# Patient Record
Sex: FEMALE | Race: WHITE | Employment: OTHER | URBAN - METROPOLITAN AREA
[De-identification: names, ages, dates, MRNs, and addresses within clinical notes are randomized per-mention and may not be internally consistent; named-entity substitution may affect disease eponyms.]

---

## 2019-01-09 ENCOUNTER — TELEPHONE (OUTPATIENT)
Dept: FAMILY MEDICINE CLINIC | Facility: CLINIC | Age: 79
End: 2019-01-09

## 2019-01-09 ENCOUNTER — OFFICE VISIT (OUTPATIENT)
Dept: FAMILY MEDICINE CLINIC | Facility: CLINIC | Age: 79
End: 2019-01-09
Payer: COMMERCIAL

## 2019-01-09 VITALS
DIASTOLIC BLOOD PRESSURE: 70 MMHG | SYSTOLIC BLOOD PRESSURE: 130 MMHG | HEIGHT: 60 IN | WEIGHT: 106 LBS | BODY MASS INDEX: 20.81 KG/M2

## 2019-01-09 DIAGNOSIS — E78.00 HYPERCHOLESTEREMIA: ICD-10-CM

## 2019-01-09 DIAGNOSIS — D22.9 CHANGING NEVUS: ICD-10-CM

## 2019-01-09 DIAGNOSIS — Z85.3 PERSONAL HISTORY OF BREAST CANCER: ICD-10-CM

## 2019-01-09 DIAGNOSIS — Z00.00 ENCOUNTER FOR MEDICARE ANNUAL WELLNESS EXAM: Primary | ICD-10-CM

## 2019-01-09 DIAGNOSIS — E11.9 CONTROLLED TYPE 2 DIABETES MELLITUS WITHOUT COMPLICATION, WITHOUT LONG-TERM CURRENT USE OF INSULIN (HCC): ICD-10-CM

## 2019-01-09 DIAGNOSIS — Z13.31 DEPRESSION SCREENING: ICD-10-CM

## 2019-01-09 DIAGNOSIS — Z90.11 STATUS POST RIGHT MASTECTOMY: ICD-10-CM

## 2019-01-09 DIAGNOSIS — I10 ESSENTIAL HYPERTENSION: ICD-10-CM

## 2019-01-09 PROCEDURE — 96160 PT-FOCUSED HLTH RISK ASSMT: CPT

## 2019-01-09 PROCEDURE — 99397 PER PM REEVAL EST PAT 65+ YR: CPT

## 2019-01-09 PROCEDURE — G0439 PPPS, SUBSEQ VISIT: HCPCS

## 2019-01-09 RX ORDER — ATORVASTATIN CALCIUM 10 MG/1
10 TABLET, FILM COATED ORAL NIGHTLY
COMMUNITY
End: 2019-01-11

## 2019-01-09 RX ORDER — ENALAPRIL MALEATE 5 MG/1
5 TABLET ORAL DAILY
COMMUNITY
End: 2019-01-09

## 2019-01-09 RX ORDER — ENALAPRIL MALEATE 5 MG/1
5 TABLET ORAL DAILY
Qty: 90 TABLET | Refills: 0 | Status: SHIPPED | OUTPATIENT
Start: 2019-01-09 | End: 2019-03-01

## 2019-01-10 ENCOUNTER — NURSE ONLY (OUTPATIENT)
Dept: FAMILY MEDICINE CLINIC | Facility: CLINIC | Age: 79
End: 2019-01-10
Payer: COMMERCIAL

## 2019-01-10 PROCEDURE — 36415 COLL VENOUS BLD VENIPUNCTURE: CPT

## 2019-01-10 NOTE — PATIENT INSTRUCTIONS
Ric Ghotra's SCREENING SCHEDULE   Tests on this list are recommended by your physician but may not be covered, or covered at this frequency, by your insurer. Please check with your insurance carrier before scheduling to verify coverage.    Tami Sahu Screen  Covered every 5 years No results found for this or any previous visit. No flowsheet data found. Fecal Occult Blood   Covered Annually No results found for: FOB, OCCULTSTOOL No flowsheet data found.      Barium Enema-   uncomfortable but covered birthday    Hepatitis B for Moderate/High Risk       No orders found for this or any previous visit.  Medium/high risk factors:   End-stage renal disease   Hemophiliacs who received Factor VIII or IX concentrates   Clients of institutions for the mentally r

## 2019-01-10 NOTE — PROGRESS NOTES
HPI:   Saqib Sanchez is a 67year old female who presents for a MA (Medicare Advantage) [facility] (Once per calendar year). Pt c/o raised dark colored mole on her R lower leg that has been growing in size horizontally over the past year.  She denies had a score of 0 so is at low risk.     Patient Care Team: Patient Care Team:  Alvarez No MD as PCP - General (Family Medicine)    Patient Active Problem List:     Encounter for Medicare annual wellness exam     Essential hypertension     Hypercholest dysuria, vaginal discharge or itching, no complaint of urinary incontinence   MUSCULOSKELETAL: denies back pain  NEURO: denies headaches  PSYCHE: denies depression or anxiety  HEMATOLOGIC: denies hx of anemia  ENDOCRINE: denies thyroid history  ALL/ASTHMA: turgor normal, no rashes, large raised round dark colored lesion on mid lateral aspect of R LE   Lymph nodes: Cervical, supraclavicular, and axillary nodes normal   Neurologic: Normal       Vaccination History     Immunization History   Administered Date(s good     PLAN:  The patient indicates understanding of these issues and agrees to the plan. Reinforced healthy diet, lifestyle, and exercise.     Return in about 3 months (around 4/9/2019) for f/u and further treatment of HTN and DM and in 1 year for Medic 3 yrs age 21-68 or Pap+HPV every 5 yrs age 33-67, age 72 and older at high risk There are no preventive care reminders to display for this patient.  Update Health Maintenance if applicable    Chlamydia  Annually if high risk No results found for: CHLAMYDIA found.       Diabetes      HgbA1C  Annually No results found for: A1C    No flowsheet data found. Creat/alb ratio  Annually No results found for: MICROALBCREA, MALBCRECALC     LDL  Annually No results found for: LDL, LDLC No flowsheet data found.      Brinda Early

## 2019-01-11 DIAGNOSIS — E78.00 HYPERCHOLESTEREMIA: ICD-10-CM

## 2019-01-11 LAB
ALBUMIN/GLOBULIN RATIO: 1.7 (CALC) (ref 1–2.5)
ALBUMIN: 4.5 G/DL (ref 3.6–5.1)
ALKALINE PHOSPHATASE: 89 U/L (ref 33–130)
ALT: 17 U/L (ref 6–29)
APPEARANCE: CLEAR
AST: 22 U/L (ref 10–35)
BILIRUBIN, TOTAL: 0.6 MG/DL (ref 0.2–1.2)
BILIRUBIN: NEGATIVE
BUN/CREATININE RATIO: 17 (CALC) (ref 6–22)
BUN: 10 MG/DL (ref 7–25)
CALCIUM: 10 MG/DL (ref 8.6–10.4)
CARBON DIOXIDE: 26 MMOL/L (ref 20–32)
CHLORIDE: 101 MMOL/L (ref 98–110)
CHOL/HDLC RATIO: 2.9 (CALC)
CHOLESTEROL, TOTAL: 141 MG/DL
COLOR: YELLOW
CREATININE, RANDOM URINE: 29 MG/DL (ref 20–275)
CREATININE: 0.58 MG/DL (ref 0.6–0.93)
EGFR IF AFRICN AM: 102 ML/MIN/1.73M2
EGFR IF NONAFRICN AM: 88 ML/MIN/1.73M2
GLOBULIN: 2.6 G/DL (CALC) (ref 1.9–3.7)
GLUCOSE: 126 MG/DL (ref 65–99)
GLUCOSE: NEGATIVE
HDL CHOLESTEROL: 49 MG/DL
HEMATOCRIT: 42.5 % (ref 35–45)
HEMOGLOBIN A1C: 6.6 % OF TOTAL HGB
HEMOGLOBIN: 14.1 G/DL (ref 11.7–15.5)
KETONES: NEGATIVE
LDL-CHOLESTEROL: 75 MG/DL (CALC)
MCH: 29.3 PG (ref 27–33)
MCHC: 33.2 G/DL (ref 32–36)
MCV: 88.4 FL (ref 80–100)
MICROALBUMIN/CREATININE RATIO, RANDOM URINE: 7 MCG/MG CREAT
MICROALBUMIN: 0.2 MG/DL
MPV: 13.2 FL (ref 7.5–12.5)
NITRITE: NEGATIVE
NON-HDL CHOLESTEROL: 92 MG/DL (CALC)
OCCULT BLOOD: NEGATIVE
PH: 7.5 (ref 5–8)
PLATELET COUNT: 207 THOUSAND/UL (ref 140–400)
POTASSIUM: 4.9 MMOL/L (ref 3.5–5.3)
PROTEIN, TOTAL: 7.1 G/DL (ref 6.1–8.1)
PROTEIN: NEGATIVE
RDW: 12.2 % (ref 11–15)
RED BLOOD CELL COUNT: 4.81 MILLION/UL (ref 3.8–5.1)
SODIUM: 136 MMOL/L (ref 135–146)
SPECIFIC GRAVITY: 1.01 (ref 1–1.03)
TRIGLYCERIDES: 87 MG/DL
WHITE BLOOD CELL COUNT: 6.8 THOUSAND/UL (ref 3.8–10.8)

## 2019-01-11 RX ORDER — ATORVASTATIN CALCIUM 10 MG/1
10 TABLET, FILM COATED ORAL NIGHTLY
Qty: 90 TABLET | Refills: 3 | Status: SHIPPED | OUTPATIENT
Start: 2019-01-11 | End: 2019-01-11

## 2019-01-25 PROBLEM — Z00.00 ENCOUNTER FOR MEDICARE ANNUAL WELLNESS EXAM: Status: RESOLVED | Noted: 2019-01-09 | Resolved: 2019-01-25

## 2019-01-25 PROBLEM — H35.372 EPIRETINAL MEMBRANE (ERM) OF LEFT EYE: Status: ACTIVE | Noted: 2019-01-25

## 2019-01-25 PROBLEM — E11.3293 CONTROLLED TYPE 2 DIABETES MELLITUS WITH BOTH EYES AFFECTED BY MILD NONPROLIFERATIVE RETINOPATHY WITHOUT MACULAR EDEMA, WITHOUT LONG-TERM CURRENT USE OF INSULIN (HCC): Status: ACTIVE | Noted: 2019-01-09

## 2019-01-25 PROBLEM — Z96.1 PSEUDOPHAKIA OF BOTH EYES: Status: ACTIVE | Noted: 2019-01-25

## 2019-01-25 PROBLEM — H40.003 GLAUCOMA SUSPECT OF BOTH EYES: Status: ACTIVE | Noted: 2019-01-25

## 2019-01-25 PROBLEM — Z13.31 DEPRESSION SCREENING: Status: RESOLVED | Noted: 2019-01-09 | Resolved: 2019-01-25

## 2019-02-21 ENCOUNTER — TELEPHONE (OUTPATIENT)
Dept: FAMILY MEDICINE CLINIC | Facility: CLINIC | Age: 79
End: 2019-02-21

## 2019-03-01 DIAGNOSIS — I10 ESSENTIAL HYPERTENSION: ICD-10-CM

## 2019-03-01 RX ORDER — ENALAPRIL MALEATE 5 MG/1
TABLET ORAL
Qty: 90 TABLET | Refills: 0 | Status: SHIPPED | OUTPATIENT
Start: 2019-03-01 | End: 2019-03-01

## 2019-05-18 ENCOUNTER — NURSE ONLY (OUTPATIENT)
Dept: FAMILY MEDICINE CLINIC | Facility: CLINIC | Age: 79
End: 2019-05-18
Payer: COMMERCIAL

## 2019-05-18 DIAGNOSIS — E11.9 CONTROLLED TYPE 2 DIABETES MELLITUS WITHOUT COMPLICATION, WITHOUT LONG-TERM CURRENT USE OF INSULIN (HCC): Primary | ICD-10-CM

## 2019-05-18 PROCEDURE — 36415 COLL VENOUS BLD VENIPUNCTURE: CPT

## 2019-05-18 NOTE — PROGRESS NOTES
Patient came in today to get labs done, Left arm was used w/o complications and specimen sent to [facility].

## 2019-05-22 ENCOUNTER — TELEPHONE (OUTPATIENT)
Dept: FAMILY MEDICINE CLINIC | Facility: CLINIC | Age: 79
End: 2019-05-22

## 2019-05-22 ENCOUNTER — OFFICE VISIT (OUTPATIENT)
Dept: FAMILY MEDICINE CLINIC | Facility: CLINIC | Age: 79
End: 2019-05-22
Payer: COMMERCIAL

## 2019-05-22 VITALS
DIASTOLIC BLOOD PRESSURE: 60 MMHG | HEART RATE: 88 BPM | BODY MASS INDEX: 21 KG/M2 | WEIGHT: 108.63 LBS | SYSTOLIC BLOOD PRESSURE: 134 MMHG | OXYGEN SATURATION: 97 %

## 2019-05-22 DIAGNOSIS — Z90.11 STATUS POST RIGHT MASTECTOMY: ICD-10-CM

## 2019-05-22 DIAGNOSIS — I10 ESSENTIAL HYPERTENSION: ICD-10-CM

## 2019-05-22 DIAGNOSIS — E78.00 HYPERCHOLESTEREMIA: ICD-10-CM

## 2019-05-22 DIAGNOSIS — Z85.3 PERSONAL HISTORY OF BREAST CANCER: ICD-10-CM

## 2019-05-22 DIAGNOSIS — E11.3293 CONTROLLED TYPE 2 DIABETES MELLITUS WITH BOTH EYES AFFECTED BY MILD NONPROLIFERATIVE RETINOPATHY WITHOUT MACULAR EDEMA, WITHOUT LONG-TERM CURRENT USE OF INSULIN (HCC): Primary | ICD-10-CM

## 2019-05-22 PROCEDURE — 99213 OFFICE O/P EST LOW 20 MIN: CPT

## 2019-05-22 RX ORDER — ENALAPRIL MALEATE 5 MG/1
2.5 TABLET ORAL DAILY
Qty: 90 TABLET | Refills: 0 | COMMUNITY
Start: 2019-03-01 | End: 2019-07-09

## 2019-05-22 RX ORDER — ATORVASTATIN CALCIUM 10 MG/1
5 TABLET, FILM COATED ORAL NIGHTLY
Qty: 90 TABLET | Refills: 3 | COMMUNITY
Start: 2019-01-11 | End: 2020-02-24

## 2019-05-23 NOTE — PROGRESS NOTES
HPI:    Patient ID: Munir Gentleman is a 67year old female. Diabetes   She presents for her follow-up diabetic visit. She has type 2 diabetes mellitus. Onset time: few years ago. Her disease course has been stable.  There are no hypoglycemic associate Disp: 90 tablet Rfl: 0   atorvastatin 10 MG Oral Tab Take 0.5 tablets (5 mg total) by mouth nightly. Disp: 90 tablet Rfl: 3     Allergies:No Known Allergies   PHYSICAL EXAM:   Physical Exam   Constitutional: She is oriented to person, place, and time.  She

## 2019-05-23 NOTE — PATIENT INSTRUCTIONS
Diabetes (Información general)  La diabetes es un problema de mark a moses plazo que significa que zarate cuerpo no produce la suficiente insulina. O también puede significar que zarate cuerpo no puede utilizar la [employer] produce.  La insulina es césar hormon · Siga la dieta que le recomiende zarate proveedor de atención médica.  Use la insulina o cualquier otro medicamento para la diabetes, exactamente geovany se lo indiquen. · Vigile ashlyn niveles de azúcar en la [employer] indiquen.  Lleve un registro con los res · Siga tomando ashlyn pastillas para la diabetes (medicamentos orales) incluso si ha estado vomitando y se siente muy enfermo.  Llame a zarate proveedor de inmediato porque es posible que necesite insulina para bajar los niveles de azúcar en la sera hasta que se Revise zarate nivel de azúcar en la sera 15 minutos después de tratarse. Si sigue por debajo de 70, tome de 15 a 20 gramos más de azúcar de acción rápida. Vuelva a revisarse en 15 minutos.  Si regresa a lo normal (79 o más), coma un bocadillo o césar comida par

## 2019-05-30 DIAGNOSIS — I10 ESSENTIAL HYPERTENSION: ICD-10-CM

## 2019-05-31 RX ORDER — ENALAPRIL MALEATE 5 MG/1
TABLET ORAL
Qty: 90 TABLET | Refills: 0 | OUTPATIENT
Start: 2019-05-31

## 2019-06-03 ENCOUNTER — TELEPHONE (OUTPATIENT)
Dept: FAMILY MEDICINE CLINIC | Facility: CLINIC | Age: 79
End: 2019-06-03

## 2019-06-10 PROBLEM — K58.8 OTHER IRRITABLE BOWEL SYNDROME: Status: ACTIVE | Noted: 2017-11-28

## 2019-06-10 PROBLEM — Q43.8 TORTUOUS COLON: Status: ACTIVE | Noted: 2017-11-28

## 2019-06-10 PROBLEM — K64.8 INTERNAL HEMORRHOIDS: Status: ACTIVE | Noted: 2017-11-28

## 2019-06-10 PROBLEM — K57.30 DIVERTICULOSIS OF COLON: Status: ACTIVE | Noted: 2017-11-28

## 2019-07-09 DIAGNOSIS — I10 ESSENTIAL HYPERTENSION: ICD-10-CM

## 2019-07-11 RX ORDER — ENALAPRIL MALEATE 5 MG/1
TABLET ORAL
Qty: 90 TABLET | Refills: 0 | Status: SHIPPED | OUTPATIENT
Start: 2019-07-11 | End: 2019-12-06

## 2019-08-16 ENCOUNTER — NURSE ONLY (OUTPATIENT)
Dept: FAMILY MEDICINE CLINIC | Facility: CLINIC | Age: 79
End: 2019-08-16
Payer: COMMERCIAL

## 2019-08-16 DIAGNOSIS — I10 ESSENTIAL HYPERTENSION: ICD-10-CM

## 2019-08-16 DIAGNOSIS — E11.3293 CONTROLLED TYPE 2 DIABETES MELLITUS WITH BOTH EYES AFFECTED BY MILD NONPROLIFERATIVE RETINOPATHY WITHOUT MACULAR EDEMA, WITHOUT LONG-TERM CURRENT USE OF INSULIN (HCC): ICD-10-CM

## 2019-08-16 PROCEDURE — 36415 COLL VENOUS BLD VENIPUNCTURE: CPT

## 2019-08-16 NOTE — PROGRESS NOTES
Patient came in today to get labs done, R arm was used w/o complications and specimen sent to [facility].

## 2019-08-17 LAB
ALBUMIN/GLOBULIN RATIO: 1.6 (CALC) (ref 1–2.5)
ALBUMIN: 4.5 G/DL (ref 3.6–5.1)
ALKALINE PHOSPHATASE: 101 U/L (ref 33–130)
ALT: 15 U/L (ref 6–29)
AST: 21 U/L (ref 10–35)
BILIRUBIN, TOTAL: 0.6 MG/DL (ref 0.2–1.2)
BUN/CREATININE RATIO: 15 (CALC) (ref 6–22)
BUN: 9 MG/DL (ref 7–25)
CALCIUM: 10.1 MG/DL (ref 8.6–10.4)
CARBON DIOXIDE: 24 MMOL/L (ref 20–32)
CHLORIDE: 102 MMOL/L (ref 98–110)
CREATININE: 0.59 MG/DL (ref 0.6–0.93)
EGFR IF AFRICN AM: 102 ML/MIN/1.73M2
EGFR IF NONAFRICN AM: 88 ML/MIN/1.73M2
GLOBULIN: 2.8 G/DL (CALC) (ref 1.9–3.7)
GLUCOSE: 122 MG/DL (ref 65–99)
HEMOGLOBIN A1C: 6.6 % OF TOTAL HGB
POTASSIUM: 5 MMOL/L (ref 3.5–5.3)
PROTEIN, TOTAL: 7.3 G/DL (ref 6.1–8.1)
SODIUM: 136 MMOL/L (ref 135–146)

## 2019-08-21 ENCOUNTER — OFFICE VISIT (OUTPATIENT)
Dept: FAMILY MEDICINE CLINIC | Facility: CLINIC | Age: 79
End: 2019-08-21
Payer: COMMERCIAL

## 2019-08-21 VITALS
OXYGEN SATURATION: 97 % | HEART RATE: 94 BPM | HEIGHT: 57 IN | BODY MASS INDEX: 22.65 KG/M2 | SYSTOLIC BLOOD PRESSURE: 130 MMHG | WEIGHT: 105 LBS | DIASTOLIC BLOOD PRESSURE: 60 MMHG

## 2019-08-21 DIAGNOSIS — I10 ESSENTIAL HYPERTENSION: ICD-10-CM

## 2019-08-21 DIAGNOSIS — Z01.89 ENCOUNTER FOR ROUTINE LABORATORY TESTING: ICD-10-CM

## 2019-08-21 DIAGNOSIS — E11.3293 CONTROLLED TYPE 2 DIABETES MELLITUS WITH BOTH EYES AFFECTED BY MILD NONPROLIFERATIVE RETINOPATHY WITHOUT MACULAR EDEMA, WITHOUT LONG-TERM CURRENT USE OF INSULIN (HCC): Primary | ICD-10-CM

## 2019-08-21 DIAGNOSIS — E78.00 HYPERCHOLESTEREMIA: ICD-10-CM

## 2019-08-21 PROCEDURE — 99214 OFFICE O/P EST MOD 30 MIN: CPT

## 2019-08-21 RX ORDER — ENALAPRIL MALEATE 5 MG/1
5 TABLET ORAL
Qty: 90 TABLET | Refills: 0 | Status: CANCELLED | OUTPATIENT
Start: 2019-08-21

## 2019-08-23 NOTE — PROGRESS NOTES
HPI:    Patient ID: Fani Wadr is a 67year old female. Diabetes   She presents for her follow-up diabetic visit. She has type 2 diabetes mellitus. Onset time: few years ago. Her disease course has been improving.  There are no hypoglycemic associ Negative for photophobia, discharge and itching. Respiratory: Negative for cough and shortness of breath. Cardiovascular: Negative for chest pain, palpitations, orthopnea and PND.    Gastrointestinal: Negative for abdominal pain, constipation, diarrhea advised to decrease amount of salt intake in her diet. BP currently controlled. Continue Enalapril as previously instructed. Will continue BP monitoring on next scheduled appointment. 3. Hypercholesteremia  - COMP METABOLIC PANEL (14);  Future  - LIPI

## 2019-08-29 DIAGNOSIS — I10 ESSENTIAL HYPERTENSION: ICD-10-CM

## 2019-09-04 RX ORDER — ENALAPRIL MALEATE 5 MG/1
TABLET ORAL
Qty: 90 TABLET | Refills: 0 | OUTPATIENT
Start: 2019-09-04

## 2019-11-04 ENCOUNTER — OFFICE VISIT (OUTPATIENT)
Dept: DERMATOLOGY CLINIC | Facility: CLINIC | Age: 79
End: 2019-11-04
Payer: COMMERCIAL

## 2019-11-04 DIAGNOSIS — L81.4 SOLAR LENTIGO: ICD-10-CM

## 2019-11-04 DIAGNOSIS — D48.5 NEOPLASM OF UNCERTAIN BEHAVIOR OF SKIN: Primary | ICD-10-CM

## 2019-11-04 PROCEDURE — 11102 TANGNTL BX SKIN SINGLE LES: CPT | Performed by: DERMATOLOGY

## 2019-11-04 PROCEDURE — 99201 OFFICE/OUTPT VISIT,NEW,LEVL I: CPT | Performed by: DERMATOLOGY

## 2019-11-04 NOTE — PROGRESS NOTES
HPI:     Chief Complaint     Moles        HPI     Moles      Additional comments: New Patient ppresent with large raised mole .  Patient c/o having mole for 4 years and has changed size and color          Last edited by Flower Snow, [address] [place] [address] Carla on 11/4/2019  9: file        Inability: Not on file      Transportation needs:        Medical: Not on file        Non-medical: Not on file    Tobacco Use      Smoking status: Never Smoker      Smokeless tobacco: Never Used    Substance and Sexual Activity      Alcohol use: patches that look a little bit pearly  2.   Some blotchy brown macules suggestive of past sun damage on face      ASSESSMENT/PLAN:   Neoplasm of uncertain behavior of skin  (primary encounter diagnosis)-rule out inflamed seborrheic keratosis versus pigmente

## 2019-11-08 DIAGNOSIS — C44.712 BASAL CELL CARCINOMA (BCC) OF RIGHT LOWER EXTREMITY: Primary | ICD-10-CM

## 2019-11-08 NOTE — PROGRESS NOTES
Logged in path book and added to pmh. Pt informed via language line of results and reccs.  Voiced understanding

## 2019-11-11 ENCOUNTER — TELEPHONE (OUTPATIENT)
Dept: DERMATOLOGY CLINIC | Facility: CLINIC | Age: 79
End: 2019-11-11

## 2019-11-12 NOTE — TELEPHONE ENCOUNTER
Called patient back via language line and they \"already got a call back and everything was taken care of. \" No further questions.  Closed

## 2019-11-21 ENCOUNTER — OFFICE VISIT (OUTPATIENT)
Dept: SURGERY | Facility: CLINIC | Age: 79
End: 2019-11-21
Payer: COMMERCIAL

## 2019-11-21 ENCOUNTER — TELEPHONE (OUTPATIENT)
Dept: SURGERY | Facility: CLINIC | Age: 79
End: 2019-11-21

## 2019-11-21 DIAGNOSIS — C44.712 BASAL CELL CARCINOMA OF SKIN OF RIGHT LOWER LIMB, INCLUDING HIP: Primary | ICD-10-CM

## 2019-11-21 PROCEDURE — 99203 OFFICE O/P NEW LOW 30 MIN: CPT | Performed by: PLASTIC SURGERY

## 2019-11-21 PROCEDURE — G0463 HOSPITAL OUTPT CLINIC VISIT: HCPCS | Performed by: PLASTIC SURGERY

## 2019-11-21 RX ORDER — HYDROCODONE BITARTRATE AND ACETAMINOPHEN 5; 325 MG/1; MG/1
TABLET ORAL
Qty: 10 TABLET | Refills: 0 | Status: SHIPPED | OUTPATIENT
Start: 2019-11-21 | End: 2020-02-24 | Stop reason: ALTCHOICE

## 2019-11-21 NOTE — H&P (VIEW-ONLY)
Wallace Mathur is a 67year old female that presents with Patient presents with:  Lesion: R lateral lower leg,BCC  .     REFERRED BY:  Abigail Hayes    Pacemaker: No  Latex Allergy: no  Coumadin: No  Plavix: No  Other anticoagulants: No  Cardiac stents: breast CA     Social History    Socioeconomic History      Marital status:       Spouse name: Not on file      Number of children: Not on file      Years of education: Not on file      Highest education level: Not on file    Occupational History Known Problems Daughter    • Alcohol and Other Disorders Associated Son        PHYSICAL EXAM:   CONSTITUTIONAL: Overall appearance - Normal  HEENT: Normocephalic  EYES: Conjunctiva - Right: Normal, Left: Normal; EOMI  EARS: Inspection - Right: Normal, Left

## 2019-11-21 NOTE — PROGRESS NOTES
Pt request for surgery signed by pt and witnessed and signed by RN. Prescription for Norco 5 mg po and narcotic prescription electronically sent to pharmacy per Dr. Kong Dear order and pt instructed to  prescription before surgery.    Pre-Surgica

## 2019-11-21 NOTE — H&P
Jorge Luis Banda is a 67year old female that presents with Patient presents with:  Lesion: R lateral lower leg,BCC  .     REFERRED BY:  Jett Jung    Pacemaker: No  Latex Allergy: no  Coumadin: No  Plavix: No  Other anticoagulants: No  Cardiac stents: breast CA     Social History    Socioeconomic History      Marital status:       Spouse name: Not on file      Number of children: Not on file      Years of education: Not on file      Highest education level: Not on file    Occupational History Known Problems Daughter    • Alcohol and Other Disorders Associated Son        PHYSICAL EXAM:   CONSTITUTIONAL: Overall appearance - Normal  HEENT: Normocephalic  EYES: Conjunctiva - Right: Normal, Left: Normal; EOMI  EARS: Inspection - Right: Normal, Left

## 2019-11-21 NOTE — TELEPHONE ENCOUNTER
Surgery is scheduled for 11/22/19 TOMORROW  Insurance is [facility] medicare HMO  Thank you!

## 2019-11-22 ENCOUNTER — HOSPITAL DOCUMENTATION (OUTPATIENT)
Dept: SURGERY | Facility: CLINIC | Age: 79
End: 2019-11-22

## 2019-11-22 ENCOUNTER — HOSPITAL ENCOUNTER (OUTPATIENT)
Facility: HOSPITAL | Age: 79
Setting detail: HOSPITAL OUTPATIENT SURGERY
Discharge: HOME | End: 2019-11-22
Attending: PLASTIC SURGERY | Admitting: PLASTIC SURGERY
Payer: MEDICARE

## 2019-11-22 ENCOUNTER — ANESTHESIA EVENT (OUTPATIENT)
Dept: SURGERY | Facility: HOSPITAL | Age: 79
End: 2019-11-22
Payer: MEDICARE

## 2019-11-22 ENCOUNTER — ANESTHESIA (OUTPATIENT)
Dept: SURGERY | Facility: HOSPITAL | Age: 79
End: 2019-11-22
Payer: MEDICARE

## 2019-11-22 VITALS
RESPIRATION RATE: 16 BRPM | HEART RATE: 59 BPM | SYSTOLIC BLOOD PRESSURE: 133 MMHG | TEMPERATURE: 97 F | HEIGHT: 60 IN | OXYGEN SATURATION: 98 % | BODY MASS INDEX: 19.83 KG/M2 | DIASTOLIC BLOOD PRESSURE: 55 MMHG | WEIGHT: 101 LBS

## 2019-11-22 DIAGNOSIS — C44.712 BASAL CELL CARCINOMA (BCC) OF RIGHT LOWER EXTREMITY: Primary | ICD-10-CM

## 2019-11-22 DIAGNOSIS — C44.712 BASAL CELL CARCINOMA OF SKIN OF RIGHT LOWER LIMB, INCLUDING HIP: Primary | ICD-10-CM

## 2019-11-22 PROCEDURE — 14301 TIS TRNFR ANY 30.1-60 SQ CM: CPT | Performed by: PLASTIC SURGERY

## 2019-11-22 PROCEDURE — 0HBKXZZ EXCISION OF RIGHT LOWER LEG SKIN, EXTERNAL APPROACH: ICD-10-PCS | Performed by: PLASTIC SURGERY

## 2019-11-22 RX ORDER — KETOROLAC TROMETHAMINE 15 MG/ML
INJECTION, SOLUTION INTRAMUSCULAR; INTRAVENOUS AS NEEDED
Status: DISCONTINUED | OUTPATIENT
Start: 2019-11-22 | End: 2019-11-22 | Stop reason: SURG

## 2019-11-22 RX ORDER — HYDROCODONE BITARTRATE AND ACETAMINOPHEN 5; 325 MG/1; MG/1
1 TABLET ORAL EVERY 4 HOURS PRN
Status: DISCONTINUED | OUTPATIENT
Start: 2019-11-22 | End: 2019-11-22

## 2019-11-22 RX ORDER — LIDOCAINE HYDROCHLORIDE 10 MG/ML
INJECTION, SOLUTION EPIDURAL; INFILTRATION; INTRACAUDAL; PERINEURAL AS NEEDED
Status: DISCONTINUED | OUTPATIENT
Start: 2019-11-22 | End: 2019-11-22 | Stop reason: SURG

## 2019-11-22 RX ORDER — HYDROMORPHONE HYDROCHLORIDE 1 MG/ML
0.2 INJECTION, SOLUTION INTRAMUSCULAR; INTRAVENOUS; SUBCUTANEOUS EVERY 5 MIN PRN
Status: DISCONTINUED | OUTPATIENT
Start: 2019-11-22 | End: 2019-11-22

## 2019-11-22 RX ORDER — FAMOTIDINE 20 MG/1
20 TABLET ORAL ONCE
Status: DISCONTINUED | OUTPATIENT
Start: 2019-11-22 | End: 2019-11-22 | Stop reason: HOSPADM

## 2019-11-22 RX ORDER — MIDAZOLAM HYDROCHLORIDE 1 MG/ML
INJECTION INTRAMUSCULAR; INTRAVENOUS AS NEEDED
Status: DISCONTINUED | OUTPATIENT
Start: 2019-11-22 | End: 2019-11-22 | Stop reason: SURG

## 2019-11-22 RX ORDER — SODIUM CHLORIDE, SODIUM LACTATE, POTASSIUM CHLORIDE, CALCIUM CHLORIDE 600; 310; 30; 20 MG/100ML; MG/100ML; MG/100ML; MG/100ML
INJECTION, SOLUTION INTRAVENOUS CONTINUOUS
Status: DISCONTINUED | OUTPATIENT
Start: 2019-11-22 | End: 2019-11-22

## 2019-11-22 RX ORDER — DEXTROSE MONOHYDRATE 25 G/50ML
50 INJECTION, SOLUTION INTRAVENOUS
Status: DISCONTINUED | OUTPATIENT
Start: 2019-11-22 | End: 2019-11-22

## 2019-11-22 RX ORDER — EPHEDRINE SULFATE 50 MG/ML
INJECTION, SOLUTION INTRAVENOUS AS NEEDED
Status: DISCONTINUED | OUTPATIENT
Start: 2019-11-22 | End: 2019-11-22 | Stop reason: SURG

## 2019-11-22 RX ORDER — PROCHLORPERAZINE EDISYLATE 5 MG/ML
5 INJECTION INTRAMUSCULAR; INTRAVENOUS ONCE AS NEEDED
Status: DISCONTINUED | OUTPATIENT
Start: 2019-11-22 | End: 2019-11-22

## 2019-11-22 RX ORDER — LIDOCAINE HYDROCHLORIDE AND EPINEPHRINE 5; 5 MG/ML; UG/ML
INJECTION, SOLUTION INFILTRATION; PERINEURAL AS NEEDED
Status: DISCONTINUED | OUTPATIENT
Start: 2019-11-22 | End: 2019-11-22 | Stop reason: HOSPADM

## 2019-11-22 RX ORDER — HYDROMORPHONE HYDROCHLORIDE 1 MG/ML
0.4 INJECTION, SOLUTION INTRAMUSCULAR; INTRAVENOUS; SUBCUTANEOUS EVERY 5 MIN PRN
Status: DISCONTINUED | OUTPATIENT
Start: 2019-11-22 | End: 2019-11-22

## 2019-11-22 RX ORDER — HYDROCODONE BITARTRATE AND ACETAMINOPHEN 5; 325 MG/1; MG/1
2 TABLET ORAL AS NEEDED
Status: DISCONTINUED | OUTPATIENT
Start: 2019-11-22 | End: 2019-11-22

## 2019-11-22 RX ORDER — ACETAMINOPHEN 500 MG
1000 TABLET ORAL ONCE
Status: DISCONTINUED | OUTPATIENT
Start: 2019-11-22 | End: 2019-11-22 | Stop reason: HOSPADM

## 2019-11-22 RX ORDER — HYDROCODONE BITARTRATE AND ACETAMINOPHEN 5; 325 MG/1; MG/1
1 TABLET ORAL AS NEEDED
Status: DISCONTINUED | OUTPATIENT
Start: 2019-11-22 | End: 2019-11-22

## 2019-11-22 RX ORDER — MORPHINE SULFATE 4 MG/ML
4 INJECTION, SOLUTION INTRAMUSCULAR; INTRAVENOUS EVERY 10 MIN PRN
Status: DISCONTINUED | OUTPATIENT
Start: 2019-11-22 | End: 2019-11-22

## 2019-11-22 RX ORDER — MORPHINE SULFATE 2 MG/ML
2 INJECTION, SOLUTION INTRAMUSCULAR; INTRAVENOUS EVERY 10 MIN PRN
Status: DISCONTINUED | OUTPATIENT
Start: 2019-11-22 | End: 2019-11-22

## 2019-11-22 RX ORDER — HYDROMORPHONE HYDROCHLORIDE 1 MG/ML
0.6 INJECTION, SOLUTION INTRAMUSCULAR; INTRAVENOUS; SUBCUTANEOUS EVERY 5 MIN PRN
Status: DISCONTINUED | OUTPATIENT
Start: 2019-11-22 | End: 2019-11-22

## 2019-11-22 RX ORDER — ACETAMINOPHEN 500 MG
1000 TABLET ORAL ONCE
Status: COMPLETED | OUTPATIENT
Start: 2019-11-22 | End: 2019-11-22

## 2019-11-22 RX ORDER — METOCLOPRAMIDE 10 MG/1
10 TABLET ORAL ONCE
Status: DISCONTINUED | OUTPATIENT
Start: 2019-11-22 | End: 2019-11-22 | Stop reason: HOSPADM

## 2019-11-22 RX ORDER — MORPHINE SULFATE 10 MG/ML
6 INJECTION, SOLUTION INTRAMUSCULAR; INTRAVENOUS EVERY 10 MIN PRN
Status: DISCONTINUED | OUTPATIENT
Start: 2019-11-22 | End: 2019-11-22

## 2019-11-22 RX ORDER — NALOXONE HYDROCHLORIDE 0.4 MG/ML
80 INJECTION, SOLUTION INTRAMUSCULAR; INTRAVENOUS; SUBCUTANEOUS AS NEEDED
Status: DISCONTINUED | OUTPATIENT
Start: 2019-11-22 | End: 2019-11-22

## 2019-11-22 RX ORDER — ONDANSETRON 2 MG/ML
4 INJECTION INTRAMUSCULAR; INTRAVENOUS ONCE AS NEEDED
Status: DISCONTINUED | OUTPATIENT
Start: 2019-11-22 | End: 2019-11-22

## 2019-11-22 RX ORDER — HALOPERIDOL 5 MG/ML
0.25 INJECTION INTRAMUSCULAR ONCE AS NEEDED
Status: DISCONTINUED | OUTPATIENT
Start: 2019-11-22 | End: 2019-11-22

## 2019-11-22 RX ADMIN — LIDOCAINE HYDROCHLORIDE 50 MG: 10 INJECTION, SOLUTION EPIDURAL; INFILTRATION; INTRACAUDAL; PERINEURAL at 12:46:00

## 2019-11-22 RX ADMIN — EPHEDRINE SULFATE 5 MG: 50 INJECTION, SOLUTION INTRAVENOUS at 13:20:00

## 2019-11-22 RX ADMIN — KETOROLAC TROMETHAMINE 15 MG: 15 INJECTION, SOLUTION INTRAMUSCULAR; INTRAVENOUS at 13:54:00

## 2019-11-22 RX ADMIN — EPHEDRINE SULFATE 5 MG: 50 INJECTION, SOLUTION INTRAVENOUS at 13:33:00

## 2019-11-22 RX ADMIN — SODIUM CHLORIDE, SODIUM LACTATE, POTASSIUM CHLORIDE, CALCIUM CHLORIDE: 600; 310; 30; 20 INJECTION, SOLUTION INTRAVENOUS at 14:00:00

## 2019-11-22 RX ADMIN — MIDAZOLAM HYDROCHLORIDE 1 MG: 1 INJECTION INTRAMUSCULAR; INTRAVENOUS at 12:55:00

## 2019-11-22 NOTE — BRIEF OP NOTE
Pre-Operative Diagnosis: basal cell carcinoma     Post-Operative Diagnosis: basal cell carcinoma      Procedure Performed:   Procedure(s):  wide excision lesion(s) right leg, frozen section, flap reconstruction    Surgeon(s) and Role:     * LEIGH Elaine

## 2019-11-22 NOTE — ANESTHESIA PREPROCEDURE EVALUATION
Anesthesia PreOp Note    HPI:     Munir Morrell is a 67year old female who presents for preoperative consultation requested by: Moises Leal MD    Date of Surgery: 11/22/2019    Procedure(s):  EXCISION LESION LOWER EXTREMITY  Indication: bas controlled   • Hearing impairment     hard of hearing   • High blood pressure    • High cholesterol    • Hyperlipidemia    • Visual impairment     glasses       Past Surgical History:   Procedure Laterality Date   • BIOPSY OF SKIN LESION      benign result Sexual activity: Not on file    Lifestyle      Physical activity:        Days per week: Not on file        Minutes per session: Not on file      Stress: Not on file    Relationships      Social connections:        Talks on phone: Not on file        Gets to Classification: I    Neuro/Psych - negative ROS     GI/Hepatic/Renal - negative ROS     Endo/Other - negative ROS   Abdominal                Anesthesia Plan:   ASA:  2  Plan:   MAC  Post-op Pain Management: IV analgesics      I have informed Jone Sams

## 2019-11-22 NOTE — INTERVAL H&P NOTE
Pre-op Diagnosis: basal cell carcinoma    The above referenced H&P was reviewed by Joe Alcazar MD on 11/22/2019, the patient was examined and no significant changes have occurred in the patient's condition since the H&P was performed.   I discuss

## 2019-11-22 NOTE — ANESTHESIA POSTPROCEDURE EVALUATION
Patient: Munir Morrell    Procedure Summary     Date:  11/22/19 Room / Location:  [facility] MAIN OR 01 / [facility] MAIN OR    Anesthesia Start:  0613 Anesthesia Stop:  8938    Procedure:  EXCISION LESION LOWER EXTREMITY (Right ) Diagnosis:  (basal cell carcinoma)

## 2019-11-23 ENCOUNTER — TELEPHONE (OUTPATIENT)
Dept: SURGERY | Facility: CLINIC | Age: 79
End: 2019-11-23

## 2019-11-23 NOTE — TELEPHONE ENCOUNTER
LVM for pt to please call the office with any questions and/or concerns. Reminded next appointment 12/3/19 with RN  Dr Alissa Jones notified.

## 2019-11-25 NOTE — OPERATIVE REPORT
[facility]    PATIENT'S NAME: Bela Smallwood   ATTENDING PHYSICIAN: Ozzy Blood MD   OPERATING PHYSICIAN: Ozzy Blood MD   PATIENT ACCOUNT#:   [id number]    LOCATION:  [place] [address]  MEDICAL RECORD #:   [id number] Stefania Sung MD  d: 11/22/2019 13:56:31  t: 11/23/2019 00:02:10  [facility] [id number]  [facility]/    cc: Binh Mcallister. MD Cj Koch MD   [address] [place] [address]  Cathy Vann MD

## 2019-12-03 ENCOUNTER — NURSE ONLY (OUTPATIENT)
Dept: SURGERY | Facility: CLINIC | Age: 79
End: 2019-12-03
Payer: COMMERCIAL

## 2019-12-03 DIAGNOSIS — Z48.02 VISIT FOR SUTURE REMOVAL: Primary | ICD-10-CM

## 2019-12-03 DIAGNOSIS — C44.712 BASAL CELL CARCINOMA OF SKIN OF RIGHT LOWER LIMB, INCLUDING HIP: ICD-10-CM

## 2019-12-03 NOTE — PROGRESS NOTES
Surgery 1: BCC R leg  - Date: 19  - Days Since: 11  Pt is in the office today for suture removal R leg. Pt identified by name and . Orders reviewed. Denies pain and need for analgesics. Ambulating without assistance without difficulty.   R leg

## 2019-12-06 ENCOUNTER — TELEPHONE (OUTPATIENT)
Dept: FAMILY MEDICINE CLINIC | Facility: CLINIC | Age: 79
End: 2019-12-06

## 2019-12-06 ENCOUNTER — TELEPHONE (OUTPATIENT)
Dept: SURGERY | Facility: CLINIC | Age: 79
End: 2019-12-06

## 2019-12-06 DIAGNOSIS — I10 ESSENTIAL HYPERTENSION: ICD-10-CM

## 2019-12-06 RX ORDER — ENALAPRIL MALEATE 5 MG/1
5 TABLET ORAL
Qty: 90 TABLET | Refills: 0 | Status: SHIPPED | OUTPATIENT
Start: 2019-12-06 | End: 2020-02-24

## 2019-12-06 NOTE — TELEPHONE ENCOUNTER
Spoke to pt. Pt stated if she has to wash her leg, then apply Eucerin lotion to incisional site and if she has to cover her leg in the shower.    Pt instructed to wash her leg initally and apply Eucerin massages TID for 1 minute and she does not need to c

## 2020-01-30 ENCOUNTER — TELEPHONE (OUTPATIENT)
Dept: FAMILY MEDICINE CLINIC | Facility: CLINIC | Age: 80
End: 2020-01-30

## 2020-01-30 DIAGNOSIS — E11.3293 CONTROLLED TYPE 2 DIABETES MELLITUS WITH BOTH EYES AFFECTED BY MILD NONPROLIFERATIVE RETINOPATHY WITHOUT MACULAR EDEMA, WITHOUT LONG-TERM CURRENT USE OF INSULIN (HCC): Primary | ICD-10-CM

## 2020-01-30 NOTE — TELEPHONE ENCOUNTER
Referral was requested for patient to see Dr Maris Peña. Patient has an appointment tomorrow and is aware she may not get her referral approved so soon.

## 2020-02-12 ENCOUNTER — TELEPHONE (OUTPATIENT)
Dept: FAMILY MEDICINE CLINIC | Facility: CLINIC | Age: 80
End: 2020-02-12

## 2020-02-12 DIAGNOSIS — D22.9 CHANGING NEVUS: Primary | ICD-10-CM

## 2020-02-12 NOTE — TELEPHONE ENCOUNTER
Referral entered patient notified, she is aware we have to wait on insurance approval prior to her seeing specialist

## 2020-02-12 NOTE — TELEPHONE ENCOUNTER
Patient is requesting additional visits to follow up with dermatologist Dr. Marianela Galindo. Randy Felty. Please advise.

## 2020-02-20 ENCOUNTER — NURSE ONLY (OUTPATIENT)
Dept: FAMILY MEDICINE CLINIC | Facility: CLINIC | Age: 80
End: 2020-02-20
Payer: COMMERCIAL

## 2020-02-20 PROCEDURE — 36415 COLL VENOUS BLD VENIPUNCTURE: CPT | Performed by: FAMILY MEDICINE

## 2020-02-20 NOTE — PROGRESS NOTES
MICRO,A1C,UA,LIPID,CMP,and CBC labs drawn per Dr Severiano Artist orders, Patient tolerated lab draw well

## 2020-02-21 LAB
ALBUMIN/GLOBULIN RATIO: 1.8 (CALC) (ref 1–2.5)
ALBUMIN: 4.6 G/DL (ref 3.6–5.1)
ALKALINE PHOSPHATASE: 92 U/L (ref 37–153)
ALT: 15 U/L (ref 6–29)
APPEARANCE: CLEAR
AST: 21 U/L (ref 10–35)
BILIRUBIN, TOTAL: 0.7 MG/DL (ref 0.2–1.2)
BILIRUBIN: NEGATIVE
BUN/CREATININE RATIO: 12 (CALC) (ref 6–22)
BUN: 7 MG/DL (ref 7–25)
CALCIUM: 9.8 MG/DL (ref 8.6–10.4)
CARBON DIOXIDE: 24 MMOL/L (ref 20–32)
CHLORIDE: 101 MMOL/L (ref 98–110)
CHOL/HDLC RATIO: 2.8 (CALC)
CHOLESTEROL, TOTAL: 145 MG/DL
COLOR: YELLOW
CREATININE, RANDOM URINE: 33 MG/DL (ref 20–275)
CREATININE: 0.58 MG/DL (ref 0.6–0.93)
EGFR IF AFRICN AM: 102 ML/MIN/1.73M2
EGFR IF NONAFRICN AM: 88 ML/MIN/1.73M2
GLOBULIN: 2.6 G/DL (CALC) (ref 1.9–3.7)
GLUCOSE: 134 MG/DL (ref 65–99)
GLUCOSE: NEGATIVE
HDL CHOLESTEROL: 51 MG/DL
HEMATOCRIT: 43.7 % (ref 35–45)
HEMOGLOBIN A1C: 6.3 % OF TOTAL HGB
HEMOGLOBIN: 14.9 G/DL (ref 11.7–15.5)
KETONES: NEGATIVE
LDL-CHOLESTEROL: 77 MG/DL (CALC)
MCH: 30.6 PG (ref 27–33)
MCHC: 34.1 G/DL (ref 32–36)
MCV: 89.7 FL (ref 80–100)
MICROALBUMIN/CREATININE RATIO, RANDOM URINE: 9 MCG/MG CREAT
MICROALBUMIN: 0.3 MG/DL
MPV: 12.8 FL (ref 7.5–12.5)
NITRITE: NEGATIVE
NON-HDL CHOLESTEROL: 94 MG/DL (CALC)
OCCULT BLOOD: NEGATIVE
PH: 7.5 (ref 5–8)
PLATELET COUNT: 213 THOUSAND/UL (ref 140–400)
POTASSIUM: 4.7 MMOL/L (ref 3.5–5.3)
PROTEIN, TOTAL: 7.2 G/DL (ref 6.1–8.1)
PROTEIN: NEGATIVE
RDW: 13.1 % (ref 11–15)
RED BLOOD CELL COUNT: 4.87 MILLION/UL (ref 3.8–5.1)
SODIUM: 136 MMOL/L (ref 135–146)
SPECIFIC GRAVITY: 1.01 (ref 1–1.03)
TRIGLYCERIDES: 85 MG/DL
WHITE BLOOD CELL COUNT: 6.7 THOUSAND/UL (ref 3.8–10.8)

## 2020-02-24 ENCOUNTER — OFFICE VISIT (OUTPATIENT)
Dept: FAMILY MEDICINE CLINIC | Facility: CLINIC | Age: 80
End: 2020-02-24
Payer: COMMERCIAL

## 2020-02-24 ENCOUNTER — OFFICE VISIT (OUTPATIENT)
Dept: DERMATOLOGY CLINIC | Facility: CLINIC | Age: 80
End: 2020-02-24
Payer: COMMERCIAL

## 2020-02-24 VITALS
SYSTOLIC BLOOD PRESSURE: 108 MMHG | DIASTOLIC BLOOD PRESSURE: 70 MMHG | HEIGHT: 57 IN | BODY MASS INDEX: 22.65 KG/M2 | HEART RATE: 92 BPM | WEIGHT: 105 LBS | OXYGEN SATURATION: 96 %

## 2020-02-24 DIAGNOSIS — E11.3293 CONTROLLED TYPE 2 DIABETES MELLITUS WITH BOTH EYES AFFECTED BY MILD NONPROLIFERATIVE RETINOPATHY WITHOUT MACULAR EDEMA, WITHOUT LONG-TERM CURRENT USE OF INSULIN (HCC): ICD-10-CM

## 2020-02-24 DIAGNOSIS — K58.8 OTHER IRRITABLE BOWEL SYNDROME: ICD-10-CM

## 2020-02-24 DIAGNOSIS — E78.00 HYPERCHOLESTEREMIA: ICD-10-CM

## 2020-02-24 DIAGNOSIS — Z85.3 PERSONAL HISTORY OF BREAST CANCER: ICD-10-CM

## 2020-02-24 DIAGNOSIS — Z13.31 DEPRESSION SCREENING: ICD-10-CM

## 2020-02-24 DIAGNOSIS — C44.712 BASAL CELL CARCINOMA (BCC) OF RIGHT LOWER EXTREMITY: ICD-10-CM

## 2020-02-24 DIAGNOSIS — H40.003 GLAUCOMA SUSPECT OF BOTH EYES: ICD-10-CM

## 2020-02-24 DIAGNOSIS — Z23 NEED FOR VACCINATION: ICD-10-CM

## 2020-02-24 DIAGNOSIS — H35.372 EPIRETINAL MEMBRANE (ERM) OF LEFT EYE: ICD-10-CM

## 2020-02-24 DIAGNOSIS — M85.89 OSTEOPENIA OF MULTIPLE SITES: ICD-10-CM

## 2020-02-24 DIAGNOSIS — L82.1 SEBORRHEIC KERATOSES: ICD-10-CM

## 2020-02-24 DIAGNOSIS — Z96.1 PSEUDOPHAKIA OF BOTH EYES: ICD-10-CM

## 2020-02-24 DIAGNOSIS — I10 ESSENTIAL HYPERTENSION: ICD-10-CM

## 2020-02-24 DIAGNOSIS — K64.8 INTERNAL HEMORRHOIDS: ICD-10-CM

## 2020-02-24 DIAGNOSIS — Z85.828 HISTORY OF BASAL CELL CARCINOMA: Primary | ICD-10-CM

## 2020-02-24 DIAGNOSIS — K57.30 DIVERTICULOSIS OF COLON: ICD-10-CM

## 2020-02-24 DIAGNOSIS — Z90.11 STATUS POST RIGHT MASTECTOMY: ICD-10-CM

## 2020-02-24 DIAGNOSIS — Z00.00 ENCOUNTER FOR ANNUAL HEALTH EXAMINATION: Primary | ICD-10-CM

## 2020-02-24 DIAGNOSIS — M62.838 TRAPEZIUS MUSCLE SPASM: ICD-10-CM

## 2020-02-24 DIAGNOSIS — Q43.8 TORTUOUS COLON: ICD-10-CM

## 2020-02-24 DIAGNOSIS — Z23 FLU VACCINE NEED: ICD-10-CM

## 2020-02-24 DIAGNOSIS — Z85.828 HISTORY OF BASAL CELL CARCINOMA: ICD-10-CM
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
## 2020-02-24 PROCEDURE — G0463 HOSPITAL OUTPT CLINIC VISIT: HCPCS | Performed by: DERMATOLOGY

## 2020-02-24 PROCEDURE — G0439 PPPS, SUBSEQ VISIT: HCPCS | Performed by: FAMILY MEDICINE

## 2020-02-24 PROCEDURE — 99397 PER PM REEVAL EST PAT 65+ YR: CPT | Performed by: FAMILY MEDICINE

## 2020-02-24 PROCEDURE — 99212 OFFICE O/P EST SF 10 MIN: CPT | Performed by: DERMATOLOGY

## 2020-02-24 PROCEDURE — 96160 PT-FOCUSED HLTH RISK ASSMT: CPT | Performed by: FAMILY MEDICINE

## 2020-02-24 RX ORDER — ATORVASTATIN CALCIUM 10 MG/1
5 TABLET, FILM COATED ORAL NIGHTLY
Qty: 90 TABLET | Refills: 3 | Status: SHIPPED | OUTPATIENT
Start: 2020-02-24 | End: 2020-08-07

## 2020-02-24 RX ORDER — CYCLOBENZAPRINE HCL 5 MG
5 TABLET ORAL NIGHTLY PRN
Qty: 14 TABLET | Refills: 0 | Status: SHIPPED | OUTPATIENT
Start: 2020-02-24 | End: 2020-12-01 | Stop reason: ALTCHOICE

## 2020-02-24 RX ORDER — ENALAPRIL MALEATE 2.5 MG/1
2.5 TABLET ORAL
Qty: 90 TABLET | Refills: 1 | Status: SHIPPED | OUTPATIENT
Start: 2020-02-24 | End: 2020-08-07

## 2020-02-24 NOTE — PATIENT INSTRUCTIONS
Carmelo Ghotra's SCREENING SCHEDULE   Tests on this list are recommended by your physician but may not be covered, or covered at this frequency, by your insurer. Please check with your insurance carrier before scheduling to verify coverage.    Geovanna Burris Colonoscopy Screen   Covered every 10 years- more often if abnormal Colonoscopy due on 11/28/2020 Update Health Maintenance if applicable    Flex Sigmoidoscopy Screen  Covered every 5 years No results found for this or any previous visit.  No flowsheet data after 65 Orders placed or performed in visit on 02/24/20   • PNEUMOCOCCAL VACC, 13 ISA IM    Please get once after your 65th birthday    Pneumococcal 23 (Pneumovax)  Covered Once after 65 No orders found for this or any previous visit.  Please get once afte

## 2020-02-24 NOTE — PROGRESS NOTES
HPI:   Vanderbilt Fabry is a 79year old female who presents for a Medicare Subsequent Annual Wellness visit (Pt already had Initial Annual Wellness). Doing well. Stable  Intermittent muscle tension in shoulder/neck area.  No n/t in UE    Annual Physi risk.    Patient Care Team: Patient Care Team:  Nila Cerna MD as PCP - General (Family Medicine)    Patient Active Problem List:     Essential hypertension     Hypercholesteremia     Controlled type 2 diabetes mellitus with both eyes affecte High cholesterol, Hyperlipidemia, and Visual impairment. She  has a past surgical history that includes biopsy of skin lesion; mastectomy right (2001); cataract; and skin tissue rearrangement (Right, 11/22/2019).     Her family history includes Alcohol a membranes are normal.   Eyes: Pupils are equal, round, and reactive to light. Conjunctivae are normal.   Neck: Neck supple. No thyromegaly present. Cardiovascular: Normal rate, regular rhythm and normal heart sounds. No murmur heard.    Edema not prese intake, regular exercise, DASH/heart healthy eating  -monitor home BP daily and maintain log; if elevated reading >160/100 notify Md.       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 Return in about 6 months (around 8/24/2020) for Diabetes follow-up, Blood Pressure follow-up, obtain fasting bloodwork prior to next appointment.      Sam Blackman MD, 2/24/2020     General Health     In the past six 5 yrs age 33-67, age 72 and older at high risk There are no preventive care reminders to display for this patient. Update Health Maintenance if applicable    Chlamydia  Annually if high risk No results found for: CHLAMYDIA No flowsheet data found.     Karissae DIGOXIN, DIG, VALP No flowsheet data found. Diabetes      HgbA1C  Annually HEMOGLOBIN A1c (% of total Hgb)   Date Value   02/20/2020 6.3 (H)       No flowsheet data found.     Creat/alb ratio  Annually No results found for: Irene Benz

## 2020-02-24 NOTE — PROGRESS NOTES
HPI:     Chief Complaint     Basal Cell Carcinoma        HPI     Basal Cell Carcinoma      Additional comments: LOV 11/4/2019.  Pt presenting for f/u with R leg tibia           Last edited by Nupur Santiago LPN on 5/15/1390  5:43 AM. (History)          P 2/2 breast CA, with lymph node removal   • SKIN TISSUE REARRANGEMENT Right 11/22/2019    Wide excision BCC right leg,flap reconstruction     Social History    Socioeconomic History      Marital status:       Spouse name: Not on file      Number o Narrative      Not on file    Family History   Problem Relation Age of Onset   • Heart Attack Father    • Stroke Mother    • No Known Problems Daughter    • Alcohol and Other Disorders Associated Son        PHYSICAL EXAM:   Patient is alert and oriented an

## 2020-08-06 ENCOUNTER — TELEPHONE (OUTPATIENT)
Dept: FAMILY MEDICINE CLINIC | Facility: CLINIC | Age: 80
End: 2020-08-06

## 2020-08-06 DIAGNOSIS — E78.00 HYPERCHOLESTEREMIA: ICD-10-CM

## 2020-08-06 DIAGNOSIS — I10 ESSENTIAL HYPERTENSION: ICD-10-CM

## 2020-08-06 NOTE — TELEPHONE ENCOUNTER
Pt called requesting refills for Enalapril Maleate 2.5 MG Oral Tab  atorvastatin 10 MG Oral Tab  She has an appt on 08/12 and per pt, she understands she needs to be seen, but she would rather not come in because of her age and being more vulnerable on get

## 2020-08-07 RX ORDER — ENALAPRIL MALEATE 2.5 MG/1
2.5 TABLET ORAL
Qty: 90 TABLET | Refills: 0 | Status: SHIPPED | OUTPATIENT
Start: 2020-08-07 | End: 2020-08-26

## 2020-08-07 RX ORDER — ATORVASTATIN CALCIUM 10 MG/1
5 TABLET, FILM COATED ORAL NIGHTLY
Qty: 90 TABLET | Refills: 0 | Status: SHIPPED | OUTPATIENT
Start: 2020-08-07 | End: 2021-05-19

## 2020-08-07 NOTE — TELEPHONE ENCOUNTER
Ok to refill max of 3mo. However make sure pt understand  refills are being provided without any knowledge of current blood pressure/diabetes/cholesterol control as well as kidney/liver function and is aware of the risks.

## 2020-08-07 NOTE — TELEPHONE ENCOUNTER
Spoke to patient. Patient has or needs upcoming appointment for chronic medical conditions. Given the current Coronavirus outbreak, discussed options to patient regarding maintaining and/or rescheduling appointment to allow for self-quarantine at home.

## 2020-08-24 ENCOUNTER — APPOINTMENT (OUTPATIENT)
Dept: LAB | Facility: HOSPITAL | Age: 80
End: 2020-08-24
Attending: INTERNAL MEDICINE
Payer: MEDICARE

## 2020-08-24 ENCOUNTER — OFFICE VISIT (OUTPATIENT)
Dept: FAMILY MEDICINE CLINIC | Facility: CLINIC | Age: 80
End: 2020-08-24
Payer: COMMERCIAL

## 2020-08-24 ENCOUNTER — HOSPITAL ENCOUNTER (OUTPATIENT)
Dept: GENERAL RADIOLOGY | Facility: HOSPITAL | Age: 80
Discharge: HOME | End: 2020-08-24
Attending: INTERNAL MEDICINE
Payer: MEDICARE

## 2020-08-24 VITALS
SYSTOLIC BLOOD PRESSURE: 118 MMHG | HEART RATE: 91 BPM | OXYGEN SATURATION: 97 % | HEIGHT: 57 IN | WEIGHT: 108.81 LBS | DIASTOLIC BLOOD PRESSURE: 60 MMHG | BODY MASS INDEX: 23.48 KG/M2

## 2020-08-24 DIAGNOSIS — M54.50 LUMBAR BACK PAIN: ICD-10-CM

## 2020-08-24 DIAGNOSIS — E11.3293 CONTROLLED TYPE 2 DIABETES MELLITUS WITH BOTH EYES AFFECTED BY MILD NONPROLIFERATIVE RETINOPATHY WITHOUT MACULAR EDEMA, WITHOUT LONG-TERM CURRENT USE OF INSULIN (HCC): ICD-10-CM

## 2020-08-24 DIAGNOSIS — M54.50 LUMBAR BACK PAIN: Primary | ICD-10-CM

## 2020-08-24 DIAGNOSIS — I10 ESSENTIAL HYPERTENSION: ICD-10-CM

## 2020-08-24 PROCEDURE — 3074F SYST BP LT 130 MM HG: CPT | Performed by: INTERNAL MEDICINE

## 2020-08-24 PROCEDURE — 3078F DIAST BP <80 MM HG: CPT | Performed by: INTERNAL MEDICINE

## 2020-08-24 PROCEDURE — 72110 X-RAY EXAM L-2 SPINE 4/>VWS: CPT | Performed by: INTERNAL MEDICINE

## 2020-08-24 PROCEDURE — 3008F BODY MASS INDEX DOCD: CPT | Performed by: INTERNAL MEDICINE

## 2020-08-24 PROCEDURE — 99214 OFFICE O/P EST MOD 30 MIN: CPT | Performed by: INTERNAL MEDICINE

## 2020-08-24 RX ORDER — CYCLOBENZAPRINE HCL 5 MG
5 TABLET ORAL NIGHTLY PRN
Qty: 30 TABLET | Refills: 1 | Status: SHIPPED | OUTPATIENT
Start: 2020-08-24 | End: 2020-09-23

## 2020-08-24 RX ORDER — NAPROXEN 500 MG/1
500 TABLET ORAL 2 TIMES DAILY WITH MEALS
Qty: 30 TABLET | Refills: 0 | Status: SHIPPED | OUTPATIENT
Start: 2020-08-24 | End: 2020-12-01 | Stop reason: ALTCHOICE

## 2020-08-24 NOTE — PROGRESS NOTES
[facility]  Return Patient Progress Note      HPI:   Patient presents with:  Leg Pain: Left leg pain      Sravani Noe is a 79year old female presenting for: left leg pain.       Has a significant  has a past medical history of is received without fixative labeled \"Ghotra, right lower leg lesion\" and consists of an oval portion of tan skin measuring 1.8 x 1.5 x 0.4 cm.  The epidermal surface demonstrates an oval, slightly raised, eccentric crust measuring 0.8 x 0.5 x < 0.1 cm by mouth 2 (two) times daily with meals. 30 tablet 0   • cyclobenzaprine 5 MG Oral Tab Take 1 tablet (5 mg total) by mouth nightly as needed for Muscle spasms. 30 tablet 1   • atorvastatin 10 MG Oral Tab Take 0.5 tablets (5 mg total) by mouth nightly.  90 t unexpected weight change. HENT: Negative for congestion, ear pain, hearing loss, rhinorrhea and sore throat. Eyes: Negative for pain, redness and visual disturbance.    Respiratory: Negative for apnea, cough, chest tightness, shortness of breath and wh of motion. No thyromegaly present. Cardiovascular: Normal rate, regular rhythm, S1 normal, S2 normal, normal heart sounds and intact distal pulses. Exam reveals no gallop and no friction rub. No murmur heard. Edema not present.   Pulmonary/Chest: Ef due on 08/20/2020  Colonoscopy due on 11/28/2020  Influenza Vaccine(1) due on 09/01/2020  Diabetes Care Dilated Eye Exam due on 01/31/2021  Fall Risk Screening due on 02/24/2021  Annual Depression Screen due on 02/24/2021  Annual Physical due on 02/24/2021

## 2020-08-25 LAB
ALBUMIN/GLOBULIN RATIO: 1.9 (CALC) (ref 1–2.5)
ALBUMIN: 4.5 G/DL (ref 3.6–5.1)
ALKALINE PHOSPHATASE: 120 U/L (ref 37–153)
ALT: 12 U/L (ref 6–29)
AST: 18 U/L (ref 10–35)
BILIRUBIN, TOTAL: 0.4 MG/DL (ref 0.2–1.2)
BUN/CREATININE RATIO: 17 (CALC) (ref 6–22)
BUN: 10 MG/DL (ref 7–25)
CALCIUM: 9.7 MG/DL (ref 8.6–10.4)
CARBON DIOXIDE: 24 MMOL/L (ref 20–32)
CHLORIDE: 102 MMOL/L (ref 98–110)
CREATININE: 0.59 MG/DL (ref 0.6–0.93)
EGFR IF AFRICN AM: 101 ML/MIN/1.73M2
EGFR IF NONAFRICN AM: 87 ML/MIN/1.73M2
GLOBULIN: 2.4 G/DL (CALC) (ref 1.9–3.7)
GLUCOSE: 219 MG/DL (ref 65–99)
HEMOGLOBIN A1C: 7.3 % OF TOTAL HGB
POTASSIUM: 4.5 MMOL/L (ref 3.5–5.3)
PROTEIN, TOTAL: 6.9 G/DL (ref 6.1–8.1)
SODIUM: 136 MMOL/L (ref 135–146)

## 2020-08-26 DIAGNOSIS — I10 ESSENTIAL HYPERTENSION: ICD-10-CM

## 2020-08-26 RX ORDER — ENALAPRIL MALEATE 2.5 MG/1
2.5 TABLET ORAL
Qty: 90 TABLET | Refills: 0 | Status: SHIPPED | OUTPATIENT
Start: 2020-08-26 | End: 2020-12-01

## 2020-08-27 ENCOUNTER — TELEPHONE (OUTPATIENT)
Dept: FAMILY MEDICINE CLINIC | Facility: CLINIC | Age: 80
End: 2020-08-27

## 2020-08-27 NOTE — TELEPHONE ENCOUNTER
----- Message from Tessa Gutierrez MD sent at 8/26/2020  7:55 PM CDT -----  Inform her that renal function is normal.  Her hemoglobin A1C increased and needs to set up follow up to discuss treatment and diabetic care.   She should schedule inperson visit

## 2020-08-27 NOTE — TELEPHONE ENCOUNTER
Results were discussed with patient and she verbalized understanding, she is scheduled to be seen next week by Dr Roman Peralta.

## 2020-08-31 ENCOUNTER — OFFICE VISIT (OUTPATIENT)
Dept: FAMILY MEDICINE CLINIC | Facility: CLINIC | Age: 80
End: 2020-08-31
Payer: COMMERCIAL

## 2020-08-31 VITALS
WEIGHT: 109 LBS | OXYGEN SATURATION: 97 % | BODY MASS INDEX: 24 KG/M2 | SYSTOLIC BLOOD PRESSURE: 112 MMHG | DIASTOLIC BLOOD PRESSURE: 62 MMHG | HEART RATE: 82 BPM

## 2020-08-31 DIAGNOSIS — Z12.31 SCREENING MAMMOGRAM, ENCOUNTER FOR: Primary | ICD-10-CM

## 2020-08-31 PROCEDURE — 99214 OFFICE O/P EST MOD 30 MIN: CPT | Performed by: INTERNAL MEDICINE

## 2020-08-31 PROCEDURE — 3074F SYST BP LT 130 MM HG: CPT | Performed by: INTERNAL MEDICINE

## 2020-08-31 PROCEDURE — 3078F DIAST BP <80 MM HG: CPT | Performed by: INTERNAL MEDICINE

## 2020-09-01 NOTE — PROGRESS NOTES
[facility]  Return Patient Progress Note      HPI:   Patient presents with:  Diabetes  Lab Results      Brett Sr is a 79year old female presenting for: left leg pain.       Has a significant  has a past medical history of BC 08/24/2020 12:02 PM    ALB 4.5 08/24/2020 12:02 PM    ALKPHO 120 08/24/2020 12:02 PM    AST 18 08/24/2020 12:02 PM    ALT 12 08/24/2020 12:02 PM    BILT 0.4 08/24/2020 12:02 PM        Hemoglobin A1C, Microalbumin  Lab Results   Component Value Date/Time removal   • SKIN TISSUE REARRANGEMENT Right 11/22/2019    Wide excision BCC right leg,flap reconstruction       Allergies:  No Known Allergies   Social History:  Social History    Tobacco Use      Smoking status: Never Smoker      Smokeless tobacco: Never 82   Wt 109 lb (49.4 kg)   SpO2 97%   BMI 23.59 kg/m²  Estimated body mass index is 23.59 kg/m² as calculated from the following:    Height as of 8/24/20: 57\". Weight as of this encounter: 109 lb (49.4 kg).    Wt Readings from Last 3 Encounters:  08/31/ ASSESSMENT AND PLAN:   Patient is a 79year old female who presents primarily presents for:    1. Lumbar back pain  - Has xray lumbar back comleted last week. Results as above.   She is feeling much better on medications as below.    -Naproxen and Fl questions answered. Notified to call with any questions, complications, allergies, or worsening or changing symptoms as well as any side effects or complications from the treatments . Red flags/ ER precautions discussed.         Yojana Haq MD  Intern

## 2020-11-24 ENCOUNTER — NURSE ONLY (OUTPATIENT)
Dept: FAMILY MEDICINE CLINIC | Facility: CLINIC | Age: 80
End: 2020-11-24
Payer: COMMERCIAL

## 2020-11-24 DIAGNOSIS — E11.3293 CONTROLLED TYPE 2 DIABETES MELLITUS WITH BOTH EYES AFFECTED BY MILD NONPROLIFERATIVE RETINOPATHY WITHOUT MACULAR EDEMA, WITHOUT LONG-TERM CURRENT USE OF INSULIN (HCC): Primary | ICD-10-CM

## 2020-11-24 PROCEDURE — 36415 COLL VENOUS BLD VENIPUNCTURE: CPT | Performed by: INTERNAL MEDICINE

## 2020-12-01 ENCOUNTER — OFFICE VISIT (OUTPATIENT)
Dept: FAMILY MEDICINE CLINIC | Facility: CLINIC | Age: 80
End: 2020-12-01
Payer: COMMERCIAL

## 2020-12-01 ENCOUNTER — HOSPITAL ENCOUNTER (OUTPATIENT)
Dept: GENERAL RADIOLOGY | Facility: HOSPITAL | Age: 80
Discharge: HOME | End: 2020-12-01
Attending: INTERNAL MEDICINE
Payer: MEDICARE

## 2020-12-01 VITALS
OXYGEN SATURATION: 97 % | WEIGHT: 106 LBS | HEART RATE: 96 BPM | SYSTOLIC BLOOD PRESSURE: 132 MMHG | BODY MASS INDEX: 23 KG/M2 | DIASTOLIC BLOOD PRESSURE: 60 MMHG

## 2020-12-01 DIAGNOSIS — Z12.31 SCREENING MAMMOGRAM, ENCOUNTER FOR: ICD-10-CM

## 2020-12-01 DIAGNOSIS — M54.2 NECK PAIN: ICD-10-CM

## 2020-12-01 DIAGNOSIS — G89.29 CHRONIC RIGHT SHOULDER PAIN: ICD-10-CM

## 2020-12-01 DIAGNOSIS — M25.511 CHRONIC RIGHT SHOULDER PAIN: ICD-10-CM

## 2020-12-01 DIAGNOSIS — E78.00 HYPERCHOLESTEREMIA: ICD-10-CM

## 2020-12-01 DIAGNOSIS — Z01.00 DIABETIC EYE EXAM (HCC): Primary | ICD-10-CM

## 2020-12-01 DIAGNOSIS — I10 ESSENTIAL HYPERTENSION: ICD-10-CM

## 2020-12-01 DIAGNOSIS — E11.9 DIABETIC EYE EXAM (HCC): Primary | ICD-10-CM

## 2020-12-01 PROCEDURE — 3078F DIAST BP <80 MM HG: CPT | Performed by: INTERNAL MEDICINE

## 2020-12-01 PROCEDURE — 99214 OFFICE O/P EST MOD 30 MIN: CPT | Performed by: INTERNAL MEDICINE

## 2020-12-01 PROCEDURE — 73030 X-RAY EXAM OF SHOULDER: CPT | Performed by: INTERNAL MEDICINE

## 2020-12-01 PROCEDURE — 72050 X-RAY EXAM NECK SPINE 4/5VWS: CPT | Performed by: INTERNAL MEDICINE

## 2020-12-01 PROCEDURE — 3075F SYST BP GE 130 - 139MM HG: CPT | Performed by: INTERNAL MEDICINE

## 2020-12-01 RX ORDER — ENALAPRIL MALEATE 2.5 MG/1
2.5 TABLET ORAL
Qty: 90 TABLET | Refills: 0 | Status: SHIPPED | OUTPATIENT
Start: 2020-12-01 | End: 2021-03-10

## 2020-12-01 NOTE — PROGRESS NOTES
[facility]  Return Patient Progress Note      HPI:   Patient presents with:  Diabetes: 3 months follow up  Lab Results      Kathya Cordon is a 79year old female presenting for: Lab follow up.        Has a significant  has a past AM    BILT 0.8 11/24/2020 10:18 AM        Hemoglobin A1C, Microalbumin  Lab Results   Component Value Date/Time    A1C 6.8 (H) 11/24/2020 10:18 AM        Lipid panel  Lab Results   Component Value Date/Time    CHOLEST 145 02/20/2020 08:56 AM    HDL 51 02/2 REVIEW OF SYSTEMS:   Review of Systems   Constitutional: Negative for chills, fatigue, fever and unexpected weight change. HENT: Negative for congestion, ear pain, hearing loss, rhinorrhea and sore throat.     Eyes: Negative for pain, redness an Mouth/Throat: Oropharynx is clear and moist.   Eyes: Pupils are equal, round, and reactive to light. Conjunctivae and EOM are normal.   Neck: Normal range of motion. No thyromegaly present.    Cardiovascular: Normal rate, regular rhythm, S1 normal, S2 nor visit.      4. [facility]  - [facility]    5. Screening mammogram, encounter for  - [facility] SCREENING BILAT (CPT=77067); Future    5. Chronic right shoulder pain  - XR SHOULDER, COMPLETE (MIN 2 VIEWS), RIGHT (CPT=73030); Future    6.

## 2021-01-04 ENCOUNTER — TELEPHONE (OUTPATIENT)
Dept: FAMILY MEDICINE CLINIC | Facility: CLINIC | Age: 81
End: 2021-01-04

## 2021-01-04 NOTE — TELEPHONE ENCOUNTER
Mayco Mcintosh MD   12/8/2020 12:10 PM      Please inform her that I reviewed both shoulder xray and cervical neck xray.  Cervical neck with advanced spondylosis.  Shoulder xray with mild OA.  I recommend considering physical therapy as that will be th

## 2021-01-15 ENCOUNTER — TELEPHONE (OUTPATIENT)
Dept: FAMILY MEDICINE CLINIC | Facility: CLINIC | Age: 81
End: 2021-01-15

## 2021-01-15 DIAGNOSIS — E11.3293 CONTROLLED TYPE 2 DIABETES MELLITUS WITH BOTH EYES AFFECTED BY MILD NONPROLIFERATIVE RETINOPATHY WITHOUT MACULAR EDEMA, WITHOUT LONG-TERM CURRENT USE OF INSULIN (HCC): ICD-10-CM

## 2021-01-15 DIAGNOSIS — E11.9 DIABETIC EYE EXAM (HCC): Primary | ICD-10-CM

## 2021-01-15 DIAGNOSIS — H40.003 GLAUCOMA SUSPECT OF BOTH EYES: ICD-10-CM

## 2021-01-15 DIAGNOSIS — Z01.00 DIABETIC EYE EXAM (HCC): Primary | ICD-10-CM

## 2021-01-15 NOTE — TELEPHONE ENCOUNTER
Dr. Florina Rossi office called stating the referral provided on 12/4 is not valid. PSR gave specialist's office authorization number and it is not coming back on their end.     Patient is still active with same insurance.  Has appt 2/12.     Will attempt to

## 2021-01-18 NOTE — TELEPHONE ENCOUNTER
Referral approved. Faxed to Dr. Frederic Landeros office [phone]    Copies of referral and authorization also mailed to patient.

## 2021-02-01 DIAGNOSIS — Z23 NEED FOR VACCINATION: ICD-10-CM

## 2021-02-08 ENCOUNTER — IMMUNIZATION (OUTPATIENT)
Dept: LAB | Facility: HOSPITAL | Age: 81
End: 2021-02-08
Attending: HOSPITALIST
Payer: MEDICARE

## 2021-02-08 DIAGNOSIS — Z23 NEED FOR VACCINATION: Primary | ICD-10-CM

## 2021-02-08 PROCEDURE — 0011A SARSCOV2 VAC 100MCG/0.5ML IM: CPT

## 2021-02-24 ENCOUNTER — TELEPHONE (OUTPATIENT)
Dept: FAMILY MEDICINE CLINIC | Facility: CLINIC | Age: 81
End: 2021-02-24

## 2021-03-08 ENCOUNTER — IMMUNIZATION (OUTPATIENT)
Dept: LAB | Facility: HOSPITAL | Age: 81
End: 2021-03-08
Attending: EMERGENCY MEDICINE
Payer: MEDICARE

## 2021-03-08 DIAGNOSIS — Z23 NEED FOR VACCINATION: Primary | ICD-10-CM

## 2021-03-08 PROCEDURE — 0012A SARSCOV2 VAC 100MCG/0.5ML IM: CPT

## 2021-03-10 ENCOUNTER — TELEPHONE (OUTPATIENT)
Dept: FAMILY MEDICINE CLINIC | Facility: CLINIC | Age: 81
End: 2021-03-10

## 2021-03-10 DIAGNOSIS — I10 ESSENTIAL HYPERTENSION: ICD-10-CM

## 2021-03-10 RX ORDER — ENALAPRIL MALEATE 2.5 MG/1
2.5 TABLET ORAL
Qty: 90 TABLET | Refills: 0 | Status: SHIPPED | OUTPATIENT
Start: 2021-03-10 | End: 2021-05-19

## 2021-03-10 NOTE — TELEPHONE ENCOUNTER
Pharmacy is requesting a refill on her Vasotec 2.5, ok per Dr Tracy Dawson to authorize a 90 day supply.

## 2021-04-10 ENCOUNTER — HOSPITAL ENCOUNTER (OUTPATIENT)
Dept: MAMMOGRAPHY | Facility: HOSPITAL | Age: 81
Discharge: HOME | End: 2021-04-10
Attending: INTERNAL MEDICINE
Payer: MEDICARE

## 2021-04-10 DIAGNOSIS — Z12.31 SCREENING MAMMOGRAM, ENCOUNTER FOR: ICD-10-CM

## 2021-04-10 PROCEDURE — 77063 BREAST TOMOSYNTHESIS BI: CPT | Performed by: INTERNAL MEDICINE

## 2021-04-10 PROCEDURE — 77067 SCR MAMMO BI INCL CAD: CPT | Performed by: INTERNAL MEDICINE

## 2021-04-13 ENCOUNTER — TELEPHONE (OUTPATIENT)
Dept: FAMILY MEDICINE CLINIC | Facility: CLINIC | Age: 81
End: 2021-04-13

## 2021-04-13 NOTE — TELEPHONE ENCOUNTER
lmtcb      ----- Message from Corine Valencia MD sent at 4/12/2021 12:21 PM CDT -----  Please ensure she knows that mammogram results are in, however, they are awaiting to compare it to previous mammogram results.   She has follow up in May with her PCP c

## 2021-04-13 NOTE — TELEPHONE ENCOUNTER
Patient along with her  called back and they both are aware that the hospital is awaiting on outiside records to compare them with her recent mammogram.

## 2021-05-05 ENCOUNTER — HOSPITAL ENCOUNTER (OUTPATIENT)
Dept: MAMMOGRAPHY | Facility: HOSPITAL | Age: 81
Discharge: HOME | End: 2021-05-05
Attending: INTERNAL MEDICINE
Payer: MEDICARE

## 2021-05-05 DIAGNOSIS — R92.8 ABNORMAL MAMMOGRAM: ICD-10-CM

## 2021-05-05 PROCEDURE — 77065 DX MAMMO INCL CAD UNI: CPT | Performed by: INTERNAL MEDICINE

## 2021-05-05 PROCEDURE — 77061 BREAST TOMOSYNTHESIS UNI: CPT | Performed by: INTERNAL MEDICINE

## 2021-05-12 ENCOUNTER — NURSE ONLY (OUTPATIENT)
Dept: FAMILY MEDICINE CLINIC | Facility: CLINIC | Age: 81
End: 2021-05-12
Payer: COMMERCIAL

## 2021-05-12 DIAGNOSIS — E78.00 HYPERCHOLESTEREMIA: ICD-10-CM

## 2021-05-12 DIAGNOSIS — E11.3293 CONTROLLED TYPE 2 DIABETES MELLITUS WITH BOTH EYES AFFECTED BY MILD NONPROLIFERATIVE RETINOPATHY WITHOUT MACULAR EDEMA, WITHOUT LONG-TERM CURRENT USE OF INSULIN (HCC): ICD-10-CM

## 2021-05-12 DIAGNOSIS — I10 ESSENTIAL HYPERTENSION: Primary | ICD-10-CM

## 2021-05-12 PROCEDURE — 36415 COLL VENOUS BLD VENIPUNCTURE: CPT | Performed by: INTERNAL MEDICINE

## 2021-05-19 ENCOUNTER — PATIENT MESSAGE (OUTPATIENT)
Dept: ADMINISTRATIVE | Age: 81
End: 2021-05-19

## 2021-05-19 ENCOUNTER — OFFICE VISIT (OUTPATIENT)
Dept: FAMILY MEDICINE CLINIC | Facility: CLINIC | Age: 81
End: 2021-05-19
Payer: COMMERCIAL

## 2021-05-19 VITALS
SYSTOLIC BLOOD PRESSURE: 100 MMHG | HEART RATE: 81 BPM | WEIGHT: 105.63 LBS | BODY MASS INDEX: 22.79 KG/M2 | HEIGHT: 57 IN | OXYGEN SATURATION: 97 % | DIASTOLIC BLOOD PRESSURE: 60 MMHG

## 2021-05-19 DIAGNOSIS — Z00.00 ENCOUNTER FOR ANNUAL HEALTH EXAMINATION: ICD-10-CM

## 2021-05-19 DIAGNOSIS — H40.003 GLAUCOMA SUSPECT OF BOTH EYES: ICD-10-CM

## 2021-05-19 DIAGNOSIS — Z96.1 PSEUDOPHAKIA OF BOTH EYES: ICD-10-CM

## 2021-05-19 DIAGNOSIS — E11.9 DIABETIC EYE EXAM (HCC): Primary | ICD-10-CM

## 2021-05-19 DIAGNOSIS — Z01.00 DIABETIC EYE EXAM (HCC): Primary | ICD-10-CM

## 2021-05-19 DIAGNOSIS — K58.8 OTHER IRRITABLE BOWEL SYNDROME: ICD-10-CM

## 2021-05-19 DIAGNOSIS — M25.511 CHRONIC PAIN OF BOTH SHOULDERS: ICD-10-CM

## 2021-05-19 DIAGNOSIS — Z85.3 PERSONAL HISTORY OF BREAST CANCER: ICD-10-CM

## 2021-05-19 DIAGNOSIS — K64.8 INTERNAL HEMORRHOIDS: ICD-10-CM

## 2021-05-19 DIAGNOSIS — Z12.11 COLON CANCER SCREENING: Primary | ICD-10-CM

## 2021-05-19 DIAGNOSIS — E11.3293 CONTROLLED TYPE 2 DIABETES MELLITUS WITH BOTH EYES AFFECTED BY MILD NONPROLIFERATIVE RETINOPATHY WITHOUT MACULAR EDEMA, WITHOUT LONG-TERM CURRENT USE OF INSULIN (HCC): ICD-10-CM

## 2021-05-19 DIAGNOSIS — H35.372 EPIRETINAL MEMBRANE (ERM) OF LEFT EYE: ICD-10-CM

## 2021-05-19 DIAGNOSIS — Z23 NEED FOR VACCINATION: ICD-10-CM

## 2021-05-19 DIAGNOSIS — I10 ESSENTIAL HYPERTENSION: ICD-10-CM

## 2021-05-19 DIAGNOSIS — Z90.11 STATUS POST RIGHT MASTECTOMY: ICD-10-CM

## 2021-05-19 DIAGNOSIS — K57.30 DIVERTICULOSIS OF COLON: ICD-10-CM

## 2021-05-19 DIAGNOSIS — Z85.828 HISTORY OF BASAL CELL CARCINOMA: ICD-10-CM

## 2021-05-19 DIAGNOSIS — E78.00 HYPERCHOLESTEREMIA: ICD-10-CM

## 2021-05-19 DIAGNOSIS — M25.512 CHRONIC PAIN OF BOTH SHOULDERS: ICD-10-CM

## 2021-05-19 DIAGNOSIS — M85.89 OSTEOPENIA OF MULTIPLE SITES: ICD-10-CM

## 2021-05-19 DIAGNOSIS — Q43.8 TORTUOUS COLON: ICD-10-CM

## 2021-05-19 DIAGNOSIS — G89.29 CHRONIC PAIN OF BOTH SHOULDERS: ICD-10-CM

## 2021-05-19 PROCEDURE — 90732 PPSV23 VACC 2 YRS+ SUBQ/IM: CPT | Performed by: FAMILY MEDICINE

## 2021-05-19 PROCEDURE — G0009 ADMIN PNEUMOCOCCAL VACCINE: HCPCS | Performed by: FAMILY MEDICINE

## 2021-05-19 PROCEDURE — 3074F SYST BP LT 130 MM HG: CPT | Performed by: FAMILY MEDICINE

## 2021-05-19 PROCEDURE — 3008F BODY MASS INDEX DOCD: CPT | Performed by: FAMILY MEDICINE

## 2021-05-19 PROCEDURE — 99397 PER PM REEVAL EST PAT 65+ YR: CPT | Performed by: FAMILY MEDICINE

## 2021-05-19 PROCEDURE — G0439 PPPS, SUBSEQ VISIT: HCPCS | Performed by: FAMILY MEDICINE

## 2021-05-19 PROCEDURE — 3078F DIAST BP <80 MM HG: CPT | Performed by: FAMILY MEDICINE

## 2021-05-19 PROCEDURE — 96160 PT-FOCUSED HLTH RISK ASSMT: CPT | Performed by: FAMILY MEDICINE

## 2021-05-19 RX ORDER — ATORVASTATIN CALCIUM 10 MG/1
5 TABLET, FILM COATED ORAL NIGHTLY
Qty: 90 TABLET | Refills: 3 | Status: SHIPPED | OUTPATIENT
Start: 2021-05-19 | End: 2021-08-24

## 2021-05-19 RX ORDER — ENALAPRIL MALEATE 2.5 MG/1
2.5 TABLET ORAL
Qty: 90 TABLET | Refills: 1 | Status: SHIPPED | OUTPATIENT
Start: 2021-05-19 | End: 2021-08-24

## 2021-05-28 ENCOUNTER — TELEPHONE (OUTPATIENT)
Dept: FAMILY MEDICINE CLINIC | Facility: CLINIC | Age: 81
End: 2021-05-28

## 2021-05-28 DIAGNOSIS — M25.512 CHRONIC PAIN OF BOTH SHOULDERS: Primary | ICD-10-CM

## 2021-05-28 DIAGNOSIS — M25.511 CHRONIC PAIN OF BOTH SHOULDERS: Primary | ICD-10-CM

## 2021-05-28 DIAGNOSIS — G89.29 CHRONIC PAIN OF BOTH SHOULDERS: Primary | ICD-10-CM

## 2021-05-28 NOTE — TELEPHONE ENCOUNTER
called stating that pt physical therapy is not covered by insurance and they where told that per visit will be $40.  Pt was recommended to get physical therapy at home through [facility] (ph# [phone]) and her ins will be able to cover

## 2021-06-04 NOTE — TELEPHONE ENCOUNTER
Phone number provided by patient ([phone]) went to [facility] ([place], [place]). Per contact, there is a [employer] [facility] group -- she states it is likely them as they (Carolyn) do not take patient's insurance.     Jose

## 2021-06-04 NOTE — TELEPHONE ENCOUNTER
Referred to Kapil Anne from [facility] (DP/SNF) notified. F: [phone]    Will follow-up with chart notes (LOV 5/19) upon completion.

## 2021-06-04 NOTE — TELEPHONE ENCOUNTER
Left voicemail for patient informing them that the referral has been sent to [facility]. ---    However, as of 10:15am, received call from Shraddha Morelos at [facility] stating they are not in-network with patient's insurance. Will attempt to refer to [facility].

## 2021-06-06 PROBLEM — M25.511 CHRONIC PAIN OF BOTH SHOULDERS: Status: ACTIVE | Noted: 2021-06-06

## 2021-06-06 PROBLEM — G89.29 CHRONIC PAIN OF BOTH SHOULDERS: Status: ACTIVE | Noted: 2021-06-06

## 2021-06-06 PROBLEM — M25.512 CHRONIC PAIN OF BOTH SHOULDERS: Status: ACTIVE | Noted: 2021-06-06

## 2021-06-06 NOTE — PROGRESS NOTES
HPI:   Светлана Castillo is a [de-identified]year old female who presents for a Medicare Subsequent Annual Wellness visit (Pt already had Initial Annual Wellness).     No acte issues  No topic due editable text        Fall/Risk Assessment   She has been screened for nonproliferative retinopathy without macular edema, without long-term current use of insulin (HCC)     Personal history of breast cancer     Status post right mastectomy     Epiretinal membrane (ERM) of left eye     Glaucoma suspect of both eyes     Pseudo self; Heart Attack in her father; No Known Problems in her daughter; Stroke in her mother. SOCIAL HISTORY:   She  reports that she has never smoked. She has never used smokeless tobacco. She reports current alcohol use.  She reports that she does not use There is no abdominal tenderness. Musculoskeletal:      Right shoulder: No tenderness or bony tenderness. Decreased range of motion. Normal strength. Normal pulse. Left shoulder: No tenderness or bony tenderness. Decreased range of motion.       Cerv control is well controlled   Last A1c value was 7.1% done 5/12/2021.     Recommendations are:   · Diet-control  · Advised weight and diabetic/lipid management with carbohydrate controlled ADA and/or low fat/cholesterol DASH diet and exercise (3 times a week Brandin's SCREENING SCHEDULE   Tests on this list are recommended by your physician but may not be covered, or covered at this frequency, by your insurer. Please check with your insurance carrier before scheduling to verify coverage.    PREVENTATIVE SER at this time   Pap and Pelvic    Pap   Covered every 2 years for women at normal risk;  Annually if at high risk -  No recommendations at this time    Chlamydia Annually if high risk -  No recommendations at this time   Screening Mammogram    Mammogram Component Value Date    BUN 10 05/12/2021       Drug Serum Conc Annually No results found for: DIGOXIN, DIG, VALP

## 2021-06-06 NOTE — PATIENT INSTRUCTIONS
Monica Ghotra's SCREENING SCHEDULE   Tests on this list are recommended by your physician but may not be covered, or covered at this frequency, by your insurer. Please check with your insurance carrier before scheduling to verify coverage.    PREVEN recommendations at this time   Pap and Pelvic    Pap   Covered every 2 years for women at normal risk;  Annually if at high risk -  No recommendations at this time    Chlamydia Annually if high risk -  No recommendations at this time   Screening Mammogram Results   Component Value Date    BUN 10 05/12/2021       Drug Serum Conc Annually No results found for: DIGOXIN, DIG, VALP              Recommended Websites for Advanced Directives    [email or web address]  An informa

## 2021-06-15 PROBLEM — C44.712 BASAL CELL CARCINOMA (BCC) OF RIGHT LOWER EXTREMITY: Status: RESOLVED | Noted: 2019-11-04 | Resolved: 2021-06-15

## 2021-08-21 ENCOUNTER — NURSE ONLY (OUTPATIENT)
Dept: FAMILY MEDICINE CLINIC | Facility: CLINIC | Age: 81
End: 2021-08-21
Payer: COMMERCIAL

## 2021-08-21 DIAGNOSIS — E11.3293 CONTROLLED TYPE 2 DIABETES MELLITUS WITH BOTH EYES AFFECTED BY MILD NONPROLIFERATIVE RETINOPATHY WITHOUT MACULAR EDEMA, WITHOUT LONG-TERM CURRENT USE OF INSULIN (HCC): Primary | ICD-10-CM

## 2021-08-21 PROCEDURE — 36415 COLL VENOUS BLD VENIPUNCTURE: CPT | Performed by: INTERNAL MEDICINE

## 2021-08-22 LAB
ALBUMIN/GLOBULIN RATIO: 1.5 (CALC) (ref 1–2.5)
ALBUMIN: 4.4 G/DL (ref 3.6–5.1)
ALKALINE PHOSPHATASE: 92 U/L (ref 37–153)
ALT: 17 U/L (ref 6–29)
AST: 19 U/L (ref 10–35)
BILIRUBIN, TOTAL: 0.7 MG/DL (ref 0.2–1.2)
BUN/CREATININE RATIO: 16 (CALC) (ref 6–22)
BUN: 8 MG/DL (ref 7–25)
CALCIUM: 10 MG/DL (ref 8.6–10.4)
CARBON DIOXIDE: 27 MMOL/L (ref 20–32)
CHLORIDE: 99 MMOL/L (ref 98–110)
CREATININE: 0.51 MG/DL (ref 0.6–0.88)
EGFR IF AFRICN AM: 105 ML/MIN/1.73M2
EGFR IF NONAFRICN AM: 91 ML/MIN/1.73M2
GLOBULIN: 2.9 G/DL (CALC) (ref 1.9–3.7)
GLUCOSE: 139 MG/DL (ref 65–99)
HEMOGLOBIN A1C: 6.6 % OF TOTAL HGB
POTASSIUM: 4.6 MMOL/L (ref 3.5–5.3)
PROTEIN, TOTAL: 7.3 G/DL (ref 6.1–8.1)
SODIUM: 134 MMOL/L (ref 135–146)

## 2021-08-24 ENCOUNTER — OFFICE VISIT (OUTPATIENT)
Dept: FAMILY MEDICINE CLINIC | Facility: CLINIC | Age: 81
End: 2021-08-24
Payer: COMMERCIAL

## 2021-08-24 VITALS
DIASTOLIC BLOOD PRESSURE: 60 MMHG | OXYGEN SATURATION: 96 % | SYSTOLIC BLOOD PRESSURE: 120 MMHG | WEIGHT: 105 LBS | HEART RATE: 79 BPM | BODY MASS INDEX: 23 KG/M2 | TEMPERATURE: 98 F

## 2021-08-24 DIAGNOSIS — M19.011 PRIMARY OSTEOARTHRITIS OF BOTH SHOULDERS: ICD-10-CM

## 2021-08-24 DIAGNOSIS — M19.012 PRIMARY OSTEOARTHRITIS OF BOTH SHOULDERS: ICD-10-CM

## 2021-08-24 DIAGNOSIS — E78.00 HYPERCHOLESTEREMIA: ICD-10-CM

## 2021-08-24 DIAGNOSIS — I10 ESSENTIAL HYPERTENSION: ICD-10-CM

## 2021-08-24 DIAGNOSIS — Z12.11 ENCOUNTER FOR SCREENING COLONOSCOPY: Primary | ICD-10-CM

## 2021-08-24 PROCEDURE — 3074F SYST BP LT 130 MM HG: CPT | Performed by: INTERNAL MEDICINE

## 2021-08-24 PROCEDURE — 3078F DIAST BP <80 MM HG: CPT | Performed by: INTERNAL MEDICINE

## 2021-08-24 PROCEDURE — 99214 OFFICE O/P EST MOD 30 MIN: CPT | Performed by: INTERNAL MEDICINE

## 2021-08-24 RX ORDER — ENALAPRIL MALEATE 2.5 MG/1
2.5 TABLET ORAL
Qty: 90 TABLET | Refills: 1 | Status: SHIPPED | OUTPATIENT
Start: 2021-08-24 | End: 2022-02-02

## 2021-08-24 RX ORDER — ATORVASTATIN CALCIUM 10 MG/1
5 TABLET, FILM COATED ORAL NIGHTLY
Qty: 90 TABLET | Refills: 1 | Status: SHIPPED | OUTPATIENT
Start: 2021-08-24

## 2021-08-26 NOTE — PROGRESS NOTES
[facility]  Return Patient Progress Note      HPI:   Patient presents with:  Diabetes  Test Results      Rios Daniel is a [de-identified]year old female presenting for: Lab follow up.        Has a significant  has a past medical history of Microalbumin  Lab Results   Component Value Date/Time    A1C 6.6 (H) 08/21/2021 09:00 AM        Lipid panel  Lab Results   Component Value Date/Time    CHOLEST 156 05/12/2021 11:29 AM    HDL 47 (L) 05/12/2021 11:29 AM    TRIG 137 05/12/2021 11:29 AM    LDL Negative for chills, fatigue, fever and unexpected weight change. HENT: Negative for congestion, ear pain, hearing loss, rhinorrhea and sore throat. Eyes: Negative for pain, redness and visual disturbance.    Respiratory: Negative for apnea, cough, lilia clear and moist.   Eyes: Pupils are equal, round, and reactive to light. Conjunctivae and EOM are normal.   Neck: No thyromegaly present. Cardiovascular: Normal rate, regular rhythm, S1 normal, S2 normal, normal heart sounds and intact distal pulses.   Ex OPHTHALMOLOGY - INTERNAL    Screening mammogram, encounter for  [facility]    Health Maintenance:  Pneumococcal PPSV23/PCV13 65+ Years / Low and Medium Risk(1 of 2 - PCV13) due on 01/07/2006 Declining. Colonoscopy due on 11/28/2020. Reminded to schedule.     I

## 2022-01-10 ENCOUNTER — TELEPHONE (OUTPATIENT)
Dept: FAMILY MEDICINE CLINIC | Facility: CLINIC | Age: 82
End: 2022-01-10

## 2022-01-10 DIAGNOSIS — E11.3293 CONTROLLED TYPE 2 DIABETES MELLITUS WITH BOTH EYES AFFECTED BY MILD NONPROLIFERATIVE RETINOPATHY WITHOUT MACULAR EDEMA, WITHOUT LONG-TERM CURRENT USE OF INSULIN (HCC): ICD-10-CM

## 2022-02-02 RX ORDER — ENALAPRIL MALEATE 2.5 MG/1
TABLET ORAL
Qty: 90 TABLET | Refills: 3 | Status: SHIPPED | OUTPATIENT
Start: 2022-02-02 | End: 2022-02-24

## 2022-02-04 ENCOUNTER — TELEPHONE (OUTPATIENT)
Dept: FAMILY MEDICINE CLINIC | Facility: CLINIC | Age: 82
End: 2022-02-04

## 2022-02-04 NOTE — TELEPHONE ENCOUNTER
Spouse of pt is calling stating pt needs a referral to make an appointment to go see dr. Stacia Chaidez.         Please advise

## 2022-02-04 NOTE — TELEPHONE ENCOUNTER
Dr. Tameka Duran is no longer actively practicing [has retired]. Will place a referral for Dr. Tran Her partner, Dr. Guadalupe Jones; pending Roque Lewis.

## 2022-02-21 ENCOUNTER — NURSE ONLY (OUTPATIENT)
Dept: FAMILY MEDICINE CLINIC | Facility: CLINIC | Age: 82
End: 2022-02-21
Payer: COMMERCIAL

## 2022-02-21 DIAGNOSIS — E11.3293 CONTROLLED TYPE 2 DIABETES MELLITUS WITH BOTH EYES AFFECTED BY MILD NONPROLIFERATIVE RETINOPATHY WITHOUT MACULAR EDEMA, WITHOUT LONG-TERM CURRENT USE OF INSULIN (HCC): ICD-10-CM

## 2022-02-21 PROCEDURE — 36415 COLL VENOUS BLD VENIPUNCTURE: CPT | Performed by: INTERNAL MEDICINE

## 2022-02-23 LAB — HEMOGLOBIN A1C: 6.3 % OF TOTAL HGB

## 2022-02-24 ENCOUNTER — OFFICE VISIT (OUTPATIENT)
Dept: FAMILY MEDICINE CLINIC | Facility: CLINIC | Age: 82
End: 2022-02-24
Payer: COMMERCIAL

## 2022-02-24 VITALS
TEMPERATURE: 97 F | OXYGEN SATURATION: 98 % | SYSTOLIC BLOOD PRESSURE: 144 MMHG | BODY MASS INDEX: 22.01 KG/M2 | WEIGHT: 102 LBS | HEIGHT: 57 IN | HEART RATE: 87 BPM | DIASTOLIC BLOOD PRESSURE: 62 MMHG

## 2022-02-24 DIAGNOSIS — Z85.828 HISTORY OF BASAL CELL CARCINOMA: ICD-10-CM

## 2022-02-24 DIAGNOSIS — E78.00 HYPERCHOLESTEREMIA: ICD-10-CM

## 2022-02-24 DIAGNOSIS — Z85.3 PERSONAL HISTORY OF BREAST CANCER: ICD-10-CM

## 2022-02-24 DIAGNOSIS — Z00.00 MEDICARE ANNUAL WELLNESS VISIT, SUBSEQUENT: Primary | ICD-10-CM

## 2022-02-24 DIAGNOSIS — Z00.00 ENCOUNTER FOR ANNUAL HEALTH EXAMINATION: ICD-10-CM

## 2022-02-24 DIAGNOSIS — K58.8 OTHER IRRITABLE BOWEL SYNDROME: ICD-10-CM

## 2022-02-24 DIAGNOSIS — G89.29 CHRONIC PAIN OF BOTH SHOULDERS: ICD-10-CM

## 2022-02-24 DIAGNOSIS — I10 ESSENTIAL HYPERTENSION: ICD-10-CM

## 2022-02-24 DIAGNOSIS — K57.30 DIVERTICULOSIS OF COLON: ICD-10-CM

## 2022-02-24 DIAGNOSIS — E11.3293 CONTROLLED TYPE 2 DIABETES MELLITUS WITH BOTH EYES AFFECTED BY MILD NONPROLIFERATIVE RETINOPATHY WITHOUT MACULAR EDEMA, WITHOUT LONG-TERM CURRENT USE OF INSULIN (HCC): ICD-10-CM

## 2022-02-24 DIAGNOSIS — Z90.11 STATUS POST RIGHT MASTECTOMY: ICD-10-CM

## 2022-02-24 DIAGNOSIS — M25.511 CHRONIC PAIN OF BOTH SHOULDERS: ICD-10-CM

## 2022-02-24 DIAGNOSIS — H35.372 EPIRETINAL MEMBRANE (ERM) OF LEFT EYE: ICD-10-CM

## 2022-02-24 DIAGNOSIS — M25.512 CHRONIC PAIN OF BOTH SHOULDERS: ICD-10-CM

## 2022-02-24 DIAGNOSIS — Q43.8 TORTUOUS COLON: ICD-10-CM

## 2022-02-24 DIAGNOSIS — M85.89 OSTEOPENIA OF MULTIPLE SITES: ICD-10-CM

## 2022-02-24 DIAGNOSIS — Z96.1 PSEUDOPHAKIA OF BOTH EYES: ICD-10-CM

## 2022-02-24 DIAGNOSIS — H40.003 GLAUCOMA SUSPECT OF BOTH EYES: ICD-10-CM

## 2022-02-24 PROCEDURE — 99397 PER PM REEVAL EST PAT 65+ YR: CPT | Performed by: INTERNAL MEDICINE

## 2022-02-24 PROCEDURE — 96160 PT-FOCUSED HLTH RISK ASSMT: CPT | Performed by: INTERNAL MEDICINE

## 2022-02-24 PROCEDURE — 3008F BODY MASS INDEX DOCD: CPT | Performed by: INTERNAL MEDICINE

## 2022-02-24 PROCEDURE — G0439 PPPS, SUBSEQ VISIT: HCPCS | Performed by: INTERNAL MEDICINE

## 2022-02-24 PROCEDURE — 3078F DIAST BP <80 MM HG: CPT | Performed by: INTERNAL MEDICINE

## 2022-02-24 PROCEDURE — 3077F SYST BP >= 140 MM HG: CPT | Performed by: INTERNAL MEDICINE

## 2022-02-24 RX ORDER — ENALAPRIL MALEATE 2.5 MG/1
2.5 TABLET ORAL DAILY
Qty: 90 TABLET | Refills: 3 | Status: SHIPPED | OUTPATIENT
Start: 2022-02-24

## 2022-02-24 RX ORDER — ATORVASTATIN CALCIUM 10 MG/1
5 TABLET, FILM COATED ORAL NIGHTLY
Qty: 90 TABLET | Refills: 1 | Status: SHIPPED | OUTPATIENT
Start: 2022-02-24

## 2022-02-27 PROBLEM — K64.8 INTERNAL HEMORRHOIDS: Status: RESOLVED | Noted: 2017-11-28 | Resolved: 2022-02-27

## 2022-05-06 ENCOUNTER — TELEPHONE (OUTPATIENT)
Dept: INTERNAL MEDICINE CLINIC | Facility: CLINIC | Age: 82
End: 2022-05-06

## 2022-05-31 ENCOUNTER — HOSPITAL ENCOUNTER (OUTPATIENT)
Dept: ULTRASOUND IMAGING | Facility: HOSPITAL | Age: 82
Discharge: HOME | End: 2022-05-31
Attending: INTERNAL MEDICINE
Payer: MEDICARE

## 2022-05-31 ENCOUNTER — HOSPITAL ENCOUNTER (OUTPATIENT)
Dept: MAMMOGRAPHY | Facility: HOSPITAL | Age: 82
Discharge: HOME | End: 2022-05-31
Attending: INTERNAL MEDICINE
Payer: MEDICARE

## 2022-05-31 DIAGNOSIS — R92.8 ABNORMAL MAMMOGRAM: ICD-10-CM

## 2022-05-31 PROCEDURE — 77065 DX MAMMO INCL CAD UNI: CPT | Performed by: INTERNAL MEDICINE

## 2022-05-31 PROCEDURE — 77061 BREAST TOMOSYNTHESIS UNI: CPT | Performed by: INTERNAL MEDICINE

## 2022-05-31 PROCEDURE — 76642 ULTRASOUND BREAST LIMITED: CPT | Performed by: INTERNAL MEDICINE

## 2022-06-01 ENCOUNTER — TELEPHONE (OUTPATIENT)
Dept: INTERNAL MEDICINE CLINIC | Facility: CLINIC | Age: 82
End: 2022-06-01

## 2022-06-01 RX ORDER — ONDANSETRON 4 MG/1
4 TABLET, ORALLY DISINTEGRATING ORAL EVERY 8 HOURS PRN
Qty: 30 TABLET | Refills: 0 | Status: SHIPPED | OUTPATIENT
Start: 2022-06-01

## 2022-06-01 NOTE — TELEPHONE ENCOUNTER
Spoke to patient with help of CB for translation purposes. Will send Zofran - ok per MD Duran. Also utilize diet ginger ale/sprite. BRAT diet. If not better by Monday, may be squeezed in during the week to see Dr. Duran.

## 2022-06-01 NOTE — TELEPHONE ENCOUNTER
Pt called stating that on Sunday she had severe stomach pain and diarrhea  Went to [facility] and was recommended to take imodium  Pt stopped with the diarrhea but up until now the cramping/stomach pain is very bad and now feels nauseous  Pt wants to know what the doctor can recommend for her or if she needs to be seen   Please call back and advise

## 2022-08-12 ENCOUNTER — TELEPHONE (OUTPATIENT)
Dept: INTERNAL MEDICINE CLINIC | Facility: CLINIC | Age: 82
End: 2022-08-12

## 2022-08-12 DIAGNOSIS — E11.3293 CONTROLLED TYPE 2 DIABETES MELLITUS WITH BOTH EYES AFFECTED BY MILD NONPROLIFERATIVE RETINOPATHY WITHOUT MACULAR EDEMA, WITHOUT LONG-TERM CURRENT USE OF INSULIN (HCC): Primary | ICD-10-CM

## 2022-08-12 DIAGNOSIS — Z00.00 LABORATORY EXAMINATION ORDERED AS PART OF A ROUTINE GENERAL MEDICAL EXAMINATION: ICD-10-CM

## 2022-08-12 NOTE — TELEPHONE ENCOUNTER
Spouse is calling requesting blood work order before patients appt. Please call patient back to notify her, her order are ready for her and her  Mr. Damian Hall. Thank you.

## 2022-08-15 ENCOUNTER — TELEPHONE (OUTPATIENT)
Dept: INTERNAL MEDICINE CLINIC | Facility: CLINIC | Age: 82
End: 2022-08-15

## 2022-08-15 ENCOUNTER — OFFICE VISIT (OUTPATIENT)
Dept: FAMILY MEDICINE CLINIC | Facility: CLINIC | Age: 82
End: 2022-08-15
Payer: COMMERCIAL

## 2022-08-15 VITALS
BODY MASS INDEX: 21.36 KG/M2 | DIASTOLIC BLOOD PRESSURE: 55 MMHG | RESPIRATION RATE: 18 BRPM | SYSTOLIC BLOOD PRESSURE: 139 MMHG | HEIGHT: 57 IN | WEIGHT: 99 LBS | HEART RATE: 69 BPM | OXYGEN SATURATION: 94 % | TEMPERATURE: 99 F

## 2022-08-15 DIAGNOSIS — L25.9 CONTACT DERMATITIS, UNSPECIFIED CONTACT DERMATITIS TYPE, UNSPECIFIED TRIGGER: Primary | ICD-10-CM

## 2022-08-15 PROCEDURE — 3008F BODY MASS INDEX DOCD: CPT | Performed by: NURSE PRACTITIONER

## 2022-08-15 PROCEDURE — 99213 OFFICE O/P EST LOW 20 MIN: CPT | Performed by: NURSE PRACTITIONER

## 2022-08-15 PROCEDURE — 3075F SYST BP GE 130 - 139MM HG: CPT | Performed by: NURSE PRACTITIONER

## 2022-08-15 PROCEDURE — 3078F DIAST BP <80 MM HG: CPT | Performed by: NURSE PRACTITIONER

## 2022-08-15 RX ORDER — TRIAMCINOLONE ACETONIDE 1 MG/G
CREAM TOPICAL
Qty: 30 G | Refills: 0 | Status: SHIPPED | OUTPATIENT
Start: 2022-08-15

## 2022-08-15 NOTE — TELEPHONE ENCOUNTER
Pt is calling that bug bite on right wrist swollen,red and itchy since Saturday.          Please call and advise

## 2022-08-18 ENCOUNTER — NURSE ONLY (OUTPATIENT)
Dept: INTERNAL MEDICINE CLINIC | Facility: CLINIC | Age: 82
End: 2022-08-18
Payer: COMMERCIAL

## 2022-08-18 DIAGNOSIS — E11.3293 CONTROLLED TYPE 2 DIABETES MELLITUS WITH BOTH EYES AFFECTED BY MILD NONPROLIFERATIVE RETINOPATHY WITHOUT MACULAR EDEMA, WITHOUT LONG-TERM CURRENT USE OF INSULIN (HCC): ICD-10-CM

## 2022-08-18 PROCEDURE — 36415 COLL VENOUS BLD VENIPUNCTURE: CPT | Performed by: INTERNAL MEDICINE

## 2022-08-19 LAB
ABSOLUTE BASOPHILS: 40 CELLS/UL (ref 0–200)
ABSOLUTE EOSINOPHILS: 161 CELLS/UL (ref 15–500)
ABSOLUTE LYMPHOCYTES: 2466 CELLS/UL (ref 850–3900)
ABSOLUTE MONOCYTES: 637 CELLS/UL (ref 200–950)
ABSOLUTE NEUTROPHILS: 3397 CELLS/UL (ref 1500–7800)
ALBUMIN/GLOBULIN RATIO: 1.7 (CALC) (ref 1–2.5)
ALBUMIN: 4.3 G/DL (ref 3.6–5.1)
ALKALINE PHOSPHATASE: 89 U/L (ref 37–153)
ALT: 16 U/L (ref 6–29)
AST: 20 U/L (ref 10–35)
BASOPHILS: 0.6 %
BILIRUBIN, TOTAL: 0.7 MG/DL (ref 0.2–1.2)
BUN/CREATININE RATIO: 15 (CALC) (ref 6–22)
BUN: 8 MG/DL (ref 7–25)
CALCIUM: 10.1 MG/DL (ref 8.6–10.4)
CARBON DIOXIDE: 25 MMOL/L (ref 20–32)
CHLORIDE: 99 MMOL/L (ref 98–110)
CHOL/HDLC RATIO: 3.3 (CALC)
CHOLESTEROL, TOTAL: 148 MG/DL
CREATININE, RANDOM URINE: 35 MG/DL (ref 20–275)
CREATININE: 0.54 MG/DL (ref 0.6–0.95)
EGFR: 92 ML/MIN/1.73M2
EOSINOPHILS: 2.4 %
GLOBULIN: 2.6 G/DL (CALC) (ref 1.9–3.7)
GLUCOSE: 116 MG/DL (ref 65–99)
HDL CHOLESTEROL: 45 MG/DL
HEMATOCRIT: 41.3 % (ref 35–45)
HEMOGLOBIN A1C: 6.3 % OF TOTAL HGB
HEMOGLOBIN: 13.6 G/DL (ref 11.7–15.5)
LDL-CHOLESTEROL: 83 MG/DL (CALC)
LYMPHOCYTES: 36.8 %
MCH: 29 PG (ref 27–33)
MCHC: 32.9 G/DL (ref 32–36)
MCV: 88.1 FL (ref 80–100)
MICROALBUMIN/CREATININE RATIO, RANDOM URINE: 6 MCG/MG CREAT
MICROALBUMIN: 0.2 MG/DL
MONOCYTES: 9.5 %
MPV: 12.7 FL (ref 7.5–12.5)
NEUTROPHILS: 50.7 %
NON-HDL CHOLESTEROL: 103 MG/DL (CALC)
PLATELET COUNT: 217 THOUSAND/UL (ref 140–400)
POTASSIUM: 4.5 MMOL/L (ref 3.5–5.3)
PROTEIN, TOTAL: 6.9 G/DL (ref 6.1–8.1)
RDW: 12.2 % (ref 11–15)
RED BLOOD CELL COUNT: 4.69 MILLION/UL (ref 3.8–5.1)
SODIUM: 135 MMOL/L (ref 135–146)
TRIGLYCERIDES: 100 MG/DL
WHITE BLOOD CELL COUNT: 6.7 THOUSAND/UL (ref 3.8–10.8)

## 2022-08-24 ENCOUNTER — OFFICE VISIT (OUTPATIENT)
Dept: INTERNAL MEDICINE CLINIC | Facility: CLINIC | Age: 82
End: 2022-08-24
Payer: COMMERCIAL

## 2022-08-24 VITALS
OXYGEN SATURATION: 98 % | SYSTOLIC BLOOD PRESSURE: 130 MMHG | BODY MASS INDEX: 22 KG/M2 | WEIGHT: 100 LBS | TEMPERATURE: 98 F | DIASTOLIC BLOOD PRESSURE: 60 MMHG | HEART RATE: 80 BPM

## 2022-08-24 DIAGNOSIS — I10 ESSENTIAL HYPERTENSION: ICD-10-CM

## 2022-08-24 DIAGNOSIS — E78.00 HYPERCHOLESTEREMIA: ICD-10-CM

## 2022-08-24 DIAGNOSIS — Z12.11 ENCOUNTER FOR SCREENING FECAL OCCULT BLOOD TESTING: Primary | ICD-10-CM

## 2022-08-24 RX ORDER — ATORVASTATIN CALCIUM 10 MG/1
5 TABLET, FILM COATED ORAL NIGHTLY
Qty: 90 TABLET | Refills: 1 | Status: SHIPPED | OUTPATIENT
Start: 2022-08-24

## 2022-08-24 RX ORDER — ENALAPRIL MALEATE 2.5 MG/1
2.5 TABLET ORAL DAILY
Qty: 90 TABLET | Refills: 1 | Status: SHIPPED | OUTPATIENT
Start: 2022-08-24

## 2022-08-26 ENCOUNTER — NURSE ONLY (OUTPATIENT)
Dept: INTERNAL MEDICINE CLINIC | Facility: CLINIC | Age: 82
End: 2022-08-26
Payer: COMMERCIAL

## 2022-08-30 ENCOUNTER — TELEPHONE (OUTPATIENT)
Dept: INTERNAL MEDICINE CLINIC | Facility: CLINIC | Age: 82
End: 2022-08-30

## 2022-08-30 NOTE — TELEPHONE ENCOUNTER
Called patient in regards to fit test results.  was verified and results were given. Patient expressed understanding.

## 2022-08-30 NOTE — TELEPHONE ENCOUNTER
----- Message from Binta Dailey MD sent at 8/29/2022  4:07 PM CDT -----  Please inform that Fecal test is not detected. Will recommend to repeat next year. Ensure that health care maintenance is updated.

## 2023-01-04 ENCOUNTER — TELEPHONE (OUTPATIENT)
Dept: INTERNAL MEDICINE CLINIC | Facility: CLINIC | Age: 83
End: 2023-01-04

## 2023-01-26 ENCOUNTER — TELEPHONE (OUTPATIENT)
Dept: INTERNAL MEDICINE CLINIC | Facility: CLINIC | Age: 83
End: 2023-01-26

## 2023-02-01 ENCOUNTER — TELEPHONE (OUTPATIENT)
Dept: CASE MANAGEMENT | Age: 83
End: 2023-02-01

## 2023-02-01 DIAGNOSIS — E11.3293 CONTROLLED TYPE 2 DIABETES MELLITUS WITH BOTH EYES AFFECTED BY MILD NONPROLIFERATIVE RETINOPATHY WITHOUT MACULAR EDEMA, WITHOUT LONG-TERM CURRENT USE OF INSULIN (HCC): Primary | ICD-10-CM

## 2023-02-15 ENCOUNTER — TELEPHONE (OUTPATIENT)
Dept: INTERNAL MEDICINE CLINIC | Facility: CLINIC | Age: 83
End: 2023-02-15

## 2023-02-15 NOTE — TELEPHONE ENCOUNTER
called requesting for Dr Wilson Kim referral to be mailed out once its authorized  Pt has an appt on May

## 2023-02-20 NOTE — TELEPHONE ENCOUNTER
Referral was approved and sent/fax to specialist.    Copy will be handed to patient during her upcoming appt next week with Dr Marcial De Santiago.

## 2023-02-21 ENCOUNTER — NURSE ONLY (OUTPATIENT)
Dept: INTERNAL MEDICINE CLINIC | Facility: CLINIC | Age: 83
End: 2023-02-21
Payer: COMMERCIAL

## 2023-02-21 DIAGNOSIS — E78.00 HYPERCHOLESTEREMIA: Primary | ICD-10-CM

## 2023-02-21 DIAGNOSIS — E11.3293 CONTROLLED TYPE 2 DIABETES MELLITUS WITH BOTH EYES AFFECTED BY MILD NONPROLIFERATIVE RETINOPATHY WITHOUT MACULAR EDEMA, WITHOUT LONG-TERM CURRENT USE OF INSULIN (HCC): ICD-10-CM

## 2023-02-21 PROCEDURE — 36415 COLL VENOUS BLD VENIPUNCTURE: CPT | Performed by: INTERNAL MEDICINE

## 2023-02-22 LAB
ALBUMIN/GLOBULIN RATIO: 1.7 (CALC) (ref 1–2.5)
ALBUMIN: 4.5 G/DL (ref 3.6–5.1)
ALKALINE PHOSPHATASE: 107 U/L (ref 37–153)
ALT: 13 U/L (ref 6–29)
AST: 22 U/L (ref 10–35)
BILIRUBIN, TOTAL: 0.6 MG/DL (ref 0.2–1.2)
BUN/CREATININE RATIO: 16 (CALC) (ref 6–22)
BUN: 8 MG/DL (ref 7–25)
CALCIUM: 10.1 MG/DL (ref 8.6–10.4)
CARBON DIOXIDE: 25 MMOL/L (ref 20–32)
CHLORIDE: 100 MMOL/L (ref 98–110)
CHOL/HDLC RATIO: 2.6 (CALC)
CHOLESTEROL, TOTAL: 137 MG/DL
CREATININE: 0.5 MG/DL (ref 0.6–0.95)
EGFR: 94 ML/MIN/1.73M2
GLOBULIN: 2.7 G/DL (CALC) (ref 1.9–3.7)
GLUCOSE: 120 MG/DL (ref 65–99)
HDL CHOLESTEROL: 53 MG/DL
HEMOGLOBIN A1C: 6.1 % OF TOTAL HGB
LDL-CHOLESTEROL: 70 MG/DL (CALC)
NON-HDL CHOLESTEROL: 84 MG/DL (CALC)
POTASSIUM: 4.6 MMOL/L (ref 3.5–5.3)
PROTEIN, TOTAL: 7.2 G/DL (ref 6.1–8.1)
SODIUM: 135 MMOL/L (ref 135–146)
TRIGLYCERIDES: 65 MG/DL

## 2023-02-27 ENCOUNTER — OFFICE VISIT (OUTPATIENT)
Dept: INTERNAL MEDICINE CLINIC | Facility: CLINIC | Age: 83
End: 2023-02-27
Payer: COMMERCIAL

## 2023-02-27 VITALS
OXYGEN SATURATION: 96 % | DIASTOLIC BLOOD PRESSURE: 64 MMHG | WEIGHT: 97 LBS | SYSTOLIC BLOOD PRESSURE: 106 MMHG | TEMPERATURE: 98 F | BODY MASS INDEX: 21 KG/M2 | HEART RATE: 88 BPM

## 2023-02-27 DIAGNOSIS — E11.3293 CONTROLLED TYPE 2 DIABETES MELLITUS WITH BOTH EYES AFFECTED BY MILD NONPROLIFERATIVE RETINOPATHY WITHOUT MACULAR EDEMA, WITHOUT LONG-TERM CURRENT USE OF INSULIN (HCC): ICD-10-CM

## 2023-02-27 DIAGNOSIS — E78.00 HYPERCHOLESTEREMIA: Primary | ICD-10-CM

## 2023-02-27 DIAGNOSIS — I10 ESSENTIAL HYPERTENSION: ICD-10-CM

## 2023-02-27 PROCEDURE — 3074F SYST BP LT 130 MM HG: CPT | Performed by: INTERNAL MEDICINE

## 2023-02-27 PROCEDURE — 3078F DIAST BP <80 MM HG: CPT | Performed by: INTERNAL MEDICINE

## 2023-02-27 PROCEDURE — 99214 OFFICE O/P EST MOD 30 MIN: CPT | Performed by: INTERNAL MEDICINE

## 2023-02-27 RX ORDER — ENALAPRIL MALEATE 2.5 MG/1
2.5 TABLET ORAL DAILY
Qty: 90 TABLET | Refills: 1 | Status: SHIPPED | OUTPATIENT
Start: 2023-02-27

## 2023-02-27 RX ORDER — ATORVASTATIN CALCIUM 10 MG/1
5 TABLET, FILM COATED ORAL NIGHTLY
Qty: 90 TABLET | Refills: 1 | Status: SHIPPED | OUTPATIENT
Start: 2023-02-27

## 2023-05-16 ENCOUNTER — TELEPHONE (OUTPATIENT)
Dept: INTERNAL MEDICINE CLINIC | Facility: CLINIC | Age: 83
End: 2023-05-16

## 2023-05-16 DIAGNOSIS — Z12.31 SCREENING MAMMOGRAM FOR BREAST CANCER: Primary | ICD-10-CM

## 2023-05-16 NOTE — TELEPHONE ENCOUNTER
Spouse is requesting a mamm order and additional visits for  Kp Krause MD. Also Dr. Alessandra Brothers refer this patient to see Dr. Yamil Zendejas.

## 2023-05-16 NOTE — TELEPHONE ENCOUNTER
mammo order placed. I tried calling patient to find out more about the reason on why she needs to see Dr Ned Strange. Awaiting on notes from Dr Flaquita Rodriguez. Referral to see Dr Erika Roche is valid for 6 visits, starting 02/11/2023 and Exp 08/10/2023 but her appt is 11/16/2023 to see him again and will require a new one.

## 2023-05-22 ENCOUNTER — TELEPHONE (OUTPATIENT)
Dept: INTERNAL MEDICINE CLINIC | Facility: CLINIC | Age: 83
End: 2023-05-22

## 2023-05-22 DIAGNOSIS — Z01.00 ENCOUNTER FOR DIABETES TYPE 2 EYE EXAM (HCC): ICD-10-CM

## 2023-05-22 DIAGNOSIS — H40.009 GLAUCOMA SUSPECT, UNSPECIFIED LATERALITY: Primary | ICD-10-CM

## 2023-05-22 DIAGNOSIS — E11.9 ENCOUNTER FOR DIABETES TYPE 2 EYE EXAM (HCC): ICD-10-CM

## 2023-05-22 NOTE — TELEPHONE ENCOUNTER
Referral entered to see Dr Andrey Schwartz, awaiting on approval form her insurance      Referral to see Dr Alessandra Brothers was also entered for her visit in November with him.

## 2023-05-22 NOTE — TELEPHONE ENCOUNTER
Pt called requesting a referral for ophthalmology Dr Aminah Aquino, for Retina issues  Pt would like to be informed when referral is ready

## 2023-05-25 ENCOUNTER — TELEPHONE (OUTPATIENT)
Dept: INTERNAL MEDICINE CLINIC | Facility: CLINIC | Age: 83
End: 2023-05-25

## 2023-05-25 NOTE — TELEPHONE ENCOUNTER
Spouse is calling in regards to patients referral to see Dr. Toro Quiñones. Dr. Karie Isbell office states they haven't received the referral. Please call and advise.

## 2023-06-01 ENCOUNTER — TELEPHONE (OUTPATIENT)
Dept: INTERNAL MEDICINE CLINIC | Facility: CLINIC | Age: 83
End: 2023-06-01

## 2023-06-01 DIAGNOSIS — Z01.818 PRE-OP EVALUATION: Primary | ICD-10-CM

## 2023-06-05 ENCOUNTER — LAB ENCOUNTER (OUTPATIENT)
Dept: LAB | Facility: HOSPITAL | Age: 83
End: 2023-06-05
Attending: INTERNAL MEDICINE
Payer: MEDICARE

## 2023-06-05 ENCOUNTER — LAB ENCOUNTER (OUTPATIENT)
Dept: LAB | Facility: REFERENCE LAB | Age: 83
End: 2023-06-05
Attending: INTERNAL MEDICINE
Payer: MEDICARE

## 2023-06-05 DIAGNOSIS — Z01.818 PRE-OP EVALUATION: ICD-10-CM

## 2023-06-05 LAB
ALBUMIN SERPL-MCNC: 4.1 G/DL (ref 3.4–5)
ALBUMIN/GLOB SERPL: 1.1 {RATIO} (ref 1–2)
ALP LIVER SERPL-CCNC: 130 U/L
ALT SERPL-CCNC: 27 U/L
ANION GAP SERPL CALC-SCNC: 7 MMOL/L (ref 0–18)
AST SERPL-CCNC: 22 U/L (ref 15–37)
ATRIAL RATE: 86 BPM
BASOPHILS # BLD AUTO: 0.05 X10(3) UL (ref 0–0.2)
BASOPHILS NFR BLD AUTO: 0.6 %
BILIRUB SERPL-MCNC: 0.5 MG/DL (ref 0.1–2)
BUN BLD-MCNC: 11 MG/DL (ref 7–18)
BUN/CREAT SERPL: 17.7 (ref 10–20)
CALCIUM BLD-MCNC: 9.4 MG/DL (ref 8.5–10.1)
CHLORIDE SERPL-SCNC: 102 MMOL/L (ref 98–112)
CO2 SERPL-SCNC: 28 MMOL/L (ref 21–32)
CREAT BLD-MCNC: 0.62 MG/DL
DEPRECATED RDW RBC AUTO: 40.1 FL (ref 35.1–46.3)
EOSINOPHIL # BLD AUTO: 0.25 X10(3) UL (ref 0–0.7)
EOSINOPHIL NFR BLD AUTO: 3.2 %
ERYTHROCYTE [DISTWIDTH] IN BLOOD BY AUTOMATED COUNT: 12.4 % (ref 11–15)
FASTING STATUS PATIENT QL REPORTED: NO
GFR SERPLBLD BASED ON 1.73 SQ M-ARVRAT: 89 ML/MIN/1.73M2 (ref 60–?)
GLOBULIN PLAS-MCNC: 3.6 G/DL (ref 2.8–4.4)
GLUCOSE BLD-MCNC: 129 MG/DL (ref 70–99)
HCT VFR BLD AUTO: 40.3 %
HGB BLD-MCNC: 13.6 G/DL
IMM GRANULOCYTES # BLD AUTO: 0.03 X10(3) UL (ref 0–1)
IMM GRANULOCYTES NFR BLD: 0.4 %
INR BLD: 0.98 (ref 0.85–1.16)
LYMPHOCYTES # BLD AUTO: 3.21 X10(3) UL (ref 1–4)
LYMPHOCYTES NFR BLD AUTO: 41.3 %
MCH RBC QN AUTO: 29.7 PG (ref 26–34)
MCHC RBC AUTO-ENTMCNC: 33.7 G/DL (ref 31–37)
MCV RBC AUTO: 88 FL
MONOCYTES # BLD AUTO: 0.62 X10(3) UL (ref 0.1–1)
MONOCYTES NFR BLD AUTO: 8 %
NEUTROPHILS # BLD AUTO: 3.61 X10 (3) UL (ref 1.5–7.7)
NEUTROPHILS # BLD AUTO: 3.61 X10(3) UL (ref 1.5–7.7)
NEUTROPHILS NFR BLD AUTO: 46.5 %
OSMOLALITY SERPL CALC.SUM OF ELEC: 285 MOSM/KG (ref 275–295)
P AXIS: 46 DEGREES
P-R INTERVAL: 152 MS
PLATELET # BLD AUTO: 236 10(3)UL (ref 150–450)
POTASSIUM SERPL-SCNC: 4.3 MMOL/L (ref 3.5–5.1)
PROT SERPL-MCNC: 7.7 G/DL (ref 6.4–8.2)
PROTHROMBIN TIME: 12.9 SECONDS (ref 11.6–14.8)
Q-T INTERVAL: 370 MS
QRS DURATION: 74 MS
QTC CALCULATION (BEZET): 442 MS
R AXIS: 41 DEGREES
RBC # BLD AUTO: 4.58 X10(6)UL
SODIUM SERPL-SCNC: 137 MMOL/L (ref 136–145)
T AXIS: 60 DEGREES
VENTRICULAR RATE: 86 BPM
WBC # BLD AUTO: 7.8 X10(3) UL (ref 4–11)

## 2023-06-05 PROCEDURE — 93005 ELECTROCARDIOGRAM TRACING: CPT

## 2023-06-05 PROCEDURE — 85610 PROTHROMBIN TIME: CPT | Performed by: INTERNAL MEDICINE

## 2023-06-05 PROCEDURE — 93010 ELECTROCARDIOGRAM REPORT: CPT | Performed by: INTERNAL MEDICINE

## 2023-06-05 PROCEDURE — 85025 COMPLETE CBC W/AUTO DIFF WBC: CPT | Performed by: INTERNAL MEDICINE

## 2023-06-05 PROCEDURE — 36415 COLL VENOUS BLD VENIPUNCTURE: CPT | Performed by: INTERNAL MEDICINE

## 2023-06-05 PROCEDURE — 80053 COMPREHEN METABOLIC PANEL: CPT | Performed by: INTERNAL MEDICINE

## 2023-06-12 ENCOUNTER — OFFICE VISIT (OUTPATIENT)
Dept: INTERNAL MEDICINE CLINIC | Facility: CLINIC | Age: 83
End: 2023-06-12
Payer: COMMERCIAL

## 2023-06-12 VITALS
DIASTOLIC BLOOD PRESSURE: 60 MMHG | SYSTOLIC BLOOD PRESSURE: 100 MMHG | WEIGHT: 99 LBS | TEMPERATURE: 99 F | BODY MASS INDEX: 21 KG/M2 | HEART RATE: 71 BPM | OXYGEN SATURATION: 100 %

## 2023-06-12 DIAGNOSIS — Z01.818 PREOP EXAMINATION: Primary | ICD-10-CM

## 2023-06-12 RX ORDER — GLUCOSAMINE SULFATE DIPOT CHLR 1000 MG
1 TABLET ORAL DAILY
Qty: 90 TABLET | Refills: 0 | Status: SHIPPED | OUTPATIENT
Start: 2023-06-12

## 2023-06-21 ENCOUNTER — TELEPHONE (OUTPATIENT)
Dept: INTERNAL MEDICINE CLINIC | Facility: CLINIC | Age: 83
End: 2023-06-21

## 2023-06-21 DIAGNOSIS — I10 ESSENTIAL HYPERTENSION: ICD-10-CM

## 2023-06-21 DIAGNOSIS — E78.00 HYPERCHOLESTEREMIA: ICD-10-CM

## 2023-06-21 NOTE — TELEPHONE ENCOUNTER
Spouse of pt is calling requesting refill on atorvastatin and enalapril.       Please call and advise

## 2023-06-21 NOTE — TELEPHONE ENCOUNTER
Spouse of pt is calling back stating that called [facility] to asks about b complex and pharmacy mention that they have not sent out because had a few questions for doctor. Pt mention that pharmacy sent out questions and that they have not received an answer.

## 2023-06-23 RX ORDER — ENALAPRIL MALEATE 2.5 MG/1
2.5 TABLET ORAL DAILY
Qty: 90 TABLET | Refills: 1 | Status: SHIPPED | OUTPATIENT
Start: 2023-06-23

## 2023-06-23 RX ORDER — ATORVASTATIN CALCIUM 10 MG/1
5 TABLET, FILM COATED ORAL NIGHTLY
Qty: 45 TABLET | Refills: 1 | Status: SHIPPED | OUTPATIENT
Start: 2023-06-23

## 2023-06-24 ENCOUNTER — HOSPITAL ENCOUNTER (OUTPATIENT)
Dept: MAMMOGRAPHY | Facility: HOSPITAL | Age: 83
Discharge: HOME | End: 2023-06-24
Attending: INTERNAL MEDICINE
Payer: MEDICARE

## 2023-06-24 DIAGNOSIS — Z12.31 SCREENING MAMMOGRAM FOR BREAST CANCER: ICD-10-CM

## 2023-06-24 PROCEDURE — 77067 SCR MAMMO BI INCL CAD: CPT | Performed by: INTERNAL MEDICINE

## 2023-06-24 PROCEDURE — 77063 BREAST TOMOSYNTHESIS BI: CPT | Performed by: INTERNAL MEDICINE

## 2023-06-28 ENCOUNTER — HOSPITAL ENCOUNTER (OUTPATIENT)
Dept: ULTRASOUND IMAGING | Facility: HOSPITAL | Age: 83
Discharge: HOME | End: 2023-06-28
Attending: INTERNAL MEDICINE
Payer: MEDICARE

## 2023-06-28 ENCOUNTER — HOSPITAL ENCOUNTER (OUTPATIENT)
Dept: MAMMOGRAPHY | Facility: HOSPITAL | Age: 83
Discharge: HOME | End: 2023-06-28
Attending: INTERNAL MEDICINE
Payer: MEDICARE

## 2023-06-28 DIAGNOSIS — R92.8 ABNORMAL MAMMOGRAM: ICD-10-CM

## 2023-06-28 PROCEDURE — 77065 DX MAMMO INCL CAD UNI: CPT | Performed by: INTERNAL MEDICINE

## 2023-06-28 PROCEDURE — 76642 ULTRASOUND BREAST LIMITED: CPT | Performed by: INTERNAL MEDICINE

## 2023-06-28 PROCEDURE — 77061 BREAST TOMOSYNTHESIS UNI: CPT | Performed by: INTERNAL MEDICINE

## 2023-06-29 NOTE — DISCHARGE INSTRUCTIONS
The Doctor (Radiologist) who performed your procedure was: DR Rita Gnozalez an ice pack over the biopsy site on top of your bra or on top of the ACE wrap (never apply ice directly over skin) for 10-15 minutes of every hour until bedtime for your comfort and to decrease bleeding. Keep your sports bra or the ACE wrap (stereotactic and MRI biopsy) in place for 24 hours after your biopsy. This compression decreases bleeding and breast movement for your comfort. Wear a supportive bra for the next couple of days for comfort (sports bra for sleep). Continue to wear, preferably, a sports bra or good supportive bra for 1 week and take off only to shower. No baths or showers during the first 24 hours after biopsy. After this time you may take a shower. It's okay if the strips get wet but do not soak them. NO saunas, hot tubs or swimming until steri-strips fall off (approx. 5 days). This prevents infection and allows time for them to completely close and heal.  DO NOT remove the steri-strips. They will fall off in 5 days. If any type of irritation (redness, itching or blisters) develops in the area around the steri-strips, remove them gently. If the steri-strips do not fall off after 5 days, gently remove them. Keep the area clean and dry. It is normal to have mild discomfort and bruising at the biopsy site. You may take Tylenol as needed for discomfort, as long as you have no allergies to Tylenol. Do not take aspirin, motrin, ibuprofen or any medication containing NSAID (non-steroidal anti-inflammatory drug) product for 48 hours. DO NOT participate in strenuous activity (aerobics, heavy lifting, housework, gardening, etc.) 48 hours after your biopsy to prevent bleeding. You will receive results in 2-3 business days. If you are having an MRI breast biopsy or an Ultrasound guided breast biopsy, you will be billed for the biopsy and unilateral mammogram separately.   If you have any questions about the procedure or your results, please contact the Breast Care Coordinator Nurse at [phone]. Notify your ordering physician or primary physician for increased bleeding, pain or fever over 100. Or contact a Radiology Nurse at [phone] between 8am-4pm (after 4pm, your call will be directed to the [facility]).

## 2023-07-05 ENCOUNTER — HOSPITAL ENCOUNTER (OUTPATIENT)
Dept: MAMMOGRAPHY | Facility: HOSPITAL | Age: 83
Discharge: HOME | End: 2023-07-05
Attending: INTERNAL MEDICINE
Payer: MEDICARE

## 2023-07-05 DIAGNOSIS — R92.1 BREAST CALCIFICATIONS: ICD-10-CM

## 2023-07-05 PROCEDURE — 19081 BX BREAST 1ST LESION STRTCTC: CPT | Performed by: INTERNAL MEDICINE

## 2023-07-05 PROCEDURE — 88305 TISSUE EXAM BY PATHOLOGIST: CPT | Performed by: INTERNAL MEDICINE

## 2023-07-05 NOTE — IMAGING NOTE
History taken and as follows: CONCLUSION:     1. Grouped calcifications in the upper outer left breast posteriorly are indeterminate. Recommend further evaluation with tomosynthesis-guided core needle biopsy of the left breast.      2. Asymmetry in the subareolar left breast is characterized as a benign chronically inverted nipple. These findings and recommendations were discussed with the patient in person at the time the examination. BI-RADS CATEGORY:     DIAGNOSTIC CATEGORY 4--SUSPICIOUS       RECOMMENDATIONS:   STEREOTACTIC BIOPSY WITH 3D/STEPHANIE LEFT BREAST x 1      Dictated by (CST): Soniya Fernando MD on 6/28/2023 at 10:02 AM       Finalized by (CST): Soniya Fernando MD on 6/28/2023 at 11:04 AM       [id number] Pt consented at  [facility] Dr. Solitario Blankenship here to explain risk and benefits of procedure. Questions answered by the physcian    Placed in chair  at  [address] taken    0906 Time out taken    0912 Chloro prep as skin prep. Lidocaine 1% 10  Milligrams per ml 5 ml given for superficial anesthetic affect      0913 Lidocaine  1% with epinephrine  1:100,000 units for deeper anesthetic affect 15 ML given. More images taken after local  was given. Sampling begins at 0914    Sampling complete at  [address] taken to be imaged. 0919 Formalin added to samples. 2436   TOPHAT clip inserted . Procedure completed . Pressure to site manually by nurse  and by machine compression for 10 minutes . No active bleeding noted. Area cleaned steri strips to site . Ice pack to site . 1258 Post instructions  Given verbal et written AVS  summary sheet provided to patient. Patient verbalizes understanding and agreement. 7941 Cores taken to pathology by Lisa Marin tech aid.    0930  post clip images  completed  Dressing dry and intact . Nipple markers removed by Brynda Roles tech. Bra applied per patient. 6\" ace secured over bra by Arelis Callahan.  Patient to be discharged once post imaging completed.

## 2023-07-10 ENCOUNTER — TELEPHONE (OUTPATIENT)
Dept: ULTRASOUND IMAGING | Facility: HOSPITAL | Age: 83
End: 2023-07-10

## 2023-07-10 NOTE — TELEPHONE ENCOUNTER
Nell Romberg is s/p biopsy . Phoned utilized language line [phone]  client id # [id number] and introduced myself as breast coordinator . Reinforced to patient  post biopsy care and instructions . No  c/o post procedure     Informed  and shared the pathology results as well as the recommendations from Dr Javi Rodríguez for her breast imaging  as follows:      Pathology results shared (see epic for dictated pathology and radiology procedure report)  and recommendations are as follows:          Recommendations  Short-term six month interval follow-up left breast mammogram is recommended to ensure stability. Emmanuelle Parmar the above and denies questions. Nell Romberg was also instructed to perform breast self exams and if any changes  develops any changes to contact ordering  physician immediately  for re evaluation. .  Bria Nassar understanding and agreement.

## 2023-07-17 DIAGNOSIS — R92.8 ABNORMAL MAMMOGRAM: Primary | ICD-10-CM

## 2023-08-07 ENCOUNTER — OFFICE VISIT (OUTPATIENT)
Dept: INTERNAL MEDICINE CLINIC | Facility: CLINIC | Age: 83
End: 2023-08-07
Payer: COMMERCIAL

## 2023-08-07 VITALS
SYSTOLIC BLOOD PRESSURE: 118 MMHG | OXYGEN SATURATION: 98 % | WEIGHT: 101 LBS | HEART RATE: 86 BPM | BODY MASS INDEX: 22 KG/M2 | DIASTOLIC BLOOD PRESSURE: 70 MMHG

## 2023-08-07 DIAGNOSIS — M25.512 ACUTE PAIN OF LEFT SHOULDER: Primary | ICD-10-CM

## 2023-08-07 PROCEDURE — 99214 OFFICE O/P EST MOD 30 MIN: CPT | Performed by: INTERNAL MEDICINE

## 2023-08-07 PROCEDURE — 3074F SYST BP LT 130 MM HG: CPT | Performed by: INTERNAL MEDICINE

## 2023-08-07 PROCEDURE — 3078F DIAST BP <80 MM HG: CPT | Performed by: INTERNAL MEDICINE

## 2023-08-07 PROCEDURE — 1160F RVW MEDS BY RX/DR IN RCRD: CPT | Performed by: INTERNAL MEDICINE

## 2023-08-07 PROCEDURE — 1159F MED LIST DOCD IN RCRD: CPT | Performed by: INTERNAL MEDICINE

## 2023-08-07 RX ORDER — NAPROXEN 500 MG/1
500 TABLET ORAL 2 TIMES DAILY PRN
Qty: 60 TABLET | Refills: 0 | Status: SHIPPED | OUTPATIENT
Start: 2023-08-07

## 2023-08-07 RX ORDER — FAMOTIDINE 20 MG/1
20 TABLET, FILM COATED ORAL DAILY
Qty: 30 TABLET | Refills: 1 | Status: SHIPPED | OUTPATIENT
Start: 2023-08-07

## 2023-08-15 ENCOUNTER — TELEPHONE (OUTPATIENT)
Dept: INTERNAL MEDICINE CLINIC | Facility: CLINIC | Age: 83
End: 2023-08-15

## 2023-08-15 NOTE — TELEPHONE ENCOUNTER
#[id number]     Patient advised to continue current medications. Advised if any new or worsening symptoms to call office or go to Urgent Care for evaluation.

## 2023-08-15 NOTE — TELEPHONE ENCOUNTER
[phone] to patient. Patient is calling to give Dr. Gabriela Street an update since taking Naproxen 500 mg 2 tabs prn  Patient states that it took a few days to help but now the patient states her left shoulder is feeling much better. Patient states her stomach is feeling better without taking the famotidine 20mg daily. Patient states she just found out the famotidine was ready at the pharmacy and will  and start medication. Please advise anything additional.  Thank you.

## 2023-08-15 NOTE — TELEPHONE ENCOUNTER
Pt is calling stating that doctor had told her to call in a week to let him know if the medication is working. Pt states that medication is working first couple of days she was not able to sleep but know she is. Pt also mention that he had prescribe her famotidine but pt states she has not been having any stomach issues and wants to know if doctor still would like her to take it.       Please advise

## 2023-08-21 ENCOUNTER — TELEPHONE (OUTPATIENT)
Dept: INTERNAL MEDICINE CLINIC | Facility: CLINIC | Age: 83
End: 2023-08-21

## 2023-08-21 DIAGNOSIS — E11.3293 CONTROLLED TYPE 2 DIABETES MELLITUS WITH BOTH EYES AFFECTED BY MILD NONPROLIFERATIVE RETINOPATHY WITHOUT MACULAR EDEMA, WITHOUT LONG-TERM CURRENT USE OF INSULIN (HCC): Primary | ICD-10-CM

## 2023-08-28 ENCOUNTER — OFFICE VISIT (OUTPATIENT)
Dept: INTERNAL MEDICINE CLINIC | Facility: CLINIC | Age: 83
End: 2023-08-28
Payer: COMMERCIAL

## 2023-08-28 ENCOUNTER — HOSPITAL ENCOUNTER (OUTPATIENT)
Dept: GENERAL RADIOLOGY | Facility: HOSPITAL | Age: 83
Discharge: HOME | End: 2023-08-28
Attending: INTERNAL MEDICINE
Payer: MEDICARE

## 2023-08-28 VITALS
DIASTOLIC BLOOD PRESSURE: 60 MMHG | TEMPERATURE: 98 F | BODY MASS INDEX: 21.88 KG/M2 | SYSTOLIC BLOOD PRESSURE: 100 MMHG | OXYGEN SATURATION: 97 % | HEART RATE: 72 BPM | HEIGHT: 56.5 IN | WEIGHT: 100 LBS

## 2023-08-28 DIAGNOSIS — Z12.11 ENCOUNTER FOR SCREENING FECAL OCCULT BLOOD TESTING: ICD-10-CM

## 2023-08-28 DIAGNOSIS — M25.511 CHRONIC PAIN OF BOTH SHOULDERS: ICD-10-CM

## 2023-08-28 DIAGNOSIS — M25.512 CHRONIC PAIN OF BOTH SHOULDERS: ICD-10-CM

## 2023-08-28 DIAGNOSIS — Z00.00 MEDICARE ANNUAL WELLNESS VISIT, SUBSEQUENT: Primary | ICD-10-CM

## 2023-08-28 DIAGNOSIS — E78.00 HYPERCHOLESTEREMIA: ICD-10-CM

## 2023-08-28 DIAGNOSIS — Z96.1 PSEUDOPHAKIA OF BOTH EYES: ICD-10-CM

## 2023-08-28 DIAGNOSIS — Z90.11 STATUS POST RIGHT MASTECTOMY: ICD-10-CM

## 2023-08-28 DIAGNOSIS — H35.372 EPIRETINAL MEMBRANE (ERM) OF LEFT EYE: ICD-10-CM

## 2023-08-28 DIAGNOSIS — M25.512 CHRONIC LEFT SHOULDER PAIN: ICD-10-CM

## 2023-08-28 DIAGNOSIS — I10 ESSENTIAL HYPERTENSION: ICD-10-CM

## 2023-08-28 DIAGNOSIS — M85.89 OSTEOPENIA OF MULTIPLE SITES: ICD-10-CM

## 2023-08-28 DIAGNOSIS — H40.003 GLAUCOMA SUSPECT OF BOTH EYES: ICD-10-CM

## 2023-08-28 DIAGNOSIS — Z85.3 PERSONAL HISTORY OF BREAST CANCER: ICD-10-CM

## 2023-08-28 DIAGNOSIS — E11.3293 CONTROLLED TYPE 2 DIABETES MELLITUS WITH BOTH EYES AFFECTED BY MILD NONPROLIFERATIVE RETINOPATHY WITHOUT MACULAR EDEMA, WITHOUT LONG-TERM CURRENT USE OF INSULIN (HCC): ICD-10-CM

## 2023-08-28 DIAGNOSIS — G89.29 CHRONIC LEFT SHOULDER PAIN: ICD-10-CM

## 2023-08-28 DIAGNOSIS — K57.30 DIVERTICULOSIS OF COLON: ICD-10-CM

## 2023-08-28 DIAGNOSIS — Z00.00 ENCOUNTER FOR ANNUAL HEALTH EXAMINATION: ICD-10-CM

## 2023-08-28 DIAGNOSIS — Z85.828 HISTORY OF BASAL CELL CARCINOMA: ICD-10-CM

## 2023-08-28 DIAGNOSIS — G89.29 CHRONIC PAIN OF BOTH SHOULDERS: ICD-10-CM

## 2023-08-28 PROBLEM — K58.8 OTHER IRRITABLE BOWEL SYNDROME: Status: RESOLVED | Noted: 2017-11-28 | Resolved: 2023-08-28

## 2023-08-28 PROBLEM — Q43.8 TORTUOUS COLON: Status: RESOLVED | Noted: 2017-11-28 | Resolved: 2023-08-28

## 2023-08-28 LAB
CARTRIDGE LOT#: 611 NUMERIC
CREAT UR-SCNC: <13 MG/DL
HEMOGLOBIN A1C: 6.1 % (ref 4.3–5.6)
MICROALBUMIN UR-MCNC: 0.62 MG/DL

## 2023-08-28 PROCEDURE — 82043 UR ALBUMIN QUANTITATIVE: CPT | Performed by: INTERNAL MEDICINE

## 2023-08-28 PROCEDURE — 82570 ASSAY OF URINE CREATININE: CPT | Performed by: INTERNAL MEDICINE

## 2023-08-28 PROCEDURE — 73030 X-RAY EXAM OF SHOULDER: CPT | Performed by: INTERNAL MEDICINE

## 2023-09-06 ENCOUNTER — TELEPHONE (OUTPATIENT)
Dept: INTERNAL MEDICINE CLINIC | Facility: CLINIC | Age: 83
End: 2023-09-06

## 2023-09-06 NOTE — TELEPHONE ENCOUNTER
Patient physically came in asking about something else and results were discussed, recommendations were made but prefers not to see physiatry at the moment, urine results dicussed as well.        ----- Message from Pa Rivera MD sent at 9/5/2023 10:05 AM CDT -----  Please inform patient that her shoulder x-ray has been reviewed. She has some mild left shoulder arthropathy. Physical therapy recommended with continuing treatment with NSAIDs and acetaminophen. I discussed possible referral to physiatry however she did not want to see them at the moment because she does not want to consider injection.   Please let her know that her urine microalbumin creatinine ratio is also normal.

## 2023-09-07 ENCOUNTER — NURSE ONLY (OUTPATIENT)
Dept: INTERNAL MEDICINE CLINIC | Facility: CLINIC | Age: 83
End: 2023-09-07
Payer: COMMERCIAL

## 2023-09-07 LAB — HEMOCCULT STL QL: NEGATIVE

## 2023-09-07 PROCEDURE — 82274 ASSAY TEST FOR BLOOD FECAL: CPT | Performed by: INTERNAL MEDICINE

## 2023-09-28 ENCOUNTER — TELEPHONE (OUTPATIENT)
Dept: INTERNAL MEDICINE CLINIC | Facility: CLINIC | Age: 83
End: 2023-09-28

## 2023-09-28 NOTE — TELEPHONE ENCOUNTER
Patient was contacted and results given to her .        ----- Message from Anna Casey MD sent at 9/27/2023  9:53 AM CDT -----  Fecal occult test negative.   Please inform

## 2023-10-31 ENCOUNTER — TELEPHONE (OUTPATIENT)
Dept: INTERNAL MEDICINE CLINIC | Facility: CLINIC | Age: 83
End: 2023-10-31

## 2023-10-31 DIAGNOSIS — E78.00 HYPERCHOLESTEREMIA: ICD-10-CM

## 2023-10-31 DIAGNOSIS — I10 ESSENTIAL HYPERTENSION: ICD-10-CM

## 2023-10-31 RX ORDER — ATORVASTATIN CALCIUM 10 MG/1
5 TABLET, FILM COATED ORAL NIGHTLY
Qty: 45 TABLET | Refills: 1 | Status: SHIPPED | OUTPATIENT
Start: 2023-10-31

## 2023-10-31 RX ORDER — ENALAPRIL MALEATE 2.5 MG/1
2.5 TABLET ORAL DAILY
Qty: 90 TABLET | Refills: 1 | Status: SHIPPED | OUTPATIENT
Start: 2023-10-31

## 2023-10-31 NOTE — TELEPHONE ENCOUNTER
Pt called requesting refills for her bp medication and cholesterol medication  To please inform her when sent

## 2024-01-11 ENCOUNTER — HOSPITAL ENCOUNTER (OUTPATIENT)
Dept: MAMMOGRAPHY | Facility: HOSPITAL | Age: 84
Discharge: HOME | End: 2024-01-11
Attending: INTERNAL MEDICINE
Payer: MEDICARE

## 2024-01-11 DIAGNOSIS — R92.8 ABNORMAL MAMMOGRAM: ICD-10-CM

## 2024-01-11 PROCEDURE — 77061 BREAST TOMOSYNTHESIS UNI: CPT | Performed by: INTERNAL MEDICINE

## 2024-01-11 PROCEDURE — 77065 DX MAMMO INCL CAD UNI: CPT | Performed by: INTERNAL MEDICINE

## 2024-01-11 NOTE — IMAGING NOTE
Discussed recommended breast biopsy with patient.  Patient is being recommended for stereotactic biopsy of the LEFT Breast  by Dr. LESLIE  Pt history discussed as below:  Patient has left arm pain and limitations. Will need full hour. DO NOT BOOK 30 MINUTE INCREMENT CASES BEFORE OF AFTER THIS BX.  used [facility]ili ID# [id number]. Patient provided Wednesday 1/17 9am check in 815am.   Pt history of biopsy: Yes left breast benign in 2023. Right breast with mastectomy in 2001      Family history of cancer:   no  Pt history of breast cancer:  yes; right breast with mastectomy in 2001.   Hx BCP use:      no              HRT use:    no    Recommedations :       STEREOTACTIC GUIDED LEFT BREAST BIOPSY               see [facility] for dictated radiology report     Reviewed pertinent patient history, family history of cancer, and patient medications.    Current Outpatient Medications   Medication Sig Dispense Refill    atorvastatin 10 MG Oral Tab Take 0.5 tablets (5 mg total) by mouth nightly. 45 tablet 1    enalapril 2.5 MG Oral Tab Take 1 tablet (2.5 mg total) by mouth daily. 90 tablet 1    naproxen 500 MG Oral Tab Take 1 tablet (500 mg total) by mouth 2 (two) times daily as needed. 60 tablet 0    famotidine 20 MG Oral Tab Take 1 tablet (20 mg total) by mouth daily. 30 tablet 1    B Complex-Biotin-FA (COMPLEX B-100) Oral Tab CR Take 1 tablet by mouth daily. 90 tablet 0    diclofenac 1 % External Gel Apply 2 g topically 4 (four) times daily. (Patient not taking: Reported on 8/7/2023) 1 each 1       Discussed with patient Radiology’s protocol regarding medications, as well as over-the-counter vitamin or herbal supplements, as follows:   -All herbal supplements, Vitamin E, Fish Oil    -All NSAIDs (Ibuprofen, Motrin, Advil, Aleve, or other antiinflammatory medication)  and Aspirin  81mg currently being taken    not recommended by  your physician on should be held for 5  days prior to biopsy.  -Aspirin 81 mg being taken  related to a cardiac condition  or prescribed by your  physician should be held at the  direction of your physician.  Radiology's preference is to hold this medication for  5 days prior to biopsy.    PATIENT DENIES CURRENT USAGE IF NSAIDS (NO NAPROXEN)   -Blood thinners/antiplatelet medications (Coumadin, Plavix ect) should be held at the  direction of your physician.  Radiology's preference is to hold these medications for 5  days prior to biopsy.    PATIENT DENIES USAGE   Reviewed Stereotactic biopsy procedure, as below.  For this procedure,   stereotactic biopsy is being performed with tomosynthesis , you will sitting upright  with your breast in compression.  Mammography imaging will be used to locate the area in question that was seen on your previous breast imaging.  The Radiologist will then inject a local numbing medication into the area and then use a needle to collect cells from the site.  A marker, or clip, will then be placed in the biopsied area.  This marker is placed so this biopsy site is able to be accurately located upon future breast imaging.  After the clip is placed,  a dressing will be placed on the biopsy site.  Additional mammography films will then be taken to assure correct placement of the marker.    Educated the patient they will be awake during this procedure and are able to drive themselves home if they wish.    Educated patient that some soreness may occur after biopsy.  Discussed use of a supportive bra and ice packs after procedure, to decrease soreness.    Discussed with patient no swimming, bathing,  hot tubs , or submerging underwater for 5 days and   until the incision is closed and healed.  Educated patient on lifting restrictions - nothing heavier than a gallon of milk for 24-48 hours after the procedure.      Discussed with patient that some soreness and bruising is normal after biopsy but that prolonged or increased pain and bruising should be reported to the ordering  physician.  An ace wrap will be applied over bra for added support and should be left on for 24 hours post procedure.  Reviewed results process with patient and shared that pathology results will be available within 2-3 business days of their biopsy.  Discussed results will be communicated by their ordering physician unless otherwise indicated.  Educated patient that once we receive an order from their physician our radiology secretaries will be calling them to schedule their biopsy.

## 2024-01-11 NOTE — DISCHARGE INSTRUCTIONS
The Doctor (Radiologist) who performed your procedure was: DR LESLIE    Place an ice pack over the biopsy site on top of your bra or on top of the ACE wrap (never apply ice directly over skin) for 10-15 minutes of every hour until bedtime for your comfort and to decrease bleeding.  Keep your sports bra or the ACE wrap (stereotactic and MRI biopsy) in place for 24 hours after your biopsy. This compression decreases bleeding and breast movement for your comfort. Wear a supportive bra for the next couple of days for comfort (sports bra for sleep).   Continue to wear, preferably, a sports bra or good supportive bra for 1 week and take off only to shower.  No baths or showers during the first 24 hours after biopsy. After this time you may take a shower. It's okay if the strips get wet but do not soak them. NO saunas, hot tubs or swimming until steri-strips fall off (approx. 5 days). This prevents infection and allows time for them to completely close and heal.  DO NOT remove the steri-strips. They will fall off in 5 days. If any type of irritation (redness, itching or blisters) develops in the area around the steri-strips, remove them gently. If the steri-strips do not fall off after 5 days, gently remove them. Keep the area clean and dry.  It is normal to have mild discomfort and bruising at the biopsy site.  You may take Tylenol as needed for discomfort, as long as you have no allergies to Tylenol. Do not take aspirin, motrin, ibuprofen or any medication containing NSAID (non-steroidal anti-inflammatory drug) product for 48 hours.  DO NOT participate in strenuous activity (aerobics, heavy lifting, housework, gardening, etc.) 48 hours after your biopsy to prevent bleeding.  You will receive results in 2-3 business days.  If you are having an MRI breast biopsy or an Ultrasound guided breast biopsy, you will be billed for the biopsy and unilateral mammogram separately.  If you have any questions about the procedure or  your results, please contact the Breast Care Coordinator Nurse at [phone].  Notify your ordering physician or primary physician for increased bleeding, pain or fever over 100. Or contact a Radiology Nurse at [phone] between 8am-4pm (after 4pm, your call will be directed to the [facility]).    El doctor (radiologo) que hizo el examen fue: DR LESLIE    Used puede continuar con ashlyn actividades normales, dorothy no participe en actividades rigurosas por 24 horas (por ejemplo, tenis, aerobicos, levantamiento de pesas, o actividades repetitivas geovany tallar, etc.)  No nade luis 3 - 5 carney, hasta que el sitio de la biopsia haya cerrado y cicatrizado.  Ponga césar bolsa de hielo cubierta, encima de la ropa y vendaje, varias veces hasta que se vaya a dormir.  Puede quitarse el vendaje, si es necesario, por la manana antes de banarse. Usted puede herson zarate pecho con cuidado para que no se afloje la cinta adhesiva esterilizada.  Las cintas adhesivas o el vendaje de renan pueden retirarse chandan carney despues de la biopsia, o cuando las orillas se aflojen.   Jack ve tenga molestia leve o un tate moreton en el lugar donde la aguja entro.  Si necesita emily medicamentos para las molestias, tome productos con acetaminofen, geovany Tylenol. No tome productos que contengan aspirina o NSAID (antiinflamitorio no esteroideo) luis 48 horas.   Si sangra much o si tiene mucho dolor o fiebre (mas de 100 grados F) informelo al doctor que hizo la biopsia o a zarate medico familiar.  Si tiene dudas o inquietudes, por favor llame a zarate medico familiar a la [facility](o) de radiologia al [phone] de 8 am a 4 pm. Nota: Despues de las 4 pm pida que la comuniquen con el medico de chyna o acuda a la [facility].

## 2024-01-16 NOTE — IMAGING NOTE
History taken and as follows: Grouped heterogeneous calcifications in the posterior left breast upper outer quadrant are suspicious.  Stereotactic/tomosynthesis guided biopsy is recommended.     0900 Post instructions Given verbal et written AVS  summary sheet provided to patient. Patient verbalizes understanding and agreement.    PATIENT STATED SHE HAD SHOULDER PAIN AFTER DIAGNOSTIC MAMMOGRAM AFTER 3-4DAYS. THIS RN EXPLAINED TO PATIENT WE WILL DO BIOPSY WITH BOTH ARMS DOWN BY HER SIDE FOR COMFORT. PT AGREES WITH PLAN. RN INFORMED PT TO CALL HER PCP IF SHE HAS PAIN AFTER BIOPSY. INFORMED SHE CAN ICE BREAST AND SHOULDER FOR SORENESS.     0912 Dr LESLIE here to explain risk and benefits of procedure. Questions answered by the physcian    0913 Pt consented at  SITE MARKED LEFT  Breast    0913 Time out taken    Placed in chair  at  0915 scouts taken    0928 Chloro prep as skin prep.  Lidocaine 1% 10  Milligrams per ml 5 ml given for superficial anesthetic affect    0929 Lidocaine 1% with epinephrine  1:100,000 units for deeper anesthetic affect 15ML given.  More images taken after local  was given.    Sampling begins at 0931    Sampling complete at  0932 Core tainer taken to be imaged.    0937 Formalin added to samples.    0933  BUCKLE clip inserted . Procedure completed . Pressure to site manually by nurse  and by machine compression for 10 minutes .    No active bleeding noted.  Area cleaned steri strips to site. Ice pack to site .    0944 mammography department for post clip images. Nipple markers TO BE removed by MAMMO STAFF. Bra TO BE applied per patient. 6\" ace secured over bra.    0950 PT TO BE DISCHARGED BY MAMMO DEPT    0950 Cores taken to pathology by MAMMO STAFF

## 2024-01-17 ENCOUNTER — HOSPITAL ENCOUNTER (OUTPATIENT)
Dept: MAMMOGRAPHY | Facility: HOSPITAL | Age: 84
Discharge: HOME | End: 2024-01-17
Attending: INTERNAL MEDICINE
Payer: MEDICARE

## 2024-01-17 DIAGNOSIS — R92.1 BREAST CALCIFICATION, LEFT: ICD-10-CM

## 2024-01-17 PROCEDURE — 19081 BX BREAST 1ST LESION STRTCTC: CPT | Performed by: INTERNAL MEDICINE

## 2024-01-17 PROCEDURE — 88305 TISSUE EXAM BY PATHOLOGIST: CPT | Performed by: INTERNAL MEDICINE

## 2024-01-17 NOTE — PROCEDURES
[facility]  Procedure Note    Gilma Ghotra Patient Status:  Outpatient    1940 MRN [id number]   Location [facility] Attending Tommy Adams MD   Hosp Day # 0 PCP Tommy Adams MD     Procedure: Left breast stereotactic/yasir guided biopsy    Pre-Procedure Diagnosis:  Left breast calcifications    Post-Procedure Diagnosis: Left breast calcifications    Anesthesia:  Local    Findings:  Left breast calcifications    Specimens: multiple cores    Blood Loss: minimal    Tourniquet Time: none  Complications:  None  Drains:  none    Amy Lee MD  2024

## 2024-01-19 ENCOUNTER — TELEPHONE (OUTPATIENT)
Dept: MAMMOGRAPHY | Facility: HOSPITAL | Age: 84
End: 2024-01-19

## 2024-01-19 NOTE — TELEPHONE ENCOUNTER
Gilma Ghotra is s/p biopsy .  Phoned utilizing language line Amharic [employer] client [id number] and introduced myself as breast coordinator .  Reinforced to patient  post biopsy care and instructions .\"I didn't need anything for pain no pain at all\" No c/o post Bx verbalizes feeling so happy with results.Tearful during results emotional support given.     Informed  and shared the pathology results as well as the recommendations from Dr Lee for her breast imaging  as follows:      Pathology results shared (see epic for dictated pathology and radiology procedure report)  and recommendations are as follows:          Pathology demonstrates benign finding and is concordant with imaging.       RECOMMENDATION:  As long as the patient's clinical breast exam remains unchanged, patient should return to annual screening mammogram schedule due January 2025.             Ms.MerlineKARINA Mirandacknowledges the above and denies questions. Gilma Ghotra was also instructed to perform breast self exams and if any changes  develops any changes to contact ordering  physician immediately  for re evaluation..  Gilma Ghotraverbalizes understanding and agreement.

## 2024-02-19 ENCOUNTER — NURSE ONLY (OUTPATIENT)
Dept: INTERNAL MEDICINE CLINIC | Facility: CLINIC | Age: 84
End: 2024-02-19
Payer: COMMERCIAL

## 2024-02-19 DIAGNOSIS — Z85.3 PERSONAL HISTORY OF BREAST CANCER: ICD-10-CM

## 2024-02-19 DIAGNOSIS — E78.00 HYPERCHOLESTEREMIA: ICD-10-CM

## 2024-02-19 DIAGNOSIS — E11.3293 CONTROLLED TYPE 2 DIABETES MELLITUS WITH BOTH EYES AFFECTED BY MILD NONPROLIFERATIVE RETINOPATHY WITHOUT MACULAR EDEMA, WITHOUT LONG-TERM CURRENT USE OF INSULIN (HCC): Primary | ICD-10-CM

## 2024-02-19 DIAGNOSIS — Z00.00 LABORATORY EXAMINATION ORDERED AS PART OF A ROUTINE GENERAL MEDICAL EXAMINATION: ICD-10-CM

## 2024-02-19 LAB
ALBUMIN SERPL-MCNC: 4.7 G/DL (ref 3.2–4.8)
ALBUMIN/GLOB SERPL: 1.4 {RATIO} (ref 1–2)
ALP LIVER SERPL-CCNC: 118 U/L
ALT SERPL-CCNC: 21 U/L
ANION GAP SERPL CALC-SCNC: 9 MMOL/L (ref 0–18)
AST SERPL-CCNC: 29 U/L (ref ?–34)
BASOPHILS # BLD AUTO: 0.05 X10(3) UL (ref 0–0.2)
BASOPHILS NFR BLD AUTO: 0.7 %
BILIRUB SERPL-MCNC: 0.7 MG/DL (ref 0.2–1.1)
BUN BLD-MCNC: 10 MG/DL (ref 9–23)
BUN/CREAT SERPL: 18.2 (ref 10–20)
CALCIUM BLD-MCNC: 10 MG/DL (ref 8.7–10.4)
CHLORIDE SERPL-SCNC: 98 MMOL/L (ref 98–112)
CHOLEST SERPL-MCNC: 142 MG/DL (ref ?–200)
CO2 SERPL-SCNC: 28 MMOL/L (ref 21–32)
CREAT BLD-MCNC: 0.55 MG/DL
DEPRECATED RDW RBC AUTO: 41.6 FL (ref 35.1–46.3)
EGFRCR SERPLBLD CKD-EPI 2021: 91 ML/MIN/1.73M2 (ref 60–?)
EOSINOPHIL # BLD AUTO: 0.12 X10(3) UL (ref 0–0.7)
EOSINOPHIL NFR BLD AUTO: 1.7 %
ERYTHROCYTE [DISTWIDTH] IN BLOOD BY AUTOMATED COUNT: 12.7 % (ref 11–15)
EST. AVERAGE GLUCOSE BLD GHB EST-MCNC: 140 MG/DL (ref 68–126)
FASTING PATIENT LIPID ANSWER: YES
FASTING STATUS PATIENT QL REPORTED: YES
GLOBULIN PLAS-MCNC: 3.3 G/DL (ref 2.8–4.4)
GLUCOSE BLD-MCNC: 113 MG/DL (ref 70–99)
HBA1C MFR BLD: 6.5 % (ref ?–5.7)
HCT VFR BLD AUTO: 40.2 %
HDLC SERPL-MCNC: 57 MG/DL (ref 40–59)
HGB BLD-MCNC: 13.5 G/DL
IMM GRANULOCYTES # BLD AUTO: 0.02 X10(3) UL (ref 0–1)
IMM GRANULOCYTES NFR BLD: 0.3 %
LDLC SERPL CALC-MCNC: 70 MG/DL (ref ?–100)
LYMPHOCYTES # BLD AUTO: 2.63 X10(3) UL (ref 1–4)
LYMPHOCYTES NFR BLD AUTO: 37.2 %
MCH RBC QN AUTO: 30.4 PG (ref 26–34)
MCHC RBC AUTO-ENTMCNC: 33.6 G/DL (ref 31–37)
MCV RBC AUTO: 90.5 FL
MONOCYTES # BLD AUTO: 0.64 X10(3) UL (ref 0.1–1)
MONOCYTES NFR BLD AUTO: 9.1 %
NEUTROPHILS # BLD AUTO: 3.61 X10 (3) UL (ref 1.5–7.7)
NEUTROPHILS # BLD AUTO: 3.61 X10(3) UL (ref 1.5–7.7)
NEUTROPHILS NFR BLD AUTO: 51 %
NONHDLC SERPL-MCNC: 85 MG/DL (ref ?–130)
OSMOLALITY SERPL CALC.SUM OF ELEC: 280 MOSM/KG (ref 275–295)
PLATELET # BLD AUTO: 228 10(3)UL (ref 150–450)
POTASSIUM SERPL-SCNC: 4.1 MMOL/L (ref 3.5–5.1)
PROT SERPL-MCNC: 8 G/DL (ref 5.7–8.2)
RBC # BLD AUTO: 4.44 X10(6)UL
SODIUM SERPL-SCNC: 135 MMOL/L (ref 136–145)
TRIGL SERPL-MCNC: 74 MG/DL (ref 30–149)
VLDLC SERPL CALC-MCNC: 11 MG/DL (ref 0–30)
WBC # BLD AUTO: 7.1 X10(3) UL (ref 4–11)

## 2024-02-19 PROCEDURE — 80061 LIPID PANEL: CPT | Performed by: INTERNAL MEDICINE

## 2024-02-19 PROCEDURE — 85025 COMPLETE CBC W/AUTO DIFF WBC: CPT | Performed by: INTERNAL MEDICINE

## 2024-02-19 PROCEDURE — 80053 COMPREHEN METABOLIC PANEL: CPT | Performed by: INTERNAL MEDICINE

## 2024-02-19 PROCEDURE — 36415 COLL VENOUS BLD VENIPUNCTURE: CPT | Performed by: INTERNAL MEDICINE

## 2024-02-19 PROCEDURE — 83036 HEMOGLOBIN GLYCOSYLATED A1C: CPT | Performed by: INTERNAL MEDICINE

## 2024-02-19 PROCEDURE — 1159F MED LIST DOCD IN RCRD: CPT | Performed by: INTERNAL MEDICINE

## 2024-02-19 NOTE — PROGRESS NOTES
Patient came in today to get labs done, R antecubital was used to collect blood without complications.  Specimen sent to the lab.

## 2024-02-28 ENCOUNTER — OFFICE VISIT (OUTPATIENT)
Dept: INTERNAL MEDICINE CLINIC | Facility: CLINIC | Age: 84
End: 2024-02-28
Payer: COMMERCIAL

## 2024-02-28 VITALS
TEMPERATURE: 98 F | OXYGEN SATURATION: 98 % | HEART RATE: 90 BPM | SYSTOLIC BLOOD PRESSURE: 124 MMHG | WEIGHT: 103 LBS | DIASTOLIC BLOOD PRESSURE: 62 MMHG | HEIGHT: 56.5 IN | BODY MASS INDEX: 22.53 KG/M2

## 2024-02-28 DIAGNOSIS — Z00.00 ENCOUNTER FOR ANNUAL HEALTH EXAMINATION: ICD-10-CM

## 2024-02-28 DIAGNOSIS — I10 ESSENTIAL HYPERTENSION: ICD-10-CM

## 2024-02-28 DIAGNOSIS — M67.90 TENDINOPATHY: ICD-10-CM

## 2024-02-28 DIAGNOSIS — M25.511 CHRONIC PAIN OF BOTH SHOULDERS: ICD-10-CM

## 2024-02-28 DIAGNOSIS — E11.3293 CONTROLLED TYPE 2 DIABETES MELLITUS WITH BOTH EYES AFFECTED BY MILD NONPROLIFERATIVE RETINOPATHY WITHOUT MACULAR EDEMA, WITHOUT LONG-TERM CURRENT USE OF INSULIN (HCC): ICD-10-CM

## 2024-02-28 DIAGNOSIS — M85.89 OSTEOPENIA OF MULTIPLE SITES: ICD-10-CM

## 2024-02-28 DIAGNOSIS — M25.512 CHRONIC PAIN OF BOTH SHOULDERS: ICD-10-CM

## 2024-02-28 DIAGNOSIS — Z85.828 HISTORY OF BASAL CELL CARCINOMA: ICD-10-CM

## 2024-02-28 DIAGNOSIS — Z00.00 MEDICARE ANNUAL WELLNESS VISIT, SUBSEQUENT: Primary | ICD-10-CM

## 2024-02-28 DIAGNOSIS — Z96.1 PSEUDOPHAKIA OF BOTH EYES: ICD-10-CM

## 2024-02-28 DIAGNOSIS — M25.512 CHRONIC LEFT SHOULDER PAIN: ICD-10-CM

## 2024-02-28 DIAGNOSIS — K57.30 DIVERTICULOSIS OF COLON: ICD-10-CM

## 2024-02-28 DIAGNOSIS — G89.29 CHRONIC PAIN OF BOTH SHOULDERS: ICD-10-CM

## 2024-02-28 DIAGNOSIS — H35.372 EPIRETINAL MEMBRANE (ERM) OF LEFT EYE: ICD-10-CM

## 2024-02-28 DIAGNOSIS — E78.00 HYPERCHOLESTEREMIA: ICD-10-CM

## 2024-02-28 DIAGNOSIS — H40.003 GLAUCOMA SUSPECT OF BOTH EYES: ICD-10-CM

## 2024-02-28 DIAGNOSIS — Z85.3 PERSONAL HISTORY OF BREAST CANCER: ICD-10-CM

## 2024-02-28 DIAGNOSIS — Z90.11 STATUS POST RIGHT MASTECTOMY: ICD-10-CM

## 2024-02-28 DIAGNOSIS — G89.29 CHRONIC LEFT SHOULDER PAIN: ICD-10-CM

## 2024-02-28 RX ORDER — ATORVASTATIN CALCIUM 10 MG/1
5 TABLET, FILM COATED ORAL NIGHTLY
Qty: 45 TABLET | Refills: 1 | Status: SHIPPED | OUTPATIENT
Start: 2024-02-28

## 2024-02-28 RX ORDER — NAPROXEN 500 MG/1
500 TABLET ORAL 2 TIMES DAILY PRN
Qty: 60 TABLET | Refills: 1 | Status: SHIPPED | OUTPATIENT
Start: 2024-02-28

## 2024-02-28 RX ORDER — ENALAPRIL MALEATE 2.5 MG/1
2.5 TABLET ORAL DAILY
Qty: 90 TABLET | Refills: 1 | Status: SHIPPED | OUTPATIENT
Start: 2024-02-28

## 2024-02-29 ENCOUNTER — TELEPHONE (OUTPATIENT)
Dept: PHYSICAL THERAPY | Facility: HOSPITAL | Age: 84
End: 2024-02-29

## 2024-02-29 NOTE — PATIENT INSTRUCTIONS
Gilma Ghotra's SCREENING SCHEDULE   Tests on this list are recommended by your physician but may not be covered, or covered at this frequency, by your insurer.   Please check with your insurance carrier before scheduling to verify coverage.   PREVENTATIVE SERVICES FREQUENCY &  COVERAGE DETAILS LAST COMPLETION DATE   Diabetes Screening    Fasting Blood Sugar /  Glucose    One screening every 12 months if never tested or if previously tested but not diagnosed with pre-diabetes   One screening every 6 months if diagnosed with pre-diabetes Lab Results   Component Value Date     (H) 02/19/2024        Cardiovascular Disease Screening    Lipid Panel  Cholesterol  Lipoprotein (HDL)  Triglycerides Covered every 5 years for all Medicare beneficiaries without apparent signs or symptoms of cardiovascular disease Lab Results   Component Value Date    CHOLEST 142 02/19/2024    HDL 57 02/19/2024    LDL 70 02/19/2024    TRIG 74 02/19/2024         Electrocardiogram (EKG)   Covered if needed at Welcome to Medicare, and non-screening if indicated for medical reasons 06/05/2023      Ultrasound Screening for Abdominal Aortic Aneurysm (AAA) Covered once in a lifetime for one of the following risk factors   • Men who are 65-75 years old and have ever smoked   • Anyone with a family history -     Colorectal Cancer Screening  Covered for ages 50-85; only need ONE of the following:    Colonoscopy   Covered every 10 years    Covered every 2 years if patient is at high risk or previous colonoscopy was abnormal 11/28/2017    Health Maintenance   Topic Date Due   • Colorectal Cancer Screening  09/08/2023       Flexible Sigmoidoscopy   Covered every 4 years -    Fecal Occult Blood Test Covered annually 09/07/2023   Bone Density Screening    Bone density screening    Covered every 2 years after age 65 if diagnosed with risk of osteoporosis or estrogen deficiency.    Covered yearly for long-term glucocorticoid medication use (Steroids)  No results found for this or any previous visit.      No recommendations at this time   Pap and Pelvic    Pap   Covered every 2 years for women at normal risk; Annually if at high risk -  No recommendations at this time    Chlamydia Annually if high risk 09/07/2023  No recommendations at this time   Screening Mammogram    Mammogram     Recommend annually for all female patients aged 40 and older    One baseline mammogram covered for patients aged 35-39 01/11/2024    No recommendations at this time    Immunizations    Influenza Covered once per flu season  Please get every year -  Influenza Vaccine(1) due on 10/01/2023    Pneumococcal Each vaccine (Trlkajl14 & [id number]) covered once after 65 Prevnar 13: 08/24/2022    Hcoebsjrv54: 05/19/2021     No recommendations at this time    Hepatitis B One screening covered for patients with certain risk factors   -  No recommendations at this time    Tetanus Toxoid Not covered by Medicare Part B unless medically necessary (cut with metal); may be covered with your pharmacy prescription benefits -    Tetanus, Diptheria and Pertusis TD and TDaP Not covered by Medicare Part B -  No recommendations at this time    Zoster Not covered by Medicare Part B; may be covered with your pharmacy  prescription benefits -  Zoster Vaccines(1 of 2) Never done     Diabetes      Hemoglobin A1C Annually; if last result is elevated, may be asked to retest more frequently.    Medicare covers every 3 months Lab Results   Component Value Date     (H) 02/19/2024    A1C 6.5 (H) 02/19/2024       No recommendations at this time    Creat/alb ratio Annually Lab Results   Component Value Date    MICROALBCREA  08/28/2023      Comment:      Unable to calculate due to Urine Creatinine <13.00 mg/dL         LDL Annually Lab Results   Component Value Date    LDL 70 02/19/2024       Dilated Eye Exam Annually [unfilled]     Annual Monitoring of Persistent Medications (ACE/ARB, digoxin diuretics,  anticonvulsants)    Potassium Annually Lab Results   Component Value Date    K 4.1 02/19/2024         Creatinine   Annually Lab Results   Component Value Date    CREATSERUM 0.55 02/19/2024         BUN Annually Lab Results   Component Value Date    BUN 10 02/19/2024       Drug Serum Conc Annually No results found for: \"DIGOXIN\", \"DIG\", \"VALP\"

## 2024-02-29 NOTE — PROGRESS NOTES
Subjective:   Gilma Ghotra is a 84 year old female who presents for a MA (Medicare Advantage) Supervisit (Once per calendar year) and scheduled follow up of multiple significant but stable problems.       History/Other:   Fall Risk Assessment:   She has been screened for Falls and is low risk.      Cognitive Assessment:   She had a completely normal cognitive assessment - see flowsheet entries     Functional Ability/Status:   Gilma Ghotra has some abnormal functions as listed below:  She has Driving difficulties based on screening of functional status. She has Hearing problems based on screening of functional status.She has Vision problems based on screening of functional status.         Advanced Directives:   She does NOT have a Living Will. [Do you have a living will?: No]  She does NOT have a Power of  for Health Care. 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    Current Medications:  Outpatient Medications Marked as Taking for the 2/28/24 encounter (Office Visit) with Tommy Adams MD   Medication Sig    enalapril 2.5 MG Oral Tab Take 1 tablet (2.5 mg total) by mouth daily.    atorvastatin 10 MG Oral Tab Take 0.5 tablets (5 mg total) by mouth nightly.    naproxen 500 MG Oral Tab Take 1 tablet (500 mg total) by mouth 2 (two) times daily as needed.    famotidine 20 MG Oral  Tab Take 1 tablet (20 mg total) by mouth daily.       Medical History:  She  has a past medical history of Basal cell carcinoma (BCC) of right lower extremity (11/4/2019), BCC (basal cell carcinoma of skin) (2019), Breast cancer (HCC) (2001), Diabetes (HCC), Hearing impairment, High blood pressure, High cholesterol, Hyperlipidemia, and Visual impairment.  Surgical History:  She  has a past surgical history that includes biopsy of skin lesion; cataract; skin tissue rearrangement (Right, 11/22/2019); mastectomy right (2001); and baylee biopsy stereo nodule 1 site left (cpt=19081) (Left, 07/05/2023).   Family History:  Her family history includes Alcohol and Other Disorders Associated in her son; Breast Cancer (age of onset: 61) in her self; Heart Attack in her father; No Known Problems in her daughter; Stroke in her mother.  Social History:  She  reports that she has never smoked. She has never used smokeless tobacco. She reports current alcohol use. She reports that she does not use drugs.    Tobacco:  She has never smoked tobacco.    Patient Care Team:  Tommy Adams MD as PCP - General (Internal Medicine)    Review of Systems  GENERAL: feels well otherwise  SKIN: denies any unusual skin lesions  EYES: denies blurred vision or double vision  HEENT: denies nasal congestion, sinus pain or ST  LUNGS: denies shortness of breath with exertion  CARDIOVASCULAR: denies chest pain on exertion  GI: denies abdominal pain, denies heartburn  : denies dysuria, vaginal discharge or itching, no complaint of urinary incontinence   MUSCULOSKELETAL: denies back pain  NEURO: denies headaches  PSYCHE: denies depression or anxiety  HEMATOLOGIC: denies hx of anemia  ENDOCRINE: denies thyroid history  ALL/ASTHMA: denies hx of allergy or asthma    Objective:   Physical Exam  General Appearance:  Alert, cooperative, no distress, appears stated age   Head:  Normocephalic, without obvious abnormality, atraumatic   Eyes:  PERRL,  conjunctiva/corneas clear, EOM's intact both eyes   Ears:  Normal TM's and external ear canals, both ears   Nose: Nares normal, septum midline,mucosa normal, no drainage or sinus tenderness   Throat: Lips, mucosa, and tongue normal; teeth and gums normal   Neck: Supple, symmetrical, trachea midline, no adenopathy;  thyroid: not enlarged, symmetric, no tenderness/mass/nodules; no carotid bruit or JVD   Back:   Symmetric, no curvature, ROM normal, no CVA tenderness   Lungs:   Clear to auscultation bilaterally, respirations unlabored   Heart:  Regular rate and rhythm, S1 and S2 normal, no murmur, rub, or gallop   Abdomen:   Soft, non-tender, bowel sounds active all four quadrants,  no masses, no organomegaly   Pelvic: Deferred   Extremities: Extremities normal, atraumatic, no cyanosis or edema   Pulses: 2+ and symmetric   Skin: Skin color, texture, turgor normal, no rashes or lesions   Lymph nodes: Cervical, supraclavicular, and axillary nodes normal   Neurologic: Normal       /62 (BP Location: Right arm, Patient Position: Sitting, Cuff Size: adult)   Pulse 90   Temp 97.6 °F (36.4 °C) (Temporal)   Ht 4' 8.5\" (1.435 m)   Wt 103 lb (46.7 kg)   SpO2 98%   BMI 22.69 kg/m²  Estimated body mass index is 22.69 kg/m² as calculated from the following:    Height as of this encounter: 4' 8.5\" (1.435 m).    Weight as of this encounter: 103 lb (46.7 kg).    Medicare Hearing Assessment:   Hearing Screening    Screening Method: Finger Rub, Whisper Test  Finger Rub Result: Pass  Whisper Test Result: Pass               Visual Acuity:   Right Eye Visual Acuity: Uncorrected Right Eye Chart Acuity: 20/30   Left Eye Visual Acuity: Uncorrected Left Eye Chart Acuity: 20/30     Both Eyes Chart Acuity: 20/30   Able To Tolerate Visual Acuity: Yes        Assessment & Plan:   Gilma Ghotra is a 84 year old female who presents for a Medicare Assessment.     1. Medicare annual wellness visit, subsequent (Primary)  -During the annual  physical exam I spent extensive time discussing medical history including existing conditions, past surgeries, allergies and medications.  Lifestyle factors discussed including diet, exercise and substance abuse including alcohol and tobacco if warranted.  Counseled on screening tests based on age and underlying risk factors which may include but not limited to blood pressure measurement, cholesterol screening, diabetes screening and prostate cancer screening.  Preventative screenings discussed.  Reviewed immunizations.  Sexual health discussed if appropriate.  Family history reviewed.  I addressed any specific concerns or symptoms that the patient had.      2. Chronic left shoulder pain  -     Physical Therapy Referral - [facility]    3. Tendinopathy  -     Physical Therapy Referral - [facility]    4. Essential hypertension  Comments:  Controlled.  Refilled meds.   Orders:  -     Enalapril Maleate; Take 1 tablet (2.5 mg total) by mouth daily.  Dispense: 90 tablet; Refill: 1    5. Hypercholesteremia  Comments:  Controlled Refilled meds  Orders:  -     Atorvastatin Calcium; Take 0.5 tablets (5 mg total) by mouth nightly.  Dispense: 45 tablet; Refill: 1    6. 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 Continue with dietary focus.  Not on meds.      7. Osteopenia of multiple sites  -  Discussed recommendations.  Increase multicomponent exercise with strength and balance training    A balanced diet consisting of Vitamin D, Calcium, Protein, Vegetable and fruits is recommended.  Alcohol moderation and decreased caffeine intake to less than 2.5 cups of coffee or less than 5 cups of tea per day are recommended.  Stop smoking if that is a risk factor.     8. Diverticulosis of colon  -Stable    9. Chronic pain of both shoulders  - Physical therapy.  Pain stemming from  OA.    10. History of basal cell carcinoma  - Follow up with dermatology    11. Personal history of breast cancer  - Up to date with mammograms.  Recently underwent biopsy....benign.      12. Status post right mastectomy  - Stable     13. Pseudophakia of both eyes  -Follow with ophtalmology     14. Epiretinal membrane (ERM) of left eye  Overview:  Mild    15. Glaucoma suspect of both eyes  Overview:  with normal visual fields    16. Encounter for annual health examination  Other orders  -     Naproxen; Take 1 tablet (500 mg total) by mouth 2 (two) times daily as needed.  Dispense: 60 tablet; Refill: 1    The patient indicates understanding of these issues and agrees to the plan.  Reinforced healthy diet, lifestyle, and exercise.      Return in about 6 months (around 8/28/2024).     Tommy Adams MD, 2/29/2024     Supplementary Documentation:   General Health:  In the past six months, have you lost more than 10 pounds without trying?: 2 - No  Has your appetite been poor?: No  Type of Diet: Balanced  How does the patient maintain a good energy level?: Appropriate Exercise;Daily Walks  How would you describe your daily physical activity?: Moderate  How would you describe your current health state?: Fair  How do you maintain positive mental well-being?: Puzzles  Have you had any immunizations at another office such as Influenza, Hepatitis B, Tetanus, or Pneumococcal?: Rosa Ghotra's SCREENING SCHEDULE   Tests on this list are recommended by your physician but may not be covered, or covered at this frequency, by your insurer.   Please check with your insurance carrier before scheduling to verify coverage.   PREVENTATIVE SERVICES FREQUENCY &  COVERAGE DETAILS LAST COMPLETION DATE   Diabetes Screening    Fasting Blood Sugar /  Glucose    One screening every 12 months if never tested or if previously tested but not diagnosed with pre-diabetes   One screening every 6 months if diagnosed with pre-diabetes  Lab Results   Component Value Date     (H) 02/19/2024        Cardiovascular Disease Screening    Lipid Panel  Cholesterol  Lipoprotein (HDL)  Triglycerides Covered every 5 years for all Medicare beneficiaries without apparent signs or symptoms of cardiovascular disease Lab Results   Component Value Date    CHOLEST 142 02/19/2024    HDL 57 02/19/2024    LDL 70 02/19/2024    TRIG 74 02/19/2024         Electrocardiogram (EKG)   Covered if needed at Welcome to Medicare, and non-screening if indicated for medical reasons 06/05/2023      Ultrasound Screening for Abdominal Aortic Aneurysm (AAA) Covered once in a lifetime for one of the following risk factors    Men who are 65-75 years old and have ever smoked    Anyone with a family history -     Colorectal Cancer Screening  Covered for ages 50-85; only need ONE of the following:    Colonoscopy   Covered every 10 years    Covered every 2 years if patient is at high risk or previous colonoscopy was abnormal 11/28/2017    Health Maintenance   Topic Date Due    Colorectal Cancer Screening  09/08/2023       Flexible Sigmoidoscopy   Covered every 4 years -    Fecal Occult Blood Test Covered annually 09/07/2023   Bone Density Screening    Bone density screening    Covered every 2 years after age 65 if diagnosed with risk of osteoporosis or estrogen deficiency.    Covered yearly for long-term glucocorticoid medication use (Steroids) No results found for this or any previous visit.      No recommendations at this time   Pap and Pelvic    Pap   Covered every 2 years for women at normal risk; Annually if at high risk -  No recommendations at this time    Chlamydia Annually if high risk 09/07/2023  No recommendations at this time   Screening Mammogram    Mammogram     Recommend annually for all female patients aged 40 and older    One baseline mammogram covered for patients aged 35-39 01/11/2024    No recommendations at this time    Immunizations    Influenza Covered once per  flu season  Please get every year -  Influenza Vaccine(1) due on 10/01/2023    Pneumococcal Each vaccine ([id number] & [id number]) covered once after 65 Prevnar 13: 08/24/2022    Dhenxttna27: 05/19/2021     No recommendations at this time    Hepatitis B One screening covered for patients with certain risk factors   -  No recommendations at this time    Tetanus Toxoid Not covered by Medicare Part B unless medically necessary (cut with metal); may be covered with your pharmacy prescription benefits -    Tetanus, Diptheria and Pertusis TD and TDaP Not covered by Medicare Part B -  No recommendations at this time    Zoster Not covered by Medicare Part B; may be covered with your pharmacy  prescription benefits -  Zoster Vaccines(1 of 2) Never done     Diabetes      Hemoglobin A1C Annually; if last result is elevated, may be asked to retest more frequently.    Medicare covers every 3 months Lab Results   Component Value Date     (H) 02/19/2024    A1C 6.5 (H) 02/19/2024       No recommendations at this time    Creat/alb ratio Annually Lab Results   Component Value Date    MICROALBCREA  08/28/2023      Comment:      Unable to calculate due to Urine Creatinine <13.00 mg/dL         LDL Annually Lab Results   Component Value Date    LDL 70 02/19/2024       Dilated Eye Exam Annually Last Diabetic Eye Exam:  Last Dilated Eye Exam: 05/16/23  Eye Exam shows Diabetic Retinopathy?: No       Annual Monitoring of Persistent Medications (ACE/ARB, digoxin diuretics, anticonvulsants)    Potassium Annually Lab Results   Component Value Date    K 4.1 02/19/2024         Creatinine   Annually Lab Results   Component Value Date    CREATSERUM 0.55 02/19/2024         BUN Annually Lab Results   Component Value Date    BUN 10 02/19/2024       Drug Serum Conc Annually No results found for: \"DIGOXIN\", \"DIG\", \"VALP\"

## 2024-03-04 ENCOUNTER — OFFICE VISIT (OUTPATIENT)
Dept: PHYSICAL THERAPY | Facility: HOSPITAL | Age: 84
End: 2024-03-04
Attending: INTERNAL MEDICINE
Payer: MEDICARE

## 2024-03-04 DIAGNOSIS — M67.90 TENDINOPATHY: ICD-10-CM

## 2024-03-04 DIAGNOSIS — G89.29 CHRONIC LEFT SHOULDER PAIN: Primary | ICD-10-CM

## 2024-03-04 DIAGNOSIS — M25.512 CHRONIC LEFT SHOULDER PAIN: Primary | ICD-10-CM

## 2024-03-04 PROCEDURE — 97162 PT EVAL MOD COMPLEX 30 MIN: CPT

## 2024-03-04 PROCEDURE — 97110 THERAPEUTIC EXERCISES: CPT

## 2024-03-04 NOTE — PROGRESS NOTES
SHOULDER EVALUATION:     Diagnosis:   Chronic left shoulder pain (M25.512,G89.29)  Tendinopathy (M67.90)      Right shoulder pain     Referring Provider: Bryan  Date of Evaluation:    3/4/2024    Precautions:  Cancer, HTN Next MD visit:   none scheduled  Date of Surgery: n/a     PATIENT SUMMARY   Gilma Ghotra is a 84 year old female who presents to therapy today with complaints of B shoulder pain.     History of Current Condition: Pain in L shoulder began 6 months ago, states she thinks pain began after mammogram with having her arm overhead for prolonged positioning. Then followed up with her PCP and was given pain medication with primary care doctor, states they helped but did not fix it. If therapy does not work pt states she will follow up with orthopedic doctor.    Feels as though the pain is getting worse slowly. Was told that she has arthritis in both shoulders but isn't sure if there is anything she can do about it.   Denies N/T, denies dropping things    Imaging (X-ray, 8/2023): \"CONCLUSION:   1. No acute fracture or dislocation.   2. Mild left shoulder arthropathy.\"    Pt describes pain level current 4/10, at best 0/10, at worst 4/10.   Quality, location: throughout L shoulder complex, aching    Relieving: Tried ice/heat after seeing her doctor but found it did not help.   Aggravating; reaching overhead, cooking    Current functional limitations include sleeping on L shoulder, reaching overhead, carry groceries, washing her back, donning/doffing jacket and shirt, and carrying a pot in the kitchen  .    Not driving for last 5 years.     Home Set Up: Lives with    Occupation: Retired. Responsible for cooking, cleaning, yard work.  Physical Activity Level: goes to gym 3-4x/week. Dima and pilates classes, was previously swimming but cannot anymore because of her shoulder   Enjoys walking outside.     Gilma describes prior level of function fully independent. Pt goals include be able to  return to her regular activity and be back to the gym.  Past medical history was reviewed with Gilma. Significant findings include   Past Medical History:   Diagnosis Date    Basal cell carcinoma (BCC) of right lower extremity 11/4/2019    BCC (basal cell carcinoma of skin) 2019    right tibia    Breast cancer (HCC) 2001    s/p R breast mastectomy @ [facility] and on pharmacutical treatment x5 years subsequently (Dr. Harris)    Diabetes (HCC)     diet controlled    Hearing impairment     hard of hearing    High blood pressure     High cholesterol     Hyperlipidemia     Visual impairment     glasses       ASSESSMENT  Gilma presents to physical therapy evaluation with primary c/o B (L>R) shoulder pain and stiffness . The results of the objective tests and measures show bilateral decreased shoulder AROM, decreased strength, joint hypomobility, forward protracted scapulae, and mild TTP.  Functional deficits include but are not limited to sleeping on L shoulder, reaching overhead, carry groceries, washing her back, donning/doffing jacket and shirt, and carrying a pot in the kitchen.  Signs and symptoms are consistent with diagnosis of referring diagnosis. Pt and PT discussed evaluation findings, pathology, POC and HEP.  Pt voiced understanding and performs HEP correctly without reported pain. Skilled Physical Therapy is medically necessary to address the above impairments and reach functional goals.     OBJECTIVE:   Observation/Posture: Presents independently, no AD. Forward protracted shoulder bilaterally  Palpation: TTP along L rhomboid, lateral GH complex   Sensation: Grossly in tact to light touch BUE  Cervical Screen: Grossly intact all direction, no sx reproduction     AROM: (* denotes performed with pain)  Shoulder  Elbow   Flexion: R 125; L 90* (6/10)  Abduction: R 50; L 130   ER: R C6 (slow, cautious); L T1  IR: R L2 ; L T12*  Flexion: R WNL; L WNL  Extension: R WNL; L WNL     Accessory motion:  hypomobility B (L>R) GH joint, inferior/posterior. No pain     Strength/MMT: (* denotes performed with pain)  Shoulder Elbow   Flexion: R 3+*/5; L 3+*/5  Abduction: R 3-*/5; L 3+*/5  ER: R 4-/5; L 4/5  IR: R 4/5; L 4/5 Flexion: R 4/5; L 4/5     Special tests:     Scapular Position: R scapulae elevated and protracted as compared to L      Today’s Treatment and Response:   Pt education was provided on exam findings, treatment diagnosis, treatment plan, expectations, and prognosis. Pt was also provided recommendations for activity modifications, possible soreness after evaluation, modalities as needed [ice/heat], postural corrections, ergonomics, and importance of remaining active.  Patient was instructed in and issued a HEP for:     Access Code: [email or web address]  Exercises  - Seated Scapular Retraction  - 1 x daily - 7 x weekly - 2 sets - 10 reps - 3 second hold  - Supine Shoulder Flexion with Dowel to 90  - 1 x daily - 7 x weekly - 2 sets - 10 reps  - Circular Shoulder Pendulum with Table Support  - 1 x daily - 7 x weekly - 2 sets - 10 reps    Charges: PT Eval Moderate Complexity, 1TE      Total Timed Treatment: 10' min     Total Treatment Time: 45' min     Based on clinical rationale and outcome measures, this evaluation involved Moderate Complexity decision making due to 1-2 personal factors/comorbidities, 3 body structures involved/activity limitations, and evolving symptoms including changing pain levels.  PLAN OF CARE:    Goals: (to be met in 10 visits)   Pt will report improved ability to sleep without waking due to shoulder pain   Pt will improve bilateral shoulder flexion AROM to >125 degrees to be able to reach into overhead cabinets without pain or restriction   Pt will improve shoulder abduction AROM to >120 degrees to improve ability to don deodorant, don/doff shirts, and wash hair   Pt will increase shoulder AROM ER to T1 to be able to reach and fasten seatbelt   Pt will increase shoulder AROM IR to L1 to be  able to reach in back pocket, tuck in shirt, and turn steering wheel without pain  Pt will improve shoulder strength throughout to 4/5 to improve function with cooking and carrying a pot of water.    Pt will be independent and compliant with comprehensive HEP to maintain progress achieved in PT       Frequency / Duration: Patient will be seen for 1-2 x/week or a total of 10 visits over a 90 day period. Treatment will include: Manual Therapy, Neuromuscular Re-education, Therapeutic Activities, Therapeutic Exercise, and Home Exercise Program instruction    Education or treatment limitation: None  Rehab Potential:good    QuickDASH Outcome Score  Score: 34.09 % (3/4/2024  4:50 PM)      Patient/Family/Caregiver was advised of these findings, precautions, and treatment options and has agreed to actively participate in planning and for this course of care.    Thank you for your referral. Please co-sign or sign and return this letter via fax as soon as possible to [phone]. If you have any questions, please contact me at Dept: [phone]    Sincerely,  Electronically signed by therapist: Lauryn Webb, PT  Physician's certification required: Yes  I certify the need for these services furnished under this plan of treatment and while under my care.    X___________________________________________________ Date____________________    Certification From: 3/4/2024  To:6/2/2024

## 2024-03-07 ENCOUNTER — OFFICE VISIT (OUTPATIENT)
Dept: PHYSICAL THERAPY | Facility: HOSPITAL | Age: 84
End: 2024-03-07
Attending: INTERNAL MEDICINE
Payer: MEDICARE

## 2024-03-07 PROCEDURE — 97110 THERAPEUTIC EXERCISES: CPT

## 2024-03-07 PROCEDURE — 97140 MANUAL THERAPY 1/> REGIONS: CPT

## 2024-03-07 PROCEDURE — 97112 NEUROMUSCULAR REEDUCATION: CPT

## 2024-03-07 NOTE — PROGRESS NOTES
Diagnosis:   Chronic left shoulder pain (M25.512,G89.29)  Tendinopathy (M67.90)    R shoulder pain      Referring Provider: Bryan  Date of Evaluation:    3/4/2024    Precautions:   Next MD visit:   none scheduled  Date of Surgery: n/a   Insurance Primary/Secondary: [employer] / N/A     # Auth Visits: no auth, 10 per POC            Subjective: Felt good for 2 days but this morning is a little sore. States that she did the exercises 4 times yesterday and thinks maybe that was too much. Tried wall slide stretch at home and was able to do it. Finds sleeping to be difficult because both shoulders (L>R) get sore when she's in one position too long.     Pain: 4/10 L humerus (start of session)    0/10 end of session      Objective:   Current functional limitations include sleeping on L shoulder, reaching overhead, carry groceries, washing her back, donning/doffing jacket and shirt, and carrying a pot in the kitchen  AROM: (* denotes performed with pain)  Shoulder  Elbow   Flexion: R 125; L 90* (6/10)  Abduction: R 50; L 130   ER: R C6 (slow, cautious); L T1  IR: R L2 ; L T12*  Flexion: R WNL; L WNL  Extension: R WNL; L WNL      Accessory motion: hypomobility B (L>R) GH joint, inferior/posterior. No pain      Strength/MMT: (* denotes performed with pain)  Shoulder Elbow   Flexion: R 3+*/5; L 3+*/5  Abduction: R 3-*/5; L 3+*/5  ER: R 4-/5; L 4/5  IR: R 4/5; L 4/5 Flexion: R 4/5; L 4/5      Special tests:      Scapular Position: R scapulae elevated and protracted as compared to L      Assessment: Within session improvement noted by decreased pain levels and end of session able to perform shoulder flexion symmetrically to functional range, which she was not able to do at start of session. Responding well to isometrics, given as HEP as patient will be out of town next week. All questions answered, reiterating to patient the importance of not over-doing it at home with HEP, pt agreeable.       Goals:  (to be  met in 10 visits)   Pt will report improved ability to sleep without waking due to shoulder pain   Pt will improve bilateral shoulder flexion AROM to >125 degrees to be able to reach into overhead cabinets without pain or restriction   Pt will improve shoulder abduction AROM to >120 degrees to improve ability to don deodorant, don/doff shirts, and wash hair   Pt will increase shoulder AROM ER to T1 to be able to reach and fasten seatbelt   Pt will increase shoulder AROM IR to L1 to be able to reach in back pocket, tuck in shirt, and turn steering wheel without pain  Pt will improve shoulder strength throughout to 4/5 to improve function with cooking and carrying a pot of water.    Pt will be independent and compliant with comprehensive HEP to maintain progress achieved in PT      QuickDASH Outcome Score  Score: 34.09 % (3/4/2024  4:50 PM)    Plan: progress shoulder AROM to tolerance, scapular strength  Date: 3/7/2024  TX#: 2/10 Date:                 TX#: 3/ Date:                 TX#: 4/ Date:                 TX#: 5/ Date:   Tx#: 6/   MT: 15'   R shoulder harmonics, 1'   Gr II inferior GH glide, R/L (pain relieving)   STM L biceps, posterior GH complex       TE: 15'  Pulleys, flexion 2'   Pulleys, scaption, 1'   Supine cane flexion (pain free, full ROM  today) x20  S/L ER (R) x20 1#   Standing Rows, B RTB x15   HEP update, review       NR: 15'   Supine L bicep/tricep isometric to PT x10, 5s hold   Deltoid Isometric- flexion, abduction, extension 2x5 ea R/L 5s hold               HEP: Access Code: [email or web address]  Exercises  - Seated Scapular Retraction  - 1 x daily - 7 x weekly - 2 sets - 10 reps - 3 second hold  - Supine Shoulder Flexion with Dowel to 90  - 1 x daily - 7 x weekly - 2 sets - 10 reps  - Circular Shoulder Pendulum with Table Support  - 1 x daily - 7 x weekly - 2 sets - 10 reps    Access Code: [email or web address] (Date: 03/07/2024)   - Isometric Shoulder Flexion at Wall  - 1 x daily - 7 x weekly - 3 sets - 5 reps - 5  second hold  - Isometric Shoulder Extension at Wall  - 1 x daily - 7 x weekly - 3 sets - 5 reps - 5 second hold  - Isometric Shoulder Abduction at Wall  - 1 x daily - 7 x weekly - 3 sets - 5 reps - 5 second  hold    Charges: 1 Manual, 1 TE, 1 NR       Total Timed Treatment: 45' min  Total Treatment Time: 45' min

## 2024-03-12 ENCOUNTER — APPOINTMENT (OUTPATIENT)
Dept: PHYSICAL THERAPY | Facility: HOSPITAL | Age: 84
End: 2024-03-12
Attending: INTERNAL MEDICINE
Payer: MEDICARE

## 2024-03-19 ENCOUNTER — OFFICE VISIT (OUTPATIENT)
Dept: PHYSICAL THERAPY | Facility: HOSPITAL | Age: 84
End: 2024-03-19
Attending: INTERNAL MEDICINE
Payer: MEDICARE

## 2024-03-19 PROCEDURE — 97110 THERAPEUTIC EXERCISES: CPT

## 2024-03-19 PROCEDURE — 97112 NEUROMUSCULAR REEDUCATION: CPT

## 2024-03-19 PROCEDURE — 97140 MANUAL THERAPY 1/> REGIONS: CPT

## 2024-03-19 NOTE — PROGRESS NOTES
Diagnosis:   Chronic left shoulder pain (M25.512,G89.29)  Tendinopathy (M67.90)    R shoulder pain      Referring Provider: Bryan  Date of Evaluation:    3/4/2024    Precautions:   Next MD visit:   none scheduled  Date of Surgery: n/a   Insurance Primary/Secondary: [employer] / N/A     # Auth Visits: no auth, 10 per POC            Subjective: Has been out of therapy for 10 days because she did not have any transportation but feels that it's going well at home. Sunday was very sore but then was fine by Monday. Still cannot sleep on L side. R arm has not been painful but still limited in mobility    Pain:  2/10 L shoulder      Objective:     Current functional limitations include sleeping on L shoulder, reaching overhead, carry groceries, washing her back, donning/doffing jacket and shirt, and carrying a pot in the kitchen      AROM: (* denotes performed with pain)  Shoulder  Elbow   Flexion: R 125; L 90* (6/10)  Abduction: R 50; L 130   ER: R C6 (slow, cautious); L T1  IR: R L2 ; L T12*  Flexion: R WNL; L WNL  Extension: R WNL; L WNL      Accessory motion: hypomobility B (L>R) GH joint, inferior/posterior. No pain      Strength/MMT: (* denotes performed with pain)  Shoulder Elbow   Flexion: R 3+*/5; L 3+*/5  Abduction: R 3-*/5; L 3+*/5  ER: R 4-/5; L 4/5  IR: R 4/5; L 4/5 Flexion: R 4/5; L 4/5      Special tests:      Scapular Position: R scapulae elevated and protracted as compared to L      Assessment: Pt's L arm limited by pain but R arm presents with decreased mobility and strength. Focusing session on stability component of shoulder complex with rhythmic stabilization and isometric holds. Progressing well with no pain by end of session.       Goals:  (to be met in 10 visits)   Pt will report improved ability to sleep without waking due to shoulder pain   Pt will improve bilateral shoulder flexion AROM to >125 degrees to be able to reach into overhead cabinets without pain or restriction   Pt  will improve shoulder abduction AROM to >120 degrees to improve ability to don deodorant, don/doff shirts, and wash hair   Pt will increase shoulder AROM ER to T1 to be able to reach and fasten seatbelt   Pt will increase shoulder AROM IR to L1 to be able to reach in back pocket, tuck in shirt, and turn steering wheel without pain  Pt will improve shoulder strength throughout to 4/5 to improve function with cooking and carrying a pot of water.    Pt will be independent and compliant with comprehensive HEP to maintain progress achieved in PT      QuickDASH Outcome Score  Score: 34.09 % (3/4/2024  4:50 PM)    Plan: resisted isometric, progress stability at B GH joint  Date: 3/7/2024  TX#: 2/10 Date:   3/19/2024               TX#: 3/10 Date:                 TX#: 4/ Date:                 TX#: 5/ Date:   Tx#: 6/   MT: 15'   R shoulder harmonics, 1'   Gr II inferior GH glide, R/L (pain relieving)   STM L biceps, posterior GH complex MT: 12'  R shoulder harmonics, 1'   Gr II inferior GH glide, R/L (pain relieving)   STM L biceps, posterior GH complex      TE: 15'  Pulleys, flexion 2'   Pulleys, scaption, 1'   Supine cane flexion (pain free, full ROM  today) x20  S/L ER (R) x20 1#   Standing Rows, B RTB x15   HEP update, review TE: 18'   Pulleys, flexion 2'   Pulleys, scaption, 1'   Supine Chest Press 1# 2x10  Supine horizontal abduction, YTB 2x10   Standing Rows, GTB 2x10  Standing Extension, RTB 2x10       NR: 15'   Supine L bicep/tricep isometric to PT x10, 5s hold   Deltoid Isometric- flexion, abduction, extension 2x5 ea R/L 5s hold  NR: 15'  Supine L bicep/tricep isometric to PT x10, 5s hold   Supine IR/ER isometric to PT x10 R/L   Supine rhythmic stabilization, w/ PT 4x10s R/L   Isometric ER walkout, RTB x8 ea (2 step)             HEP: Access Code: [email or web address]  Exercises  - Seated Scapular Retraction  - 1 x daily - 7 x weekly - 2 sets - 10 reps - 3 second hold  - Supine Shoulder Flexion with Dowel to 90  - 1 x daily - 7  x weekly - 2 sets - 10 reps  - Circular Shoulder Pendulum with Table Support  - 1 x daily - 7 x weekly - 2 sets - 10 reps    Access Code: [email or web address] (Date: 03/07/2024)   - Isometric Shoulder Flexion at Wall  - 1 x daily - 7 x weekly - 3 sets - 5 reps - 5 second hold  - Isometric Shoulder Extension at Wall  - 1 x daily - 7 x weekly - 3 sets - 5 reps - 5 second hold  - Isometric Shoulder Abduction at Wall  - 1 x daily - 7 x weekly - 3 sets - 5 reps - 5 second  hold    Charges: 1 Manual, 1 TE, 1 NR       Total Timed Treatment: 45' min  Total Treatment Time: 45' min

## 2024-03-25 ENCOUNTER — OFFICE VISIT (OUTPATIENT)
Dept: PHYSICAL THERAPY | Facility: HOSPITAL | Age: 84
End: 2024-03-25
Attending: INTERNAL MEDICINE
Payer: MEDICARE

## 2024-03-25 PROCEDURE — 97110 THERAPEUTIC EXERCISES: CPT

## 2024-03-25 PROCEDURE — 97112 NEUROMUSCULAR REEDUCATION: CPT

## 2024-03-25 PROCEDURE — 97140 MANUAL THERAPY 1/> REGIONS: CPT

## 2024-03-25 NOTE — PROGRESS NOTES
Diagnosis:   Chronic left shoulder pain (M25.512,G89.29)  Tendinopathy (M67.90)     R shoulder pain       Referring Provider: Bryan  Date of Evaluation:    3/4/2024    Precautions:   Next MD visit:   none scheduled  Date of Surgery: n/a   Insurance Primary/Secondary: [employer] / N/A     # Auth Visits: no auth, 10 per POC             Subjective: Pt reports feeling good, L shoulder givers her more pain than the R, although only has pain sometimes, it is never constant. L shoulder pain can get up to a 3/10.     Pain:  0/10 L shoulder        Objective:      Current functional limitations include sleeping on L shoulder, reaching overhead, carry groceries, washing her back, donning/doffing jacket and shirt, and carrying a pot in the kitchen       AROM: (* denotes performed with pain)  Shoulder  Elbow   Flexion: R 125; L 90* (6/10)  Abduction: R 50; L 130   ER: R C6 (slow, cautious); L T1  IR: R L2 ; L T12*  Flexion: R WNL; L WNL  Extension: R WNL; L WNL      Accessory motion: hypomobility B (L>R) GH joint, inferior/posterior. No pain      Strength/MMT: (* denotes performed with pain)  Shoulder Elbow   Flexion: R 3+*/5; L 3+*/5  Abduction: R 3-*/5; L 3+*/5  ER: R 4-/5; L 4/5  IR: R 4/5; L 4/5 Flexion: R 4/5; L 4/5      Special tests:      Scapular Position: R scapulae elevated and protracted as compared to L        Assessment: Pt's L arm continues to be limited by pain and R arm with decreased mobility and strength. Pt able to perform standing towel slides on wall with R UE without using L UE for assistance. No discomfort/pain noted with SB pushes/perturbations. Pt able to perform standing pec stretch at doorway with ease, noting that she was unable to do so during her first visit, indicating improvement in bilateral shoulder range of motion. No pain at end of session     Goals:  (to be met in 10 visits)   Pt will report improved ability to sleep without waking due to shoulder pain   Pt will improve  bilateral shoulder flexion AROM to >125 degrees to be able to reach into overhead cabinets without pain or restriction   Pt will improve shoulder abduction AROM to >120 degrees to improve ability to don deodorant, don/doff shirts, and wash hair   Pt will increase shoulder AROM ER to T1 to be able to reach and fasten seatbelt   Pt will increase shoulder AROM IR to L1 to be able to reach in back pocket, tuck in shirt, and turn steering wheel without pain  Pt will improve shoulder strength throughout to 4/5 to improve function with cooking and carrying a pot of water.    Pt will be independent and compliant with comprehensive HEP to maintain progress achieved in PT       QuickDASH Outcome Score  Score: 34.09 % (3/4/2024  4:50 PM)     Plan: resisted isometric, progress stability at B GH joint  Date: 3/7/2024  TX#: 2/10 Date:   3/19/2024               TX#: 3/10 Date:  3/25/24               TX#: 4/10 Date:                 TX#: 5/ Date:   Tx#: 6/   MT: 15'   R shoulder harmonics, 1'   Gr II inferior GH glide, R/L (pain relieving)   STM L biceps, posterior GH complex MT: 12'  R shoulder harmonics, 1'   Gr II inferior GH glide, R/L (pain relieving)   STM L biceps, posterior GH complex Manual: 10'  Gr IIl inferior GH glide, R/L   STM L anterior biceps       TE: 15'  Pulleys, flexion 2'   Pulleys, scaption, 1'   Supine cane flexion (pain free, full ROM  today) x20  S/L ER (R) x20 1#   Standing Rows, B RTB x15   HEP update, review TE: 18'   Pulleys, flexion 2'   Pulleys, scaption, 1'   Supine Chest Press 1# 2x10  Supine horizontal abduction, YTB 2x10   Standing Rows, GTB 2x10  Standing Extension, RTB 2x10  TE: 20'  Pulleys, flexion 2'   Pulleys, scaption, 2'   Finger ladder flexion x7  Supine cane flexion AAROM 1# x20  Supine chest press 1# 2x10  Supine horizontal abduction YTB x15  Seated shoulder abduction towel slides x20 ea  Standing flexion towel slides at wall x15 (R)  Doorway pec stretch 2x30\"                   NR: 15'    Supine L bicep/tricep isometric to PT x10, 5s hold   Deltoid Isometric- flexion, abduction, extension 2x5 ea R/L 5s hold  NR: 15'  Supine L bicep/tricep isometric to PT x10, 5s hold   Supine IR/ER isometric to PT x10 R/L   Supine rhythmic stabilization, w/ PT 4x10s R/L   Isometric ER walkout, RTB x8 ea (2 step) NR: 15'  Supine bicep curls with YTB 2x10 ea  Standing flex/ext isometrics to towel 5\" hold x10 ea  SB pushes with perturbations 3x20\" ea                      HEP: Access Code: [email or web address]  Exercises  - Seated Scapular Retraction  - 1 x daily - 7 x weekly - 2 sets - 10 reps - 3 second hold  - Supine Shoulder Flexion with Dowel to 90  - 1 x daily - 7 x weekly - 2 sets - 10 reps  - Circular Shoulder Pendulum with Table Support  - 1 x daily - 7 x weekly - 2 sets - 10 reps     Access Code: [email or web address] (Date: 03/07/2024)   - Isometric Shoulder Flexion at Wall  - 1 x daily - 7 x weekly - 3 sets - 5 reps - 5 second hold  - Isometric Shoulder Extension at Wall  - 1 x daily - 7 x weekly - 3 sets - 5 reps - 5 second hold  - Isometric Shoulder Abduction at Wall  - 1 x daily - 7 x weekly - 3 sets - 5 reps - 5 second  hold     Charges: 1 Manual, 1 TE, 1 NR              Total Timed Treatment: 45' min                          Total Treatment Time: 45' min

## 2024-04-02 ENCOUNTER — OFFICE VISIT (OUTPATIENT)
Dept: PHYSICAL THERAPY | Facility: HOSPITAL | Age: 84
End: 2024-04-02
Attending: INTERNAL MEDICINE
Payer: MEDICARE

## 2024-04-02 PROCEDURE — 97112 NEUROMUSCULAR REEDUCATION: CPT

## 2024-04-02 PROCEDURE — 97140 MANUAL THERAPY 1/> REGIONS: CPT

## 2024-04-02 PROCEDURE — 97110 THERAPEUTIC EXERCISES: CPT

## 2024-04-02 NOTE — PROGRESS NOTES
Diagnosis:   Chronic left shoulder pain (M25.512,G89.29)  Tendinopathy (M67.90)     R shoulder pain       Referring Provider: Bryan  Date of Evaluation:    3/4/2024    Precautions:   Next MD visit:   none scheduled  Date of Surgery: n/a   Insurance Primary/Secondary: [employer] / N/A     # Auth Visits: no auth, 10 per POC             Subjective: Pt came in with reports of constant L shoulder pain, does not seem to go away. Over head reaching, reaching to the side, and cooking elicits pain the most. No pain on R shoulder. Does HEP often, has been going well but does not decrease sxs.     Pain:  3/10 L shoulder beginning of session; 2/10 L shoulder end of session        Objective:      4/2/2024:   Scapular strength assessment (taken in sitting as pt unable to tolerate prone position): * denotes pain with motion  Lower Trap R: 2/5 ; L: 3-/5  Rhomboids R: 2/5; L 3+/5  Middle Trap: 2/5 bilat     Current functional limitations include sleeping on L shoulder, reaching overhead, carry groceries, washing her back, donning/doffing jacket and shirt, and carrying a pot in the kitchen       Taken at Eval:     AROM: (* denotes performed with pain)  Shoulder  Elbow   Flexion: R 125; L 90* (6/10)  Abduction: R 50; L 130   ER: R C6 (slow, cautious); L T1  IR: R L2 ; L T12*  Flexion: R WNL; L WNL  Extension: R WNL; L WNL      Accessory motion: hypomobility B (L>R) GH joint, inferior/posterior. No pain      Strength/MMT: (* denotes performed with pain)  Shoulder Elbow   Flexion: R 3+*/5; L 3+*/5  Abduction: R 3-*/5; L 3+*/5  ER: R 4-/5; L 4/5  IR: R 4/5; L 4/5 Flexion: R 4/5; L 4/5      Special tests:      Scapular Position: R scapulae elevated and protracted as compared to L        Assessment: Pt's L arm continues to be limited by pain and R arm with decreased mobility and strength. Strength testing indicates that pt possesses poor scapular stability, resulting in decreased R shoulder range of motion without pain--  will assess myotome strength next visit to rule out cervical dysfunction. Improved L abduction range of motion noted throughout session, with decreased pain.        Goals:  (to be met in 10 visits)   Pt will report improved ability to sleep without waking due to shoulder pain   Pt will improve bilateral shoulder flexion AROM to >125 degrees to be able to reach into overhead cabinets without pain or restriction   Pt will improve shoulder abduction AROM to >120 degrees to improve ability to don deodorant, don/doff shirts, and wash hair   Pt will increase shoulder AROM ER to T1 to be able to reach and fasten seatbelt   Pt will increase shoulder AROM IR to L1 to be able to reach in back pocket, tuck in shirt, and turn steering wheel without pain  Pt will improve shoulder strength throughout to 4/5 to improve function with cooking and carrying a pot of water.    Pt will be independent and compliant with comprehensive HEP to maintain progress achieved in PT       QuickDASH Outcome Score  Score: 34.09 % (3/4/2024  4:50 PM)     Plan: Abduction ROM, Scapular stability, myotome testing  Date: 3/7/2024  TX#: 2/10 Date:   3/19/2024               TX#: 3/10 Date:  3/25/24               TX#: 4/10 Date:  4/1/2024               TX#: 5/10 Date:   Tx#: 6/   MT: 15'   R shoulder harmonics, 1'   Gr II inferior GH glide, R/L (pain relieving)   STM L biceps, posterior GH complex MT: 12'  R shoulder harmonics, 1'   Gr II inferior GH glide, R/L (pain relieving)   STM L biceps, posterior GH complex Manual: 10'  Gr IIl inferior GH glide, R/L   STM L anterior biceps  MT: 15'  Gr Ill-lV posterior GH glide, R/L   Gr IIl inferior GH glide, R/L   STM L anterior biceps  R/L shoulder harmonics, 1' ea     TE: 15'  Pulleys, flexion 2'   Pulleys, scaption, 1'   Supine cane flexion (pain free, full ROM  today) x20  S/L ER (R) x20 1#   Standing Rows, B RTB x15   HEP update, review TE: 18'   Pulleys, flexion 2'   Pulleys, scaption, 1'   Supine Chest Press  1# 2x10  Supine horizontal abduction, YTB 2x10   Standing Rows, GTB 2x10  Standing Extension, RTB 2x10  TE: 20'  Pulleys, flexion 2'   Pulleys, scaption, 2'   Finger ladder flexion x7  Supine cane flexion AAROM 1# x20  Supine chest press 1# 2x10  Supine horizontal abduction YTB x15  Seated shoulder abduction towel slides x20 ea  Standing flexion towel slides at wall x15 (R)  Doorway pec stretch 2x30\"           TE: 20'  Pulleys, flexion 2'   Pulleys, scaption, 1' (pain L shoulder)  Finger ladder flexion x7 on L  Supine cane flex AAROM 2# 2x10     Supine chest press 2# 2x10     Standing L horizontal abduction YTB x5 (unable on R); without resistance on L x5 (unable on R)     Supine horizontal abduction YTB x5 (difficult on R); no resistance x10 bilat     Scapular strength assessment     HEP Update                 NR: 15'   Supine L bicep/tricep isometric to PT x10, 5s hold   Deltoid Isometric- flexion, abduction, extension 2x5 ea R/L 5s hold  NR: 15'  Supine L bicep/tricep isometric to PT x10, 5s hold   Supine IR/ER isometric to PT x10 R/L   Supine rhythmic stabilization, w/ PT 4x10s R/L   Isometric ER walkout, RTB x8 ea (2 step) NR: 15'  Supine bicep curls with YTB 2x10 ea  Standing flex/ext isometrics to towel 5\" hold x10 ea  SB pushes with perturbations 3x20\" ea     NMR: 10'  Side-lying L shoulder abduction x15  Standing R/L shoulder abduction slides 2x5   Standing Y wall slides x15                    HEP: Access Code: [email or web address]  Exercises  - Seated Scapular Retraction  - 1 x daily - 7 x weekly - 2 sets - 10 reps - 3 second hold  - Supine Shoulder Flexion with Dowel to 90  - 1 x daily - 7 x weekly - 2 sets - 10 reps  - Circular Shoulder Pendulum with Table Support  - 1 x daily - 7 x weekly - 2 sets - 10 reps     Access Code: [email or web address] (Date: 03/07/2024)   - Isometric Shoulder Flexion at Wall  - 1 x daily - 7 x weekly - 3 sets - 5 reps - 5 second hold  - Isometric Shoulder Extension at Wall  - 1 x daily - 7 x weekly -  3 sets - 5 reps - 5 second hold  - Isometric Shoulder Abduction at Wall  - 1 x daily - 7 x weekly - 3 sets - 5 reps - 5 second  hold     Charges: 1 Manual, 1 TE, 1 NR              Total Timed Treatment: 45' min                          Total Treatment Time: 45' min

## 2024-04-09 ENCOUNTER — OFFICE VISIT (OUTPATIENT)
Dept: PHYSICAL THERAPY | Facility: HOSPITAL | Age: 84
End: 2024-04-09
Attending: INTERNAL MEDICINE
Payer: MEDICARE

## 2024-04-09 PROCEDURE — 97110 THERAPEUTIC EXERCISES: CPT

## 2024-04-09 PROCEDURE — 97140 MANUAL THERAPY 1/> REGIONS: CPT

## 2024-04-09 PROCEDURE — 97112 NEUROMUSCULAR REEDUCATION: CPT

## 2024-04-09 RX ORDER — NAPROXEN 500 MG/1
500 TABLET ORAL 2 TIMES DAILY PRN
Qty: 120 TABLET | Refills: 5 | OUTPATIENT
Start: 2024-04-09

## 2024-04-09 NOTE — PROGRESS NOTES
Diagnosis:   Chronic left shoulder pain (M25.512,G89.29)  Tendinopathy (M67.90)     R shoulder pain       Referring Provider: Bryan  Date of Evaluation:    3/4/2024    Precautions:  Cancer, HTN Next MD visit:   none scheduled  Date of Surgery: n/a   Insurance Primary/Secondary: [employer] / N/A     # Auth Visits: no auth, 10 per POC             Subjective: Pt reports feeling slight L shoulder pain, tired to sleep on L side last week and was not able to d/t the pain. R UE limited by inability to perform overhead reaches after 3 times d/t weakness.     Pain:  3/10 L shoulder beginning of session; 0/10 L shoulder end of session        Objective:      4/9/2024:  Myotomal Testing  C3: WNL  C5: weak and tapering off at R UE, weak on L LE  C6: weak and tapering off at R UE, weak on L LE  C7: WNL bilat  C8: WNL bilat  T1: NT           4/2/2024:   Scapular strength assessment (taken in sitting as pt unable to tolerate prone position): * denotes pain with motion  Lower Trap R: 2/5 ; L: 3-/5  Rhomboids R: 2/5; L 3+/5  Middle Trap: 2/5 bilat     Current functional limitations include sleeping on L shoulder, reaching overhead, carry groceries, washing her back, donning/doffing jacket and shirt, and carrying a pot in the kitchen       Taken at Eval:     AROM: (* denotes performed with pain)  Shoulder  Elbow   Flexion: R 125; L 90* (6/10)  Abduction: R 50; L 130   ER: R C6 (slow, cautious); L T1  IR: R L2 ; L T12*  Flexion: R WNL; L WNL  Extension: R WNL; L WNL      Accessory motion: hypomobility B (L>R) GH joint, inferior/posterior. No pain      Strength/MMT: (* denotes performed with pain)  Shoulder Elbow   Flexion: R 3+*/5; L 3+*/5  Abduction: R 3-*/5; L 3+*/5  ER: R 4-/5; L 4/5  IR: R 4/5; L 4/5 Flexion: R 4/5; L 4/5      Special tests:      Scapular Position: R scapulae elevated and protracted as compared to L        Assessment: Pt able to perform resisted supine R shoulder flexion for 10 reps without  fatiguing. Unable to perform again with resistance after extended rest break, able to perform without resistance. Focused session on light strengthening for B UE to which pt tolerated well without pain. Myotomal testing showed weakness/tapering off with R UE C5-C6 myotomes, no cervical pain or impairment and no numbness or tingling throughout R arm to suggest that pt's R UE weakness is neurologic in nature.         Goals:  (to be met in 10 visits)   Pt will report improved ability to sleep without waking due to shoulder pain   Pt will improve bilateral shoulder flexion AROM to >125 degrees to be able to reach into overhead cabinets without pain or restriction   Pt will improve shoulder abduction AROM to >120 degrees to improve ability to don deodorant, don/doff shirts, and wash hair   Pt will increase shoulder AROM ER to T1 to be able to reach and fasten seatbelt   Pt will increase shoulder AROM IR to L1 to be able to reach in back pocket, tuck in shirt, and turn steering wheel without pain  Pt will improve shoulder strength throughout to 4/5 to improve function with cooking and carrying a pot of water.    Pt will be independent and compliant with comprehensive HEP to maintain progress achieved in PT       QuickDASH Outcome Score  Score: 34.09 % (3/4/2024  4:50 PM)     Plan: Abduction ROM, Scapular stability  Date: 3/7/2024  TX#: 2/10 Date:   3/19/2024               TX#: 3/10 Date:  3/25/24               TX#: 4/10 Date:  4/1/2024               TX#: 5/10 Date: 4/9/2024  Tx#: 6/10     MT: 15'   R shoulder harmonics, 1'   Gr II inferior GH glide, R/L (pain relieving)   STM L biceps, posterior GH complex MT: 12'  R shoulder harmonics, 1'   Gr II inferior GH glide, R/L (pain relieving)   STM L biceps, posterior GH complex Manual: 10'  Gr IIl inferior GH glide, R/L   STM L anterior biceps  MT: 15'  Gr Ill-lV posterior GH glide, R/L   Gr IIl inferior GH glide, R/L   STM L anterior biceps  R/L shoulder harmonics, 1' ea  MT:  10 min  Gr Ill-lV posterior GH glide, R/L   STM R rhomboids           TE: 15'  Pulleys, flexion 2'   Pulleys, scaption, 1'   Supine cane flexion (pain free, full ROM  today) x20  S/L ER (R) x20 1#   Standing Rows, B RTB x15   HEP update, review TE: 18'   Pulleys, flexion 2'   Pulleys, scaption, 1'   Supine Chest Press 1# 2x10  Supine horizontal abduction, YTB 2x10   Standing Rows, GTB 2x10  Standing Extension, RTB 2x10  TE: 20'  Pulleys, flexion 2'   Pulleys, scaption, 2'   Finger ladder flexion x7  Supine cane flexion AAROM 1# x20  Supine chest press 1# 2x10  Supine horizontal abduction YTB x15  Seated shoulder abduction towel slides x20 ea  Standing flexion towel slides at wall x15 (R)  Doorway pec stretch 2x30\"           TE: 20'  Pulleys, flexion 2'   Pulleys, scaption, 1' (pain L shoulder)  Finger ladder flexion x7 on L  Supine cane flex AAROM 2# 2x10     Supine chest press 2# 2x10     Standing L horizontal abduction YTB x5 (unable on R); without resistance on L x5 (unable on R)     Supine horizontal abduction YTB x5 (difficult on R); no resistance x10 bilat     Scapular strength assessment     HEP Update     TE: 20 min  Supine shoulder flexion YTB 2x10 L; x10 on R; x10 no resistance on R     Seated cane abduction AAROM x15 ea     ER/IR walkouts YTB x10 ea      Standing rows RTB x5; GTB 2x10     Seated bicep curl 2# x15 ea                    NR: 15'   Supine L bicep/tricep isometric to PT x10, 5s hold   Deltoid Isometric- flexion, abduction, extension 2x5 ea R/L 5s hold  NR: 15'  Supine L bicep/tricep isometric to PT x10, 5s hold   Supine IR/ER isometric to PT x10 R/L   Supine rhythmic stabilization, w/ PT 4x10s R/L   Isometric ER walkout, RTB x8 ea (2 step) NR: 15'  Supine bicep curls with YTB 2x10 ea  Standing flex/ext isometrics to towel 5\" hold x10 ea  SB pushes with perturbations 3x20\" ea     NMR: 10'  Side-lying L shoulder abduction x15  Standing R/L shoulder abduction slides 2x5   Standing Y wall slides  x15     NMR:15 min  Standing Y wall slides  x15  Standing Y wall slides with lift off x10  Standing R/L shoulder abduction slides at wall  Small ball circles cw/ccw x30\" ea  Myotomal assessment                      HEP: Access Code: [email or web address]  Exercises  - Seated Scapular Retraction  - 1 x daily - 7 x weekly - 2 sets - 10 reps - 3 second hold  - Supine Shoulder Flexion with Dowel to 90  - 1 x daily - 7 x weekly - 2 sets - 10 reps  - Circular Shoulder Pendulum with Table Support  - 1 x daily - 7 x weekly - 2 sets - 10 reps     Access Code: [email or web address] (Date: 03/07/2024)   - Isometric Shoulder Flexion at Wall  - 1 x daily - 7 x weekly - 3 sets - 5 reps - 5 second hold  - Isometric Shoulder Extension at Wall  - 1 x daily - 7 x weekly - 3 sets - 5 reps - 5 second hold  - Isometric Shoulder Abduction at Wall  - 1 x daily - 7 x weekly - 3 sets - 5 reps - 5 second  hold     Charges: 1 Manual, 1 TE, 1 NR              Total Timed Treatment: 45' min                          Total Treatment Time: 45' min

## 2024-04-16 ENCOUNTER — OFFICE VISIT (OUTPATIENT)
Dept: PHYSICAL THERAPY | Facility: HOSPITAL | Age: 84
End: 2024-04-16
Attending: INTERNAL MEDICINE
Payer: MEDICARE

## 2024-04-16 PROCEDURE — 97112 NEUROMUSCULAR REEDUCATION: CPT

## 2024-04-16 PROCEDURE — 97140 MANUAL THERAPY 1/> REGIONS: CPT

## 2024-04-16 PROCEDURE — 97110 THERAPEUTIC EXERCISES: CPT

## 2024-04-16 NOTE — PROGRESS NOTES
Diagnosis:   Chronic left shoulder pain (M25.512,G89.29)  Tendinopathy (M67.90)     R shoulder pain       Referring Provider: Bryan  Date of Evaluation:    3/4/2024    Precautions:  Cancer, HTN Next MD visit:   none scheduled  Date of Surgery: n/a   Insurance Primary/Secondary: [employer] / N/A     # Auth Visits: no auth, 10 per POC             Subjective: Pt reports feeling okay, still has L shoulder pain, the worst her pain gets to is a 4-5/10 when reaching behind back or lifting arm overhead to wash her hair. R UE still limited in mobility, but pt is now able to reach overhead to style her hair with R arm. Pt was able to garden for 3 hours yesterday without limitations. Pt states that physical therapy has been helping her, and would like to have more visits before seeing MD again for discussion of other treatment options.     Pain:  2/10 L shoulder beginning of session; 1/10 L shoulder end of session        Objective:      4/9/2024:  Myotomal Testing  C3: WNL  C5: weak and tapering off at R UE, weak on L LE  C6: weak and tapering off at R UE, weak on L LE  C7: WNL bilat  C8: WNL bilat  T1: NT           4/2/2024:   Scapular strength assessment (taken in sitting as pt unable to tolerate prone position): * denotes pain with motion  Lower Trap R: 2/5 ; L: 3-/5  Rhomboids R: 2/5; L 3+/5  Middle Trap: 2/5 bilat     Current functional limitations include sleeping on L shoulder, reaching overhead, carry groceries, washing her back, donning/doffing jacket and shirt, and carrying a pot in the kitchen       Taken at Eval:     AROM: (* denotes performed with pain)  Shoulder  Elbow   Flexion: R 125; L 90* (6/10)  Abduction: R 50; L 130   ER: R C6 (slow, cautious); L T1  IR: R L2 ; L T12*  Flexion: R WNL; L WNL  Extension: R WNL; L WNL      Accessory motion: hypomobility B (L>R) GH joint, inferior/posterior. No pain      Strength/MMT: (* denotes performed with pain)  Shoulder Elbow   Flexion: R 3+*/5; L  3+*/5  Abduction: R 3-*/5; L 3+*/5  ER: R 4-/5; L 4/5  IR: R 4/5; L 4/5 Flexion: R 4/5; L 4/5      Special tests:      Scapular Position: R scapulae elevated and protracted as compared to L        Assessment: Pt able to perform resisted supine B shoulder horizontal abduction without fatiguing in R shoulder or increased pain in L shoulder. Decreased L shoulder pain noted after joint mobilizations along with increased PROM in all directions with better tolerability at end range abduction, external rotation, and flexion. L shoulder ER walkouts elicited slight L shoulder pain, required less walk out resistance.         Goals:  (to be met in 10 visits)   Pt will report improved ability to sleep without waking due to shoulder pain   Pt will improve bilateral shoulder flexion AROM to >125 degrees to be able to reach into overhead cabinets without pain or restriction   Pt will improve shoulder abduction AROM to >120 degrees to improve ability to don deodorant, don/doff shirts, and wash hair   Pt will increase shoulder AROM ER to T1 to be able to reach and fasten seatbelt   Pt will increase shoulder AROM IR to L1 to be able to reach in back pocket, tuck in shirt, and turn steering wheel without pain  Pt will improve shoulder strength throughout to 4/5 to improve function with cooking and carrying a pot of water.    Pt will be independent and compliant with comprehensive HEP to maintain progress achieved in PT       QuickDASH Outcome Score  Score: 34.09 % (3/4/2024  4:50 PM)     Plan: Abduction ROM, Scapular stability  Date: 3/7/2024  TX#: 2/10 Date:   3/19/2024               TX#: 3/10 Date:  3/25/24               TX#: 4/10 Date:  4/1/2024               TX#: 5/10 Date: 4/9/2024  Tx#: 6/10  Date: 4/16/2024  Tx#: 7/10     MT: 15'   R shoulder harmonics, 1'   Gr II inferior GH glide, R/L (pain relieving)   STM L biceps, posterior GH complex MT: 12'  R shoulder harmonics, 1'   Gr II inferior GH glide, R/L (pain relieving)   STM L  biceps, posterior GH complex Manual: 10'  Gr IIl inferior GH glide, R/L   STM L anterior biceps  MT: 15'  Gr Ill-lV posterior GH glide, R/L   Gr IIl inferior GH glide, R/L   STM L anterior biceps  R/L shoulder harmonics, 1' ea  MT: 10 min  Gr Ill-lV posterior GH glide, R/L   STM R rhomboids        MT: 15 min  Gr Ill posterior GH glide, L   Gr lll inferior GH glide, L  PROM all directions        TE: 15'  Pulleys, flexion 2'   Pulleys, scaption, 1'   Supine cane flexion (pain free, full ROM  today) x20  S/L ER (R) x20 1#   Standing Rows, B RTB x15   HEP update, review TE: 18'   Pulleys, flexion 2'   Pulleys, scaption, 1'   Supine Chest Press 1# 2x10  Supine horizontal abduction, YTB 2x10   Standing Rows, GTB 2x10  Standing Extension, RTB 2x10  TE: 20'  Pulleys, flexion 2'   Pulleys, scaption, 2'   Finger ladder flexion x7  Supine cane flexion AAROM 1# x20  Supine chest press 1# 2x10  Supine horizontal abduction YTB x15  Seated shoulder abduction towel slides x20 ea  Standing flexion towel slides at wall x15 (R)  Doorway pec stretch 2x30\"           TE: 20'  Pulleys, flexion 2'   Pulleys, scaption, 1' (pain L shoulder)  Finger ladder flexion x7 on L  Supine cane flex AAROM 2# 2x10     Supine chest press 2# 2x10     Standing L horizontal abduction YTB x5 (unable on R); without resistance on L x5 (unable on R)     Supine horizontal abduction YTB x5 (difficult on R); no resistance x10 bilat     Scapular strength assessment     HEP Update     TE: 20 min  Supine shoulder flexion YTB 2x10 L; x10 on R; x10 no resistance on R     Seated cane abduction AAROM x15 ea     ER/IR walkouts YTB x10 ea      Standing rows RTB x5; GTB 2x10     Seated bicep curl 2# x15 ea           TE: 20min  Pulleys, flexion 2'      Finger ladder abduction on R x7     ER/IR walkouts YTB x10 ea      Supine horizontal abduction x10; x10 with YTB     Supine bicep curls with YTB x15 ea                             NR: 15'   Supine L bicep/tricep isometric to  PT x10, 5s hold   Deltoid Isometric- flexion, abduction, extension 2x5 ea R/L 5s hold  NR: 15'  Supine L bicep/tricep isometric to PT x10, 5s hold   Supine IR/ER isometric to PT x10 R/L   Supine rhythmic stabilization, w/ PT 4x10s R/L   Isometric ER walkout, RTB x8 ea (2 step) NR: 15'  Supine bicep curls with YTB 2x10 ea  Standing flex/ext isometrics to towel 5\" hold x10 ea  SB pushes with perturbations 3x20\" ea     NMR: 10'  Side-lying L shoulder abduction x15  Standing R/L shoulder abduction slides 2x5   Standing Y wall slides x15     NMR:15 min  Standing Y wall slides  x15  Standing Y wall slides with lift off x10  Standing R/L shoulder abduction slides at wall  Small ball circles cw/ccw x30\" ea  Myotomal assessment     NMR: 10 min  Standing Y slides with lift off at wall x10  Seated cane R abduction AAROM x15  Seated table shoulder abduction slides on R x15                     HEP: Access Code: [email or web address]  Exercises  - Seated Scapular Retraction  - 1 x daily - 7 x weekly - 2 sets - 10 reps - 3 second hold  - Supine Shoulder Flexion with Dowel to 90  - 1 x daily - 7 x weekly - 2 sets - 10 reps  - Circular Shoulder Pendulum with Table Support  - 1 x daily - 7 x weekly - 2 sets - 10 reps     Access Code: [email or web address] (Date: 03/07/2024)   - Isometric Shoulder Flexion at Wall  - 1 x daily - 7 x weekly - 3 sets - 5 reps - 5 second hold  - Isometric Shoulder Extension at Wall  - 1 x daily - 7 x weekly - 3 sets - 5 reps - 5 second hold  - Isometric Shoulder Abduction at Wall  - 1 x daily - 7 x weekly - 3 sets - 5 reps - 5 second  hold     Charges: 1 Manual, 1 TE, 1 NR              Total Timed Treatment: 45' min                          Total Treatment Time: 45' mi

## 2024-04-26 ENCOUNTER — OFFICE VISIT (OUTPATIENT)
Dept: PHYSICAL THERAPY | Facility: HOSPITAL | Age: 84
End: 2024-04-26
Attending: INTERNAL MEDICINE
Payer: MEDICARE

## 2024-04-26 PROCEDURE — 97110 THERAPEUTIC EXERCISES: CPT

## 2024-04-26 PROCEDURE — 97140 MANUAL THERAPY 1/> REGIONS: CPT

## 2024-04-26 NOTE — PROGRESS NOTES
Diagnosis:   Chronic left shoulder pain (M25.512,G89.29)  Tendinopathy (M67.90)     R shoulder pain       Referring Provider: Bryan  Date of Evaluation:    3/4/2024    Precautions:  Cancer, HTN Next MD visit:   none scheduled  Date of Surgery: n/a   Insurance Primary/Secondary: [employer] / N/A     # Auth Visits: no auth, 10 per POC             Subjective: L arm continues to feel better, can sleep on her L shoulder for about 10 minutes but then has to roll on her back or R side. Still has some L shoulder pain on and off and occasionally gets tingling into LUE when walking, when arm is in dependent position, goes away when distracted and active.      Pain:  2/10 L shoulder beginning of session; 0/10 L shoulder end of session        Objective:      4/9/2024:  Myotomal Testing  C3: WNL  C5: weak and tapering off at R UE, weak on L LE  C6: weak and tapering off at R UE, weak on L LE  C7: WNL bilat  C8: WNL bilat  T1: NT           4/2/2024:   Scapular strength assessment (taken in sitting as pt unable to tolerate prone position): * denotes pain with motion  Lower Trap R: 2/5 ; L: 3-/5  Rhomboids R: 2/5; L 3+/5  Middle Trap: 2/5 bilat     Taken at Eval:     AROM: (* denotes performed with pain)  Shoulder  Elbow   Flexion: R 125; L 90* (6/10)  Abduction: R 50; L 130   ER: R C6 (slow, cautious); L T1  IR: R L2 ; L T12*  Flexion: R WNL; L WNL  Extension: R WNL; L WNL      Accessory motion: hypomobility B (L>R) GH joint, inferior/posterior. No pain      Strength/MMT: (* denotes performed with pain)  Shoulder Elbow   Flexion: R 3+*/5; L 3+*/5  Abduction: R 3-*/5; L 3+*/5  ER: R 4-/5; L 4/5  IR: R 4/5; L 4/5 Flexion: R 4/5; L 4/5      Special tests:    Scapular Position: R scapulae elevated and protracted as compared to L        Assessment: Improving ROM in L shoulder, near functional range with minimal pain. R arm continues to be weak into shoulder abduction, still unable to perform full motion in gravity  minimized but getting near full PROM. Reports of N/T in L hand suggestive of possible nerve involvement, consider assessment in future session .        Goals:  (to be met in 10 visits)   Pt will report improved ability to sleep without waking due to shoulder pain   Pt will improve bilateral shoulder flexion AROM to >125 degrees to be able to reach into overhead cabinets without pain or restriction   Pt will improve shoulder abduction AROM to >120 degrees to improve ability to don deodorant, don/doff shirts, and wash hair   Pt will increase shoulder AROM ER to T1 to be able to reach and fasten seatbelt   Pt will increase shoulder AROM IR to L1 to be able to reach in back pocket, tuck in shirt, and turn steering wheel without pain  Pt will improve shoulder strength throughout to 4/5 to improve function with cooking and carrying a pot of water.    Pt will be independent and compliant with comprehensive HEP to maintain progress achieved in PT       QuickDASH Outcome Score  Score: 34.09 % (3/4/2024  4:50 PM)     Plan: Abduction ROM, Scapular stability  Date: 3/7/2024  TX#: 2/10 Date:   3/19/2024               TX#: 3/10 Date:  3/25/24               TX#: 4/10 Date:  4/1/2024               TX#: 5/10 Date: 4/9/2024  Tx#: 6/10  Date: 4/16/2024  Tx#: 7/10  Date: 4/26/2024   Tx: 8/10   MT: 15'   R shoulder harmonics, 1'   Gr II inferior GH glide, R/L (pain relieving)   STM L biceps, posterior GH complex MT: 12'  R shoulder harmonics, 1'   Gr II inferior GH glide, R/L (pain relieving)   STM L biceps, posterior GH complex Manual: 10'  Gr IIl inferior GH glide, R/L   STM L anterior biceps  MT: 15'  Gr Ill-lV posterior GH glide, R/L   Gr IIl inferior GH glide, R/L   STM L anterior biceps  R/L shoulder harmonics, 1' ea  MT: 10 min  Gr Ill-lV posterior GH glide, R/L   STM R rhomboids        MT: 15 min  Gr Ill posterior GH glide, L   Gr lll inferior GH glide, L  PROM all directions     MT: 8 min  Gr Ill posterior GH glide, L   Gr lll  inferior GH glide, L  PROM all directions   TE: 15'  Pulleys, flexion 2'   Pulleys, scaption, 1'   Supine cane flexion (pain free, full ROM  today) x20  S/L ER (R) x20 1#   Standing Rows, B RTB x15   HEP update, review TE: 18'   Pulleys, flexion 2'   Pulleys, scaption, 1'   Supine Chest Press 1# 2x10  Supine horizontal abduction, YTB 2x10   Standing Rows, GTB 2x10  Standing Extension, RTB 2x10  TE: 20'  Pulleys, flexion 2'   Pulleys, scaption, 2'   Finger ladder flexion x7  Supine cane flexion AAROM 1# x20  Supine chest press 1# 2x10  Supine horizontal abduction YTB x15  Seated shoulder abduction towel slides x20 ea  Standing flexion towel slides at wall x15 (R)  Doorway pec stretch 2x30\"           TE: 20'  Pulleys, flexion 2'   Pulleys, scaption, 1' (pain L shoulder)  Finger ladder flexion x7 on L  Supine cane flex AAROM 2# 2x10     Supine chest press 2# 2x10     Standing L horizontal abduction YTB x5 (unable on R); without resistance on L x5 (unable on R)     Supine horizontal abduction YTB x5 (difficult on R); no resistance x10 bilat     Scapular strength assessment     HEP Update     TE: 20 min  Supine shoulder flexion YTB 2x10 L; x10 on R; x10 no resistance on R     Seated cane abduction AAROM x15 ea     ER/IR walkouts YTB x10 ea      Standing rows RTB x5; GTB 2x10     Seated bicep curl 2# x15 ea           TE: 20min  Pulleys, flexion 2'      Finger ladder abduction on R x7     ER/IR walkouts YTB x10 ea      Supine horizontal abduction x10; x10 with YTB     Supine bicep curls with YTB x15 ea                TE: 32'    Pulleys, flexion ,abduction  2'     Supine cane flexion 3# x20     Supine chest press, 3# x20     Supine horizontal abduction x20 YTB     Standing abduction slides, bolster on table x20    IR walkouts YTB x10 ea     Standing rows, GTB B x20    Standing sh extension RTB B x20     NR: 15'   Supine L bicep/tricep isometric to PT x10, 5s hold   Deltoid Isometric- flexion, abduction, extension 2x5 ea R/L  5s hold  NR: 15'  Supine L bicep/tricep isometric to PT x10, 5s hold   Supine IR/ER isometric to PT x10 R/L   Supine rhythmic stabilization, w/ PT 4x10s R/L   Isometric ER walkout, RTB x8 ea (2 step) NR: 15'  Supine bicep curls with YTB 2x10 ea  Standing flex/ext isometrics to towel 5\" hold x10 ea  SB pushes with perturbations 3x20\" ea     NMR: 10'  Side-lying L shoulder abduction x15  Standing R/L shoulder abduction slides 2x5   Standing Y wall slides x15     NMR:15 min  Standing Y wall slides  x15  Standing Y wall slides with lift off x10  Standing R/L shoulder abduction slides at wall  Small ball circles cw/ccw x30\" ea  Myotomal assessment     NMR: 10 min  Standing Y slides with lift off at wall x10  Seated cane R abduction AAROM x15  Seated table shoulder abduction slides on R x15                     HEP: Access Code: [email or web address]  Exercises  - Seated Scapular Retraction  - 1 x daily - 7 x weekly - 2 sets - 10 reps - 3 second hold  - Supine Shoulder Flexion with Dowel to 90  - 1 x daily - 7 x weekly - 2 sets - 10 reps  - Circular Shoulder Pendulum with Table Support  - 1 x daily - 7 x weekly - 2 sets - 10 reps     Access Code: [email or web address] (Date: 03/07/2024)   - Isometric Shoulder Flexion at Wall  - 1 x daily - 7 x weekly - 3 sets - 5 reps - 5 second hold  - Isometric Shoulder Extension at Wall  - 1 x daily - 7 x weekly - 3 sets - 5 reps - 5 second hold  - Isometric Shoulder Abduction at Wall  - 1 x daily - 7 x weekly - 3 sets - 5 reps - 5 second  hold     Charges: 1 Manual, 2 TE            Total Timed Treatment: 40' min                          Total Treatment Time: 40' mi

## 2024-05-07 DIAGNOSIS — I10 ESSENTIAL HYPERTENSION: ICD-10-CM

## 2024-05-07 RX ORDER — ENALAPRIL MALEATE 2.5 MG/1
2.5 TABLET ORAL DAILY
Qty: 100 TABLET | Refills: 2 | Status: SHIPPED | OUTPATIENT
Start: 2024-05-07

## 2024-05-09 DIAGNOSIS — E78.00 HYPERCHOLESTEREMIA: ICD-10-CM

## 2024-05-09 RX ORDER — ATORVASTATIN CALCIUM 10 MG/1
TABLET, FILM COATED ORAL
Qty: 50 TABLET | Refills: 2 | Status: SHIPPED | OUTPATIENT
Start: 2024-05-09

## 2024-05-09 NOTE — TELEPHONE ENCOUNTER
Care Management Discharge Note    Discharge Date: 02/12/2023       Discharge Disposition: Home with family    Discharge Services:  Resuming PCA services     Discharge Transportation: family or friend will provide    Education Provided on the Discharge Plan:  AVS per bedside RN    Persons Notified of Discharge Plans: patient and daughter    Patient/Family in Agreement with the Plan: yes    Handoff Referral Completed: Yes    Additional Information:  Chart reviewed. CM spoke to Idalmis (daughter) via phone. Idalmis (daughter) confirmed that patient has home PCA services provided by [facility] ([facility]). CM called to confirm services with [facility] ([facility]). PCA services will be resumed once patient has been discharged from the hospital. [facility] ([facility]) stated they do not need any new orders. Family will provide transportation home at time of hospital discharge.     Home Care Referrals for nursing pending  [facility] referral sent per protocol.     Faith Dumont RN         Future Appt. 08/28/2024    Last Visit: 02/28/2024    Medication Requested:  Requested Prescriptions     Pending Prescriptions Disp Refills    ATORVASTATIN 10 MG Oral Tab [Pharmacy Med Name: Atorvastatin Calcium 10 MG Oral Tablet] 50 tablet 2     Sig: TOME MEDIA TABLETA POR LA JOHN RAO NOCLINO        Last refill:        Disp Refills Start End     atorvastatin 10 MG Oral Tab 45 tablet 1 2/28/2024 --    Sig - Route: Take 0.5 tablets (5 mg total) by mouth nightly. - Oral      Cholesterol Medication Protocol [id number]/09/2024 05:38 AM   Protocol Details ALT < 80    ALT resulted within past year    Lipid panel within past 12 months    In person appointment or virtual visit in the past 12 mos or appointment in next 3 mos

## 2024-05-13 ENCOUNTER — OFFICE VISIT (OUTPATIENT)
Dept: PHYSICAL THERAPY | Facility: HOSPITAL | Age: 84
End: 2024-05-13
Attending: INTERNAL MEDICINE
Payer: MEDICARE

## 2024-05-13 PROCEDURE — 97110 THERAPEUTIC EXERCISES: CPT

## 2024-05-13 PROCEDURE — 97140 MANUAL THERAPY 1/> REGIONS: CPT

## 2024-05-13 NOTE — PROGRESS NOTES
Diagnosis:   Chronic left shoulder pain (M25.512,G89.29)  Tendinopathy (M67.90)     R shoulder pain       Referring Provider: Bryan  Date of Evaluation:    3/4/2024    Precautions:  Cancer, HTN Next MD visit:   none scheduled  Date of Surgery: n/a   Insurance Primary/Secondary: [employer] MEDICARE / N/A     # Auth Visits: no auth, 10 per POC             Subjective: Feels she is about the same but maybe a little better than evaluation. Can sleep on L side a little easier but still gets pain first thing in the morning.      Pain:  2/10 L shoulder beginning of session; 0/10 L shoulder end of session        Objective:       4/2/2024:   Scapular strength assessment (taken in sitting as pt unable to tolerate prone position): * denotes pain with motion  Lower Trap R: 2/5 ; L: 3-/5  Rhomboids R: 2/5; L 3+/5  Middle Trap: 2/5 bilat     Taken at Eval:     Shoulder AROM: (* denotes performed with pain)  Evaluation  5/13/2024    Flexion: R 125; L 90* (6/10)  Abduction: R 50; L 130   ER: R C6 (slow, cautious); L T1  IR: R L2 ; L T12*  Flexion: R 140; L 140  Abduction: R 85; L 130   ER: R C7; L T1  IR: R T10 ; L T8      Accessory motion: hypomobility B (L>R) GH joint, inferior/posterior. No pain      Strength/MMT: (* denotes performed with pain)  Shoulder Elbow   Flexion: R 3+*/5; L 3+*/5  Abduction: R 3-*/5; L 3+*/5  ER: R 4-/5; L 4/5  IR: R 4/5; L 4/5 Flexion: R 4/5; L 4/5      Special tests:    Scapular Position: R scapulae elevated and protracted as compared to L        Assessment: Reassessment at AROM demos notable improvement at R and L shoulder; no pain with movements in session that had previously been limited. Updating HEP in session to focus on strength training; pt able to perform independently and with good form; will reassess at next session to determine POC.         Goals:  (to be met in 10 visits)   Pt will report improved ability to sleep without waking due to shoulder pain   Pt will improve bilateral  shoulder flexion AROM to >125 degrees to be able to reach into overhead cabinets without pain or restriction Met   Pt will improve shoulder abduction AROM to >120 degrees to improve ability to don deodorant, don/doff shirts, and wash hair Met   Pt will increase shoulder AROM ER to T1 to be able to reach and fasten seatbelt Met   Pt will increase shoulder AROM IR to L1 to be able to reach in back pocket, tuck in shirt, and turn steering wheel without pain Met  Pt will improve shoulder strength throughout to 4/5 to improve function with cooking and carrying a pot of water.    Pt will be independent and compliant with comprehensive HEP to maintain progress achieved in PT       QuickDASH Outcome Score  Score: 34.09 % (3/4/2024  4:50 PM)     Plan: Tentative plan to D/C   Date: 3/7/2024  TX#: 2/10 Date:   3/19/2024               TX#: 3/10 Date:  3/25/24               TX#: 4/10 Date:  4/1/2024               TX#: 5/10 Date: 4/9/2024  Tx#: 6/10  Date: 4/16/2024  Tx#: 7/10  Date: 4/26/2024   Tx: 8/10 Date: 5/13/2024   Tx; 9/10   MT: 15'   R shoulder harmonics, 1'   Gr II inferior GH glide, R/L (pain relieving)   STM L biceps, posterior GH complex MT: 12'  R shoulder harmonics, 1'   Gr II inferior GH glide, R/L (pain relieving)   STM L biceps, posterior GH complex Manual: 10'  Gr IIl inferior GH glide, R/L   STM L anterior biceps  MT: 15'  Gr Ill-lV posterior GH glide, R/L   Gr IIl inferior GH glide, R/L   STM L anterior biceps  R/L shoulder harmonics, 1' ea  MT: 10 min  Gr Ill-lV posterior GH glide, R/L   STM R rhomboids        MT: 15 min  Gr Ill posterior GH glide, L   Gr lll inferior GH glide, L  PROM all directions     MT: 8 min  Gr Ill posterior GH glide, L   Gr lll inferior GH glide, L  PROM all directions MT: 8 min  Gr Ill posterior GH glide, L   Gr lll inferior GH glide, L  PROM all directions   TE: 15'  Pulleys, flexion 2'   Pulleys, scaption, 1'   Supine cane flexion (pain free, full ROM  today) x20  S/L ER (R) x20  1#   Standing Rows, B RTB x15   HEP update, review TE: 18'   Pulleys, flexion 2'   Pulleys, scaption, 1'   Supine Chest Press 1# 2x10  Supine horizontal abduction, YTB 2x10   Standing Rows, GTB 2x10  Standing Extension, RTB 2x10  TE: 20'  Pulleys, flexion 2'   Pulleys, scaption, 2'   Finger ladder flexion x7  Supine cane flexion AAROM 1# x20  Supine chest press 1# 2x10  Supine horizontal abduction YTB x15  Seated shoulder abduction towel slides x20 ea  Standing flexion towel slides at wall x15 (R)  Doorway pec stretch 2x30\"           TE: 20'  Pulleys, flexion 2'   Pulleys, scaption, 1' (pain L shoulder)  Finger ladder flexion x7 on L  Supine cane flex AAROM 2# 2x10     Supine chest press 2# 2x10     Standing L horizontal abduction YTB x5 (unable on R); without resistance on L x5 (unable on R)     Supine horizontal abduction YTB x5 (difficult on R); no resistance x10 bilat     Scapular strength assessment     HEP Update     TE: 20 min  Supine shoulder flexion YTB 2x10 L; x10 on R; x10 no resistance on R     Seated cane abduction AAROM x15 ea     ER/IR walkouts YTB x10 ea      Standing rows RTB x5; GTB 2x10     Seated bicep curl 2# x15 ea           TE: 20min  Pulleys, flexion 2'      Finger ladder abduction on R x7     ER/IR walkouts YTB x10 ea      Supine horizontal abduction x10; x10 with YTB     Supine bicep curls with YTB x15 ea                TE: 32'    Pulleys, flexion ,abduction  2'     Supine cane flexion 3# x20     Supine chest press, 3# x20     Supine horizontal abduction x20 YTB     Standing abduction slides, bolster on table x20    IR walkouts YTB x10 ea     Standing rows, GTB B x20    Standing sh extension RTB B x20   TE: 32'    Pulleys, flexion ,abduction  2'     Reassessment    Supine cane flexion 3# x20     Supine chest press, 3# x20    Standing IR, YTB R/L x20ea     Standing ER YTB R/L x20ea     Standing Rows, YTB 2x10     HEP update,review     NR: 15'   Supine L bicep/tricep isometric to PT x10, 5s  hold   Deltoid Isometric- flexion, abduction, extension 2x5 ea R/L 5s hold  NR: 15'  Supine L bicep/tricep isometric to PT x10, 5s hold   Supine IR/ER isometric to PT x10 R/L   Supine rhythmic stabilization, w/ PT 4x10s R/L   Isometric ER walkout, RTB x8 ea (2 step) NR: 15'  Supine bicep curls with YTB 2x10 ea  Standing flex/ext isometrics to towel 5\" hold x10 ea  SB pushes with perturbations 3x20\" ea     NMR: 10'  Side-lying L shoulder abduction x15  Standing R/L shoulder abduction slides 2x5   Standing Y wall slides x15     NMR:15 min  Standing Y wall slides  x15  Standing Y wall slides with lift off x10  Standing R/L shoulder abduction slides at wall  Small ball circles cw/ccw x30\" ea  Myotomal assessment     NMR: 10 min  Standing Y slides with lift off at wall x10  Seated cane R abduction AAROM x15  Seated table shoulder abduction slides on R x15                       HEP:   Access Code: [email or web address]  URL: [email or web address]  Date: 05/13/2024  Prepared by: Lauryn Webb    Exercises  - Supine Shoulder Flexion Extension AAROM with Dowel  - 1 x daily - 7 x weekly - 2 sets - 10 reps  - Shoulder Internal Rotation with Resistance  - 1 x daily - 7 x weekly - 2 sets - 10 reps  - Shoulder External Rotation with Anchored Resistance  - 1 x daily - 7 x weekly - 2 sets - 10 reps  - Low Trap Setting at Wall  - 1 x daily - 7 x weekly - 3 sets - 5 reps  - Standing Shoulder Row with Anchored Resistance  - 1 x daily - 7 x weekly - 2 sets - 10 reps    Charges: 1 Manual, 2 TE            Total Timed Treatment: 40' min                          Total Treatment Time: 40' mi

## 2024-05-20 ENCOUNTER — PATIENT OUTREACH (OUTPATIENT)
Dept: CASE MANAGEMENT | Age: 84
End: 2024-05-20

## 2024-05-20 ENCOUNTER — APPOINTMENT (OUTPATIENT)
Dept: PHYSICAL THERAPY | Facility: HOSPITAL | Age: 84
End: 2024-05-20
Attending: INTERNAL MEDICINE
Payer: MEDICARE

## 2024-05-24 ENCOUNTER — PATIENT OUTREACH (OUTPATIENT)
Dept: CASE MANAGEMENT | Age: 84
End: 2024-05-24

## 2024-05-24 ENCOUNTER — TELEPHONE (OUTPATIENT)
Dept: INTERNAL MEDICINE CLINIC | Facility: CLINIC | Age: 84
End: 2024-05-24

## 2024-05-24 DIAGNOSIS — G89.29 CHRONIC PAIN OF BOTH SHOULDERS: ICD-10-CM

## 2024-05-24 DIAGNOSIS — Z85.828 HISTORY OF BASAL CELL CARCINOMA: ICD-10-CM

## 2024-05-24 DIAGNOSIS — I10 ESSENTIAL HYPERTENSION: ICD-10-CM

## 2024-05-24 DIAGNOSIS — M25.511 CHRONIC PAIN OF BOTH SHOULDERS: ICD-10-CM

## 2024-05-24 DIAGNOSIS — Z85.3 PERSONAL HISTORY OF BREAST CANCER: Primary | ICD-10-CM

## 2024-05-24 DIAGNOSIS — E11.3293 CONTROLLED TYPE 2 DIABETES MELLITUS WITH BOTH EYES AFFECTED BY MILD NONPROLIFERATIVE RETINOPATHY WITHOUT MACULAR EDEMA, WITHOUT LONG-TERM CURRENT USE OF INSULIN (HCC): Primary | ICD-10-CM

## 2024-05-24 DIAGNOSIS — Z90.11 STATUS POST RIGHT MASTECTOMY: ICD-10-CM

## 2024-05-24 DIAGNOSIS — M25.512 CHRONIC PAIN OF BOTH SHOULDERS: ICD-10-CM

## 2024-05-24 DIAGNOSIS — Z85.3 PERSONAL HISTORY OF BREAST CANCER: ICD-10-CM

## 2024-05-24 NOTE — TELEPHONE ENCOUNTER
Tommy Adams MD   to Me       5/24/24 11:34 AM  Have her call office please for triage and set up appt if needed    Referral was signed and approved, patient informed. Patient was also asked to contact PCP's office for further assistance on her cramps, patient agreed to call the office.

## 2024-05-24 NOTE — TELEPHONE ENCOUNTER
Called made to patient for CCM.   Patient is in need of a referral to see dermatologist for yearly skin check up, per last OV from Dr Cox she needs to have yearly skin check up's, she hasn't seen one since 2020 and has Hx of basal cell carcinoma.    Patient reported cramps, she continues with them even though she is using b12 complex as a supplement and adding potassium to her diet ( as recommended by PCP) she would like to know what else can be recommended to help subside her symptoms.          AUG 28  2024 10:00 AM - Follow Up Visit  [facility], [facility] Tommy Adams MD

## 2024-05-24 NOTE — PROGRESS NOTES
Spoke to Gilma at length about CCM, current care plan and performed CCM call with Gilma reviewed meds and compliance.     Interventions for following categories based on CCM initial call.      I want to know a little about you.  What do you do in your spare time? Now that the weather is nice patient enjoys gardening and likes to go for walks around the house.  Patient loves to do puzzles as a hobby.  Are you retired or what do you do for a living? 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    Social support:  Do you live with someone? Patient lives with her .  Do you have someone you can turn to when you are very stressed and or need help? Yes   Who? Her  and daughter.  Do you have someone you check in with or talk to on a regular basis? Yes  Are you responsible for anyone's care? No      Let's talk about stress.  How much stress do you feel you have in your life? Patient feels very minimal to no stress at all  How do you manage this stress? Patient loves to read for 3-4 hours daily to not think on stress.    Nutrition: Let's talk about eating habits  Any special diet you are put on?  Patient tries to follow a diabetic diet.  Do you have any barriers to keeping with this diet? No  Do you eat three small meals a day? yes  Do you eat a variety of fruits and veggies? Yes, she grows her own for the most part.  How do you stay hydrated? By drinking water and fruit smoothies based on water.  Daily Menu?  Patient prefer to eat fruits and vegetables but for now is refraining from lettuce/salads because is causing her diarrhea.  She eats grains and avoids carb's.    Exercise   Do you exercise? What do you do? Yes   How often? Walks daily and goes to the gym twice a week  How long? 30-60      Medication review:  Update medications with meds from other providers as well, removed diclofenac and famotdine since she's no longer using them.  Is there any reason you are unable to  take your medications as prescribed? No  Do you take them every day on time? yes  Do you have any questions about your medications? Patient has no questions about her medications or their side effects.Patient has no questions about her medications or their side effects.    Meds: Detailed medication review with Gilma. Discussed with Gilma if any potential barriers are present that could prevent compliance with medication regimen. At this time, no barriers reported. Gilma reports is stable on all medications and denies experiencing any side effects.    Care Team:  Review specialists and add to care team.    Disease specific:   Do you feel you have a good understanding of your Diabetes and Hypertension.  Do you have chronic pain: Yes, but is going to physical therapy for it.    Provider Goals:  What would you say is your biggest concerns about your health? Not being able to move by myself.  Have any of your providers set forth a goal they wanted you to achieve? Yes   What steps have you taken? Going to physical therapy sessions when recommended   Any barriers? no  Example: eating breakfast daily, walk around the block once a week, cut down on portions for one meal a day.  Etc.    Follow up:  You can call me anytime you have a question or need help with achieving your goals.  I will follow up with you in a few weeks to see how you are doing and if we need to change anything to help you meet your needs.  Remember: what works for one person may not always work for another. We will continue to work on what will help you meet your needs.    Care Plan: Reviewed and updated where needed.  Care manager f/up: in a month  Time Spent This Encounter Total:  29 min medical record review, telephone communication, care plan updates where needed, and education. Provided acknowledgment and validation to patient's concerns.   Monthly Minute Total including today: 29    Physical assessment, complete health history, and need for CCM  established by Tommy Adams MD.

## 2024-06-03 ENCOUNTER — OFFICE VISIT (OUTPATIENT)
Dept: PHYSICAL THERAPY | Facility: HOSPITAL | Age: 84
End: 2024-06-03
Attending: INTERNAL MEDICINE
Payer: MEDICARE

## 2024-06-03 PROCEDURE — 97110 THERAPEUTIC EXERCISES: CPT

## 2024-06-03 NOTE — PROGRESS NOTES
Discharge Summary  Pt has attended 10 visits in Physical Therapy.     Diagnosis:   Chronic left shoulder pain (M25.512,G89.29)  Tendinopathy (M67.90)     R shoulder pain       Referring Provider: Bryan  Date of Evaluation:    3/4/2024    Precautions:  Cancer, HTN Next MD visit:   none scheduled  Date of Surgery: n/a   Insurance Primary/Secondary: [employer] MEDICARE / N/A     # Auth Visits: no auth, 10 per POC             Subjective:When starting therapy felt that the shoulder hurt her all the time, now only happens every once in a while. Has no trouble with reaching behind her back but still has some trouble with reaching in front of her lifting something heavy.      Pain:  0/10 current      Assessment: Patient has attended 10 visits in outpatient physical therapy to address L shoulder pain and B weakness. Since evaluation patient demonstrates notable improvement in shoulder AROM and strength, specifically with shoulder flexion improvement. Pt continues to be most limited with shoulder abduction bilaterally, which is continued to be addressed with HEP. At this time patient will be discharged from formal physical therapy to work independently as she has met her goals and feels significantly better; suggesting continued HEP compliance as patient states she always feels better after.          Objective:      Shoulder AROM: (* denotes performed with pain)  Evaluation  5/13/2024  6/3/2024    Flexion: R 125; L 90* (6/10)  Abduction: R 50; L 130   ER: R C6 (slow, cautious); L T1  IR: R L2 ; L T12*  Flexion: R 140; L 140  Abduction: R 85; L 130   ER: R C7; L T1  IR: R T10 ; L T8 Flexion: R 150; L 150  Abduction: R 80; L 150   ER: R C7; L T1  IR: R T10 ; L T8         Strength/MMT: (* denotes performed with pain)  Shoulder Elbow   Flexion: R 3+*/5; L 3+*/5  Abduction: R 3-*/5; L 3+*/5  ER: R 4-/5; L 4/5  IR: R 4/5; L 4/5 Flexion: R 4/5; L 4/5   6/3/2024    Flexion: R 4-/5; L 4/5  Abduction: R 3+/5; L 4-*/5  ER: R  4/5; L 4+/5  IR: R 4/5; L 5/5   6/3/2024     Flexion: R 5/5; L 5/5            Goals:  (to be met in 10 visits)   Pt will report improved ability to sleep without waking due to shoulder pain Met on occasion  Pt will improve bilateral shoulder flexion AROM to >125 degrees to be able to reach into overhead cabinets without pain or restriction Met   Pt will improve shoulder abduction AROM to >120 degrees to improve ability to don deodorant, don/doff shirts, and wash hair Met   Pt will increase shoulder AROM ER to T1 to be able to reach and fasten seatbelt Met   Pt will increase shoulder AROM IR to L1 to be able to reach in back pocket, tuck in shirt, and turn steering wheel without pain Met  Pt will improve shoulder strength throughout to 4/5 to improve function with cooking and carrying a pot of water.  Met, minus shoulder abduction  Pt will be independent and compliant with comprehensive HEP to maintain progress achieved in PT met       QuickDASH Outcome Score  Score: 34.09 % (3/4/2024  4:50 PM)     Post QuickDASH Outcome Score  Post Score: 36.36 % (6/3/2024  2:40 PM)    -2.27 % improvement    Plan:  D/C with HEP compliance    Patient/Family/Caregiver was advised of these findings, precautions, and treatment options and has agreed to actively participate in planning and for this course of care.    Thank you for your referral. If you have any questions, please contact me at Dept: [phone].    Sincerely,  Electronically signed by therapist: Lauryn Webb PT     Physician's certification required:  No  Please co-sign or sign and return this letter via fax as soon as possible to [phone].   I certify the need for these services furnished under this plan of treatment and while under my care.    X___________________________________________________ Date____________________    Certification From: 6/3/2024  To:9/1/2024     Date: 3/7/2024  TX#: 2/10 Date:   3/19/2024               TX#: 3/10 Date:  3/25/24                TX#: 4/10 Date:  4/1/2024               TX#: 5/10 Date: 4/9/2024  Tx#: 6/10  Date: 4/16/2024  Tx#: 7/10  Date: 4/26/2024   Tx: 8/10 Date: 5/13/2024   Tx; 9/10 Date: 6/3/2024   Tx: 10/10   MT: 15'   R shoulder harmonics, 1'   Gr II inferior GH glide, R/L (pain relieving)   STM L biceps, posterior GH complex MT: 12'  R shoulder harmonics, 1'   Gr II inferior GH glide, R/L (pain relieving)   STM L biceps, posterior GH complex Manual: 10'  Gr IIl inferior GH glide, R/L   STM L anterior biceps  MT: 15'  Gr Ill-lV posterior GH glide, R/L   Gr IIl inferior GH glide, R/L   STM L anterior biceps  R/L shoulder harmonics, 1' ea  MT: 10 min  Gr Ill-lV posterior GH glide, R/L   STM R rhomboids        MT: 15 min  Gr Ill posterior GH glide, L   Gr lll inferior GH glide, L  PROM all directions     MT: 8 min  Gr Ill posterior GH glide, L   Gr lll inferior GH glide, L  PROM all directions MT: 8 min  Gr Ill posterior GH glide, L   Gr lll inferior GH glide, L  PROM all directions    TE: 15'  Pulleys, flexion 2'   Pulleys, scaption, 1'   Supine cane flexion (pain free, full ROM  today) x20  S/L ER (R) x20 1#   Standing Rows, B RTB x15   HEP update, review TE: 18'   Pulleys, flexion 2'   Pulleys, scaption, 1'   Supine Chest Press 1# 2x10  Supine horizontal abduction, YTB 2x10   Standing Rows, GTB 2x10  Standing Extension, RTB 2x10  TE: 20'  Pulleys, flexion 2'   Pulleys, scaption, 2'   Finger ladder flexion x7  Supine cane flexion AAROM 1# x20  Supine chest press 1# 2x10  Supine horizontal abduction YTB x15  Seated shoulder abduction towel slides x20 ea  Standing flexion towel slides at wall x15 (R)  Doorway pec stretch 2x30\"           TE: 20'  Pulleys, flexion 2'   Pulleys, scaption, 1' (pain L shoulder)  Finger ladder flexion x7 on L  Supine cane flex AAROM 2# 2x10     Supine chest press 2# 2x10     Standing L horizontal abduction YTB x5 (unable on R); without resistance on L x5 (unable on R)     Supine horizontal abduction YTB x5  (difficult on R); no resistance x10 bilat     Scapular strength assessment     HEP Update     TE: 20 min  Supine shoulder flexion YTB 2x10 L; x10 on R; x10 no resistance on R     Seated cane abduction AAROM x15 ea     ER/IR walkouts YTB x10 ea      Standing rows RTB x5; GTB 2x10     Seated bicep curl 2# x15 ea           TE: 20min  Pulleys, flexion 2'      Finger ladder abduction on R x7     ER/IR walkouts YTB x10 ea      Supine horizontal abduction x10; x10 with YTB     Supine bicep curls with YTB x15 ea                TE: 32'    Pulleys, flexion ,abduction  2'     Supine cane flexion 3# x20     Supine chest press, 3# x20     Supine horizontal abduction x20 YTB     Standing abduction slides, bolster on table x20    IR walkouts YTB x10 ea     Standing rows, GTB B x20    Standing sh extension RTB B x20   TE: 32'    Pulleys, flexion ,abduction  2'     Reassessment    Supine cane flexion 3# x20     Supine chest press, 3# x20    Standing IR, YTB R/L x20ea     Standing ER YTB R/L x20ea     Standing Rows, YTB 2x10     HEP update,review   TE: 30'     Reassessment     S/L shoulder abduction, 1# R/L x20ea    Lower trapezius setting, x5    Pt edu- HEP update, POC, D/C planning   NR: 15'   Supine L bicep/tricep isometric to PT x10, 5s hold   Deltoid Isometric- flexion, abduction, extension 2x5 ea R/L 5s hold  NR: 15'  Supine L bicep/tricep isometric to PT x10, 5s hold   Supine IR/ER isometric to PT x10 R/L   Supine rhythmic stabilization, w/ PT 4x10s R/L   Isometric ER walkout, RTB x8 ea (2 step) NR: 15'  Supine bicep curls with YTB 2x10 ea  Standing flex/ext isometrics to towel 5\" hold x10 ea  SB pushes with perturbations 3x20\" ea     NMR: 10'  Side-lying L shoulder abduction x15  Standing R/L shoulder abduction slides 2x5   Standing Y wall slides x15     NMR:15 min  Standing Y wall slides  x15  Standing Y wall slides with lift off x10  Standing R/L shoulder abduction slides at wall  Small ball circles cw/ccw x30\" ea  Myotomal  assessment     NMR: 10 min  Standing Y slides with lift off at wall x10  Seated cane R abduction AAROM x15  Seated table shoulder abduction slides on R x15                         HEP:   Access Code: [email or web address]  URL: [email or web address]  Date: 05/13/2024  Prepared by: Lauryn Webb    Exercises  - Supine Shoulder Flexion Extension AAROM with Dowel  - 1 x daily - 7 x weekly - 2 sets - 10 reps  - Shoulder Internal Rotation with Resistance  - 1 x daily - 7 x weekly - 2 sets - 10 reps  - Shoulder External Rotation with Anchored Resistance  - 1 x daily - 7 x weekly - 2 sets - 10 reps  - Low Trap Setting at Wall  - 1 x daily - 7 x weekly - 3 sets - 5 reps  - Standing Shoulder Row with Anchored Resistance  - 1 x daily - 7 x weekly - 2 sets - 10 reps    6/3/2024: S/L shoulder abduction, 1#       Charges: 2 TE            Total Timed Treatment: 30' min                          Total Treatment Time: 30' mi

## 2024-06-10 ENCOUNTER — APPOINTMENT (OUTPATIENT)
Dept: PHYSICAL THERAPY | Facility: HOSPITAL | Age: 84
End: 2024-06-10
Attending: INTERNAL MEDICINE
Payer: MEDICARE

## 2024-07-03 ENCOUNTER — PATIENT OUTREACH (OUTPATIENT)
Dept: CASE MANAGEMENT | Age: 84
End: 2024-07-03

## 2024-07-03 DIAGNOSIS — Z85.828 HISTORY OF BASAL CELL CARCINOMA: ICD-10-CM

## 2024-07-03 DIAGNOSIS — E11.3293 CONTROLLED TYPE 2 DIABETES MELLITUS WITH BOTH EYES AFFECTED BY MILD NONPROLIFERATIVE RETINOPATHY WITHOUT MACULAR EDEMA, WITHOUT LONG-TERM CURRENT USE OF INSULIN (HCC): ICD-10-CM

## 2024-07-03 DIAGNOSIS — H35.372 EPIRETINAL MEMBRANE (ERM) OF LEFT EYE: ICD-10-CM

## 2024-07-03 DIAGNOSIS — M85.89 OSTEOPENIA OF MULTIPLE SITES: ICD-10-CM

## 2024-07-03 DIAGNOSIS — Z12.11 ENCOUNTER FOR FIT (FECAL IMMUNOCHEMICAL TEST) SCREENING: Primary | ICD-10-CM

## 2024-07-03 DIAGNOSIS — I10 ESSENTIAL HYPERTENSION: ICD-10-CM

## 2024-07-03 NOTE — PROGRESS NOTES
7/3/2024  Spoke to Gilma for [facility].      Updates to patient care team/comments:  UTD/Reviewed with patient.  Patient reported changes in medications: None   Med Adherence  Comment:  taking as directed     Health Maintenance: UTD/Reviewed with patient  Health Maintenance   Topic Date Due    Zoster Vaccines (1 of 2) Never done patient declined    COVID-19 Vaccine (5 - 2023-24 season) 09/01/2023    Colorectal Cancer Screening  09/08/2023 Fit test card to be mailed out    Diabetes Care Foot Exam  02/27/2024    Diabetes Care A1C  08/19/2024    Diabetes Care: Microalb/Creat Ratio  08/28/2024    Influenza Vaccine (1) 10/01/2024    Diabetes Care: GFR  02/19/2025    Diabetes Care Dilated Eye Exam  05/30/2025    DEXA Scan  Completed    MA Annual Health Assessment  Completed    Annual Depression Screening  Completed    Fall Risk Screening (Annual)  Completed    Pneumococcal Vaccine: 65+ Years  Completed       Patient updates/concerns:     The patient reports to continue with lower leg pains and cramping on a daily basis, she feels she has more symptoms when she is relaxed and sitting down and she has to continually be moving them to prevent the symptoms.  Patient is eating bananas as instructed by her PCP to help increase potassium, she is also using OTC B12 complex but feels is not helping her at all or very minimal.    The patient had eye surgery with Dr Crawley in June of 2023 but feels she has more symptoms but was told by the doctor she needed to just use OTC lubricant eye drops and that more likely it was dryness, she is doing what was instructed but issue has not resolved.    Goals/Action Plan:    Active goal from previous outreach: Patient wants to be able to have better vision    Patient reported progress towards goals: ongoing               - What: better vision           - Where/When/How: Protect her eyes from direct sun and heat exposure.  Patient Reported Barriers and Concerns: None                   - Plan for  overcoming barriers: N/A    Care Managers Interventions:     FIT test card requested to PCP's office to be mailed to patient.    The patient loves to take walks on  a daily basis, CCM highly recommended her to use sunscreen when expose to the sun, and more because she has history of skin cancer, she agreed to do as instructed.    The patient was encouraged to use sunglasses to ease her symptoms with eye dryness.     CCM encouraged patient to take time to enjoy the things she likes to do.  CCM motivated the patient to stay active and be as productive as possible to stay healthy and prevent further complications.    Reconciled the patient's chart using care everywhere.    Updated health maintenance:   Recommended patient to get shingrix and covid vaccine from local pharmacy but she refused Shingrix and will think about getting Covid.    Immunization Query completed: No updates available.    Encouraged patient to call as needed.     Appointments reviewed with patient as below      Future Appointments:   Future Appointments   Date Time Provider Department Center   8/5/2024  3:45 PM Ashley Caldwell MD [facility]   8/28/2024 10:00 AM Tommy Adams MD [id number]         Next Care Manager Follow Up Date: in a month    Reason For Follow Up: review progress and or barriers towards patient's goals.     Time Spent This Encounter Total:  24min medical record review, telephone communication, care plan updates where needed, education, goals, and action plan recreation/update. Provided acknowledgment and validation to patient's concerns.   Monthly Minute Total including today: 24  Physical assessment, complete health history, and need for CCM established by Tommy Adams MD.

## 2024-07-15 ENCOUNTER — TELEPHONE (OUTPATIENT)
Dept: INTERNAL MEDICINE CLINIC | Facility: CLINIC | Age: 84
End: 2024-07-15

## 2024-07-15 NOTE — TELEPHONE ENCOUNTER
Please mail FIT test card to patient's mail address, order is placed already.    Thank you,    Erin

## 2024-07-29 ENCOUNTER — NURSE ONLY (OUTPATIENT)
Dept: INTERNAL MEDICINE CLINIC | Facility: CLINIC | Age: 84
End: 2024-07-29
Payer: COMMERCIAL

## 2024-07-29 DIAGNOSIS — Z12.11 ENCOUNTER FOR FIT (FECAL IMMUNOCHEMICAL TEST) SCREENING: ICD-10-CM

## 2024-07-29 LAB — HEMOCCULT STL QL: NEGATIVE

## 2024-07-29 PROCEDURE — 82274 ASSAY TEST FOR BLOOD FECAL: CPT | Performed by: INTERNAL MEDICINE

## 2024-08-05 ENCOUNTER — OFFICE VISIT (OUTPATIENT)
Dept: DERMATOLOGY CLINIC | Facility: CLINIC | Age: 84
End: 2024-08-05

## 2024-08-05 DIAGNOSIS — D22.9 MULTIPLE NEVI: ICD-10-CM

## 2024-08-05 DIAGNOSIS — Z08 ENCOUNTER FOR FOLLOW-UP SURVEILLANCE OF SKIN CANCER: ICD-10-CM

## 2024-08-05 DIAGNOSIS — D23.9 BENIGN NEOPLASM OF SKIN, UNSPECIFIED LOCATION: ICD-10-CM

## 2024-08-05 DIAGNOSIS — L81.4 LENTIGO: ICD-10-CM

## 2024-08-05 DIAGNOSIS — L82.1 SK (SEBORRHEIC KERATOSIS): ICD-10-CM

## 2024-08-05 DIAGNOSIS — Z85.828 ENCOUNTER FOR FOLLOW-UP SURVEILLANCE OF SKIN CANCER: ICD-10-CM

## 2024-08-05 DIAGNOSIS — D48.5 NEOPLASM OF UNCERTAIN BEHAVIOR OF SKIN: Primary | ICD-10-CM

## 2024-08-05 PROCEDURE — 11102 TANGNTL BX SKIN SINGLE LES: CPT | Performed by: DERMATOLOGY

## 2024-08-05 PROCEDURE — 88305 TISSUE EXAM BY PATHOLOGIST: CPT | Performed by: DERMATOLOGY

## 2024-08-05 PROCEDURE — 1160F RVW MEDS BY RX/DR IN RCRD: CPT | Performed by: DERMATOLOGY

## 2024-08-05 PROCEDURE — 1126F AMNT PAIN NOTED NONE PRSNT: CPT | Performed by: DERMATOLOGY

## 2024-08-05 PROCEDURE — 1159F MED LIST DOCD IN RCRD: CPT | Performed by: DERMATOLOGY

## 2024-08-05 PROCEDURE — 99203 OFFICE O/P NEW LOW 30 MIN: CPT | Performed by: DERMATOLOGY

## 2024-08-05 RX ORDER — KETOROLAC TROMETHAMINE 5 MG/ML
SOLUTION OPHTHALMIC
COMMUNITY
Start: 2024-05-31

## 2024-08-08 NOTE — PROGRESS NOTES
The pathology report from last visit showed    right medial upper back/posterior neck, shave biopsy:  -Seborrheic keratosis, irritated and hyperpigmented.  -Underlying scar  Please log in test results, send biopsy results letter.  Pt to rtc 1 year or prn.

## 2024-08-13 ENCOUNTER — TELEPHONE (OUTPATIENT)
Dept: INTERNAL MEDICINE CLINIC | Facility: CLINIC | Age: 84
End: 2024-08-13

## 2024-08-13 NOTE — TELEPHONE ENCOUNTER
Results discussed with patient above, patient verbalized understanding and has no questions at this time.

## 2024-08-13 NOTE — TELEPHONE ENCOUNTER
----- Message from Tommy Adams sent at 8/12/2024 12:12 PM CDT -----  Please inform fecal occult test is negative. Yearly screening is recommended.

## 2024-08-18 NOTE — PROGRESS NOTES
Gilma Ghotra is a 84 year old female.  HPI:     CC:    Chief Complaint   Patient presents with    Full Skin Exam     LOV 02/20. Pt present for full body exam. Pt has hx of BCC (right lower leg; MOHS was preformed). Pt denies any concerns.            HISTORY:    Past Medical History:    Basal cell carcinoma (BCC) of right lower extremity    BCC (basal cell carcinoma of skin)    right tibia    Breast cancer (HCC)    s/p R breast mastectomy @ [facility] and on pharmacutical treatment x5 years subsequently (Dr. Harris)    Diabetes (HCC)    diet controlled    Hearing impairment    hard of hearing    High blood pressure    High cholesterol    Hyperlipidemia    Visual impairment    glasses      Past Surgical History:   Procedure Laterality Date    Biopsy of skin lesion      benign results    Cataract      bilateral     Eduardo biopsy stereo nodule 1 site left (cpt=19081) Left 07/05/2023    yasir stereo for calcs  tophat clip    Mastectomy right  2001 2/2 breast CA, with lymph node removal    Skin tissue rearrangement Right 11/22/2019    Wide excision BCC right leg,flap reconstruction      Family History   Problem Relation Age of Onset    Heart Attack Father     Stroke Mother     No Known Problems Daughter     Alcohol and Other Disorders Associated Son     Breast Cancer Self 62      Social History     Socioeconomic History    Marital status:    Tobacco Use    Smoking status: Never    Smokeless tobacco: Never   Vaping Use    Vaping status: Never Used   Substance and Sexual Activity    Alcohol use: Yes     Comment: socially    Drug use: No   Other Topics Concern    Breast feeding No    Reaction to local anesthetic No    Pt has a pacemaker No    Pt has a defibrillator No     Social Determinants of Health     Financial Resource Strain: Low Risk  (5/24/2024)    Financial Resource Strain     Difficulty of Paying Living Expenses: Not hard at all     Med Affordability: No   Food Insecurity: No Food  Insecurity (5/24/2024)    Food Insecurity     Food Insecurity: Never true   Transportation Needs: No Transportation Needs (5/24/2024)    Transportation Needs     Lack of Transportation: No   Physical Activity: Insufficiently Active (5/24/2024)    Exercise Vital Sign     Days of Exercise per Week: 4 days     Minutes of Exercise per Session: 30 min   Stress: No Stress Concern Present (5/24/2024)    Stress     Feeling of Stress : No   Social Connections: Socially Integrated (5/24/2024)    Social Connections     Frequency of Socialization with Friends and Family: 3   Housing Stability: Low Risk  (5/24/2024)    Housing Stability     Housing Instability: No        Current Outpatient Medications   Medication Sig Dispense Refill    ketorolac 0.5 % Ophthalmic Solution       Multiple Vitamin (MULTI-VITAMIN) Oral Tab Take 1 tablet by mouth daily.      ATORVASTATIN 10 MG Oral Tab TOME MEDIA TABLETA POR LA BOCA  CADA NOCHE 50 tablet 2    enalapril 2.5 MG Oral Tab Take 1 tablet (2.5 mg total) by mouth daily. 100 tablet 2    naproxen 500 MG Oral Tab Take 1 tablet (500 mg total) by mouth 2 (two) times daily as needed. 60 tablet 1     Allergies:   No Known Allergies    Past Medical History:    Basal cell carcinoma (BCC) of right lower extremity    BCC (basal cell carcinoma of skin)    right tibia    Breast cancer (HCC)    s/p R breast mastectomy @ [facility] and on pharmacutical treatment x5 years subsequently (Dr. Harris)    Diabetes (HCC)    diet controlled    Hearing impairment    hard of hearing    High blood pressure    High cholesterol    Hyperlipidemia    Visual impairment    glasses     Past Surgical History:   Procedure Laterality Date    Biopsy of skin lesion      benign results    Cataract      bilateral     Eduardo biopsy stereo nodule 1 site left (cpt=19081) Left 07/05/2023    yasir stereo for calcs  tophat clip    Mastectomy right  2001 2/2 breast CA, with lymph node removal    Skin tissue rearrangement  Right 11/22/2019    Wide excision BCC right leg,flap reconstruction     Social History     Socioeconomic History    Marital status:      Spouse name: Not on file    Number of children: Not on file    Years of education: Not on file    Highest education level: Not on file   Occupational History    Not on file   Tobacco Use    Smoking status: Never    Smokeless tobacco: Never   Vaping Use    Vaping status: Never Used   Substance and Sexual Activity    Alcohol use: Yes     Comment: socially    Drug use: No    Sexual activity: Not on file   Other Topics Concern    Caffeine Concern Not Asked    Exercise Not Asked    Seat Belt Not Asked    Special Diet Not Asked    Stress Concern Not Asked    Weight Concern Not Asked    Grew up on a farm Not Asked    History of tanning Not Asked    Outdoor occupation Not Asked    Breast feeding No    Reaction to local anesthetic No    Pt has a pacemaker No    Pt has a defibrillator No   Social History Narrative    Not on file     Social Determinants of Health     Financial Resource Strain: Low Risk  (5/24/2024)    Financial Resource Strain     Difficulty of Paying Living Expenses: Not hard at all     Med Affordability: No   Food Insecurity: No Food Insecurity (5/24/2024)    Food Insecurity     Food Insecurity: Never true   Transportation Needs: No Transportation Needs (5/24/2024)    Transportation Needs     Lack of Transportation: No     Car Seat: Not on file   Physical Activity: Insufficiently Active (5/24/2024)    Exercise Vital Sign     Days of Exercise per Week: 4 days     Minutes of Exercise per Session: 30 min   Stress: No Stress Concern Present (5/24/2024)    Stress     Feeling of Stress : No   Social Connections: Socially Integrated (5/24/2024)    Social Connections     Frequency of Socialization with Friends and Family: 3   Housing Stability: Low Risk  (5/24/2024)    Housing Stability     Housing Instability: No     Housing Instability Emergency: Not on file     Crib or  Loli: Not on file     Family History   Problem Relation Age of Onset    Heart Attack Father     Stroke Mother     No Known Problems Daughter     Alcohol and Other Disorders Associated Son     Breast Cancer Self 62       There were no vitals filed for this visit.    HPI:  Chief Complaint   Patient presents with    Full Skin Exam     LOV 02/20. Pt present for full body exam. Pt has hx of BCC (right lower leg; MOHS was preformed). Pt denies any concerns.        Patient with personal history of BCC right lower leg post Mohs, for skin exam    Patient presents with concerns above.    Patient has been in their usual state of health.     Past notes/ records and appropriate/relevant lab results including pathology and past body maps reviewed. Including outside notes/ PCP notes as appropriate. Updated and new information noted in current visit.     ROS:  Denies other relevant systemic complaints.      History, medications, allergies reviewed as noted.    Allergies:  Patient has no known allergies.     Physical Examination:     Well-developed well-nourished patient alert oriented in no acute distress.  Exam performed, including scalp, head, neck, face,nails, hair, external eyes, including conjunctival mucosa, eyelids, lips external ears , arms, digits,palms.     Multiple light to medium brown, well marginated, uniformly pigmented, macules and papules 6 mm and less scattered on exam. pigmented lesions examined with dermoscopy benign-appearing patterns.     Waxy tannish keratotic papules scattered, cherry-red vascular papules scattered.    See map today's date for lesions noted .  See assessment and plan below for specific lesions.    Otherwise remarkable for lesions as noted on map.    See A/P  below for additional information:    Assessment / plan:    Orders Placed This Encounter   Procedures    Specimen to Pathology, Tissue [[facility] Pt to [facility] lab]       Meds & Refills for this Visit:  Requested Prescriptions      No  prescriptions requested or ordered in this encounter         Encounter Diagnoses   Name Primary?    Neoplasm of uncertain behavior of skin Yes    Encounter for follow-up surveillance of skin cancer     Lentigo     SK (seborrheic keratosis)     Benign neoplasm of skin, unspecified location     Multiple nevi             Patient with history of NMSC dark black papule with lighter center, 1.1cm irregular papule r/o atypical pigmented lesion right medial upper back /posterior neck   Shave/ tangential biopsy performed, operative note and consent in chart further plans pending pathology      SK darker varied at right posterior shoulder      Papule 4 mm right medial canthus patient aware eye doctor monitoring continue careful follow-up appears to be intradermal nevus    Multiple lentigines on the face reassurance    SKs over the back reassurance including at right posterior shoulder    Patient with history of skin cancer.  No evidence of recurrence.    No new skin cancer.  History BCC post excision right lower leg no evidence of recurrence    Benign-appearing nevi, no atypical features on dermoscopy reassurance given monitor.     Waxy tan keratotic papules lesions in areas of concern as noted reassurance given.  Benign nature discussed.  Possibility of cryo, alphahydroxy acids over-the-counter retinol's discussed.     No other susupicious lesions on todays  exam.    Please refer to map for specific lesions.  See additional diagnoses.  Pros cons of various therapies, risks benefits discussed.Pathophysiology discussed with patient.  Therapeutic options reviewed.  See  Medications in grid.  Instructions reviewed at length.    Benign nevi, seborrheic  keratoses, cherry angiomas:  Reassurance regarding other benign skin lesions.Signs and symptoms of skin cancer, ABCDE's of melanoma discussed with patient. Sunscreen (broad-spectrum, ideally mineral-based-UVA/UVB -SPF 30 or higher) use encouraged, sun protection/sun protective  clothing, self exams reviewed Followup as noted RTC ---routine checkup    6 mos -one year or p.r.n.    Encounter Times   Including precharting, reviewing chart, prior notes obtaining history: 10 minutes, medical exam :10 minutes, notes on body map, plan, counseling 10minutes My total time spent caring for the patient on the day of the encounter: 30 minutes     The patient indicates understanding of these issues and agrees to the plan.  The patient is asked to return as noted in follow-up/ above.    This note was generated using Dragon voice recognition software.  Please contact me regarding any confusion resulting from errors in recognition..  Note to patient and family: The 21st Century Cures Act makes medical notes like these available to patients. However, be advised this is a medical document. It is intended as pead-ym-ezsk communication and monitoring of a patient's care needs. It is written in medical language and may contain abbreviations or verbiage that are unfamiliar. It may appear blunt or direct. Medical documents are intended to carry relevant information, facts as evident and the clinical opinion of the practitioner.   impaired

## 2024-08-22 ENCOUNTER — TELEPHONE (OUTPATIENT)
Dept: INTERNAL MEDICINE CLINIC | Facility: CLINIC | Age: 84
End: 2024-08-22

## 2024-08-22 DIAGNOSIS — E11.3293 CONTROLLED TYPE 2 DIABETES MELLITUS WITH BOTH EYES AFFECTED BY MILD NONPROLIFERATIVE RETINOPATHY WITHOUT MACULAR EDEMA, WITHOUT LONG-TERM CURRENT USE OF INSULIN (HCC): Primary | ICD-10-CM

## 2024-08-24 ENCOUNTER — LAB ENCOUNTER (OUTPATIENT)
Dept: LAB | Facility: HOSPITAL | Age: 84
End: 2024-08-24
Attending: INTERNAL MEDICINE
Payer: MEDICARE

## 2024-08-24 LAB
ALBUMIN SERPL-MCNC: 4.7 G/DL (ref 3.2–4.8)
ALBUMIN/GLOB SERPL: 1.6 {RATIO} (ref 1–2)
ALP LIVER SERPL-CCNC: 111 U/L
ALT SERPL-CCNC: 16 U/L
ANION GAP SERPL CALC-SCNC: 6 MMOL/L (ref 0–18)
AST SERPL-CCNC: 23 U/L (ref ?–34)
BASOPHILS # BLD AUTO: 0.03 X10(3) UL (ref 0–0.2)
BASOPHILS NFR BLD AUTO: 0.4 %
BILIRUB SERPL-MCNC: 0.9 MG/DL (ref 0.2–1.1)
BUN BLD-MCNC: 8 MG/DL (ref 9–23)
BUN/CREAT SERPL: 12.9 (ref 10–20)
CALCIUM BLD-MCNC: 10.1 MG/DL (ref 8.7–10.4)
CHLORIDE SERPL-SCNC: 100 MMOL/L (ref 98–112)
CO2 SERPL-SCNC: 27 MMOL/L (ref 21–32)
CREAT BLD-MCNC: 0.62 MG/DL
CREAT UR-SCNC: 35.9 MG/DL
DEPRECATED RDW RBC AUTO: 40.3 FL (ref 35.1–46.3)
EGFRCR SERPLBLD CKD-EPI 2021: 88 ML/MIN/1.73M2 (ref 60–?)
EOSINOPHIL # BLD AUTO: 0.13 X10(3) UL (ref 0–0.7)
EOSINOPHIL NFR BLD AUTO: 1.8 %
ERYTHROCYTE [DISTWIDTH] IN BLOOD BY AUTOMATED COUNT: 12.5 % (ref 11–15)
EST. AVERAGE GLUCOSE BLD GHB EST-MCNC: 137 MG/DL (ref 68–126)
FASTING STATUS PATIENT QL REPORTED: YES
GLOBULIN PLAS-MCNC: 2.9 G/DL (ref 2–3.5)
GLUCOSE BLD-MCNC: 148 MG/DL (ref 70–99)
HBA1C MFR BLD: 6.4 % (ref ?–5.7)
HCT VFR BLD AUTO: 40.6 %
HGB BLD-MCNC: 13.7 G/DL
IMM GRANULOCYTES # BLD AUTO: 0.01 X10(3) UL (ref 0–1)
IMM GRANULOCYTES NFR BLD: 0.1 %
LYMPHOCYTES # BLD AUTO: 2.63 X10(3) UL (ref 1–4)
LYMPHOCYTES NFR BLD AUTO: 37.3 %
MCH RBC QN AUTO: 29.8 PG (ref 26–34)
MCHC RBC AUTO-ENTMCNC: 33.7 G/DL (ref 31–37)
MCV RBC AUTO: 88.3 FL
MICROALBUMIN UR-MCNC: 0.5 MG/DL
MICROALBUMIN/CREAT 24H UR-RTO: 13.9 UG/MG (ref ?–30)
MONOCYTES # BLD AUTO: 0.58 X10(3) UL (ref 0.1–1)
MONOCYTES NFR BLD AUTO: 8.2 %
NEUTROPHILS # BLD AUTO: 3.68 X10 (3) UL (ref 1.5–7.7)
NEUTROPHILS # BLD AUTO: 3.68 X10(3) UL (ref 1.5–7.7)
NEUTROPHILS NFR BLD AUTO: 52.2 %
OSMOLALITY SERPL CALC.SUM OF ELEC: 277 MOSM/KG (ref 275–295)
PLATELET # BLD AUTO: 231 10(3)UL (ref 150–450)
POTASSIUM SERPL-SCNC: 4.5 MMOL/L (ref 3.5–5.1)
PROT SERPL-MCNC: 7.6 G/DL (ref 5.7–8.2)
RBC # BLD AUTO: 4.6 X10(6)UL
SODIUM SERPL-SCNC: 133 MMOL/L (ref 136–145)
WBC # BLD AUTO: 7.1 X10(3) UL (ref 4–11)

## 2024-08-24 PROCEDURE — 36415 COLL VENOUS BLD VENIPUNCTURE: CPT | Performed by: INTERNAL MEDICINE

## 2024-08-24 PROCEDURE — 80053 COMPREHEN METABOLIC PANEL: CPT | Performed by: INTERNAL MEDICINE

## 2024-08-24 PROCEDURE — 82043 UR ALBUMIN QUANTITATIVE: CPT | Performed by: INTERNAL MEDICINE

## 2024-08-24 PROCEDURE — 83036 HEMOGLOBIN GLYCOSYLATED A1C: CPT | Performed by: INTERNAL MEDICINE

## 2024-08-24 PROCEDURE — 82570 ASSAY OF URINE CREATININE: CPT | Performed by: INTERNAL MEDICINE

## 2024-08-24 PROCEDURE — 85025 COMPLETE CBC W/AUTO DIFF WBC: CPT | Performed by: INTERNAL MEDICINE

## 2024-08-28 ENCOUNTER — OFFICE VISIT (OUTPATIENT)
Dept: INTERNAL MEDICINE CLINIC | Facility: CLINIC | Age: 84
End: 2024-08-28
Payer: COMMERCIAL

## 2024-08-28 VITALS
WEIGHT: 102 LBS | OXYGEN SATURATION: 98 % | SYSTOLIC BLOOD PRESSURE: 120 MMHG | BODY MASS INDEX: 22.95 KG/M2 | DIASTOLIC BLOOD PRESSURE: 46 MMHG | HEART RATE: 89 BPM | HEIGHT: 56 IN

## 2024-08-28 DIAGNOSIS — E11.3293 CONTROLLED TYPE 2 DIABETES MELLITUS WITH BOTH EYES AFFECTED BY MILD NONPROLIFERATIVE RETINOPATHY WITHOUT MACULAR EDEMA, WITHOUT LONG-TERM CURRENT USE OF INSULIN (HCC): Primary | ICD-10-CM

## 2024-08-28 DIAGNOSIS — R21 RASH: ICD-10-CM

## 2024-08-28 DIAGNOSIS — Z71.85 VACCINE COUNSELING: ICD-10-CM

## 2024-08-28 DIAGNOSIS — I10 ESSENTIAL HYPERTENSION: ICD-10-CM

## 2024-08-28 PROCEDURE — 1159F MED LIST DOCD IN RCRD: CPT | Performed by: INTERNAL MEDICINE

## 2024-08-28 PROCEDURE — 3008F BODY MASS INDEX DOCD: CPT | Performed by: INTERNAL MEDICINE

## 2024-08-28 PROCEDURE — 1160F RVW MEDS BY RX/DR IN RCRD: CPT | Performed by: INTERNAL MEDICINE

## 2024-08-28 PROCEDURE — 3074F SYST BP LT 130 MM HG: CPT | Performed by: INTERNAL MEDICINE

## 2024-08-28 PROCEDURE — 99214 OFFICE O/P EST MOD 30 MIN: CPT | Performed by: INTERNAL MEDICINE

## 2024-08-28 PROCEDURE — G2211 COMPLEX E/M VISIT ADD ON: HCPCS | Performed by: INTERNAL MEDICINE

## 2024-08-28 PROCEDURE — 3078F DIAST BP <80 MM HG: CPT | Performed by: INTERNAL MEDICINE

## 2024-08-28 RX ORDER — CYCLOBENZAPRINE HCL 5 MG
5 TABLET ORAL NIGHTLY PRN
Qty: 30 TABLET | Refills: 1 | Status: SHIPPED | OUTPATIENT
Start: 2024-08-28 | End: 2024-09-17

## 2024-08-28 RX ORDER — TRIAMCINOLONE ACETONIDE 1 MG/G
CREAM TOPICAL 2 TIMES DAILY PRN
Qty: 60 G | Refills: 3 | Status: SHIPPED | OUTPATIENT
Start: 2024-08-28

## 2024-08-28 NOTE — PROGRESS NOTES
[facility] part of [facility]  Return Patient Progress Note      HPI:     Chief Complaint   Patient presents with    Follow - Up     6 months f/u   Last A1c 6.4- 08/2024    Rash      rash on the inner thigh  Mosquito bite       Gilma Ghotra is a 84 year old female presenting for:    Has a significant  has a past medical history of Basal cell carcinoma (BCC) of right lower extremity (11/4/2019), BCC (basal cell carcinoma of skin) (2019), Breast cancer (HCC) (2001), Diabetes (HCC), Hearing impairment, High blood pressure, High cholesterol, Hyperlipidemia, and Visual impairment.      DM II controlled with diet.     HTN on enalapril.      Had lab work done recently.     Reports rash on left inner thigh.      Labs:   CMP:  Lab Results   Component Value Date/Time     (L) 08/24/2024 08:54 AM    K 4.5 08/24/2024 08:54 AM     08/24/2024 08:54 AM    CO2 27.0 08/24/2024 08:54 AM    CREATSERUM 0.62 08/24/2024 08:54 AM    CA 10.1 08/24/2024 08:54 AM     (H) 08/24/2024 08:54 AM    TP 7.6 08/24/2024 08:54 AM    ALB 4.7 08/24/2024 08:54 AM    ALKPHO 111 08/24/2024 08:54 AM    AST 23 08/24/2024 08:54 AM    ALT 16 08/24/2024 08:54 AM    BILT 0.9 08/24/2024 08:54 AM        CBC:  Lab Results   Component Value Date    WBC 7.1 08/24/2024    HGB 13.7 08/24/2024    HCT 40.6 08/24/2024    .0 08/24/2024    NEPERCENT 52.2 08/24/2024    LYPERCENT 37.3 08/24/2024    MOPERCENT 8.2 08/24/2024    EOPERCENT 1.8 08/24/2024    BAPERCENT 0.4 08/24/2024    NE 3.68 08/24/2024    LYMABS 2.63 08/24/2024    MOABSO 0.58 08/24/2024    EOABSO 0.13 08/24/2024    BAABSO 0.03 08/24/2024          Hemoglobin A1C, Microalbumin  Lab Results   Component Value Date/Time    A1C 6.4 (H) 08/24/2024 08:54 AM        Lipid panel  Lab Results   Component Value Date/Time    CHOLEST 142 02/19/2024 08:55 AM    HDL 57 02/19/2024 08:55 AM    TRIG 74 02/19/2024 08:55 AM    LDL 70 02/19/2024 08:55 AM    NONHDLC 85 02/19/2024 08:55 AM         Medications:  Current Outpatient Medications   Medication Sig Dispense Refill    ketorolac 0.5 % Ophthalmic Solution       Multiple Vitamin (MULTI-VITAMIN) Oral Tab Take 1 tablet by mouth daily.      ATORVASTATIN 10 MG Oral Tab TOME MEDIA TABLETA POR LA BOCA  CADA NOCHE 50 tablet 2    enalapril 2.5 MG Oral Tab Take 1 tablet (2.5 mg total) by mouth daily. 100 tablet 2    naproxen 500 MG Oral Tab Take 1 tablet (500 mg total) by mouth 2 (two) times daily as needed. 60 tablet 1      PMH:  Past Medical History:    Basal cell carcinoma (BCC) of right lower extremity    BCC (basal cell carcinoma of skin)    right tibia    Breast cancer (HCC)    s/p R breast mastectomy @ [facility] and on pharmacutical treatment x5 years subsequently (Dr. Harris)    Diabetes ([facility])    diet controlled    Hearing impairment    hard of hearing    High blood pressure    High cholesterol    Hyperlipidemia    Visual impairment    glasses         PSH:  Past Surgical History:   Procedure Laterality Date    Biopsy of skin lesion      benign results    Cataract      bilateral     Eduardo biopsy stereo nodule 1 site left (cpt=19081) Left 07/05/2023    yasir stereo for calcs  tophat clip    Mastectomy right  2001 2/2 breast CA, with lymph node removal    Skin tissue rearrangement Right 11/22/2019    Wide excision BCC right leg,flap reconstruction       Allergies:  No Known Allergies   Social History:  Social History     Socioeconomic History    Marital status:    Tobacco Use    Smoking status: Never    Smokeless tobacco: Never   Vaping Use    Vaping status: Never Used   Substance and Sexual Activity    Alcohol use: Yes     Comment: socially    Drug use: No   Other Topics Concern    Breast feeding No    Reaction to local anesthetic No    Pt has a pacemaker No    Pt has a defibrillator No     Social Determinants of Health     Financial Resource Strain: Low Risk  (5/24/2024)    Financial Resource Strain     Difficulty of Paying  Living Expenses: Not hard at all     Med Affordability: No   Food Insecurity: No Food Insecurity (5/24/2024)    Food Insecurity     Food Insecurity: Never true   Transportation Needs: No Transportation Needs (5/24/2024)    Transportation Needs     Lack of Transportation: No   Physical Activity: Insufficiently Active (5/24/2024)    Exercise Vital Sign     Days of Exercise per Week: 4 days     Minutes of Exercise per Session: 30 min   Stress: No Stress Concern Present (5/24/2024)    Stress     Feeling of Stress : No   Social Connections: Socially Integrated (5/24/2024)    Social Connections     Frequency of Socialization with Friends and Family: 3   Housing Stability: Low Risk  (5/24/2024)    Housing Stability     Housing Instability: No      Family History:  Family History   Problem Relation Age of Onset    Heart Attack Father     Stroke Mother     No Known Problems Daughter     Alcohol and Other Disorders Associated Son     Breast Cancer Self 62              REVIEW OF SYSTEMS:   Review of Systems   Constitutional:  Negative for chills, fatigue, fever and unexpected weight change.   HENT:  Negative for congestion, ear pain, hearing loss, rhinorrhea, sinus pain and sore throat.    Eyes:  Negative for pain, redness and visual disturbance.   Respiratory:  Negative for apnea, cough, chest tightness, shortness of breath and wheezing.    Cardiovascular:  Negative for chest pain, palpitations and leg swelling.   Gastrointestinal:  Negative for abdominal distention, abdominal pain, blood in stool, constipation and nausea.   Endocrine: Negative for cold intolerance, heat intolerance and polyuria.   Genitourinary:  Negative for dysuria, hematuria and urgency.   Musculoskeletal:  Negative for arthralgias, back pain, gait problem, joint swelling, myalgias and neck pain.   Skin:  Positive for rash. Negative for wound.   Allergic/Immunologic: Negative for food allergies and immunocompromised state.   Neurological:  Negative for  dizziness, seizures, facial asymmetry, speech difficulty, weakness, light-headedness, numbness and headaches.   Hematological:  Negative for adenopathy. Does not bruise/bleed easily.   Psychiatric/Behavioral:  Negative for behavioral problems, sleep disturbance and suicidal ideas. The patient is not nervous/anxious.             PHYSICAL EXAM:   /46   Pulse 89   Ht 4' 8\" (1.422 m)   Wt 102 lb (46.3 kg)   SpO2 98%   BMI 22.87 kg/m²  Estimated body mass index is 22.87 kg/m² as calculated from the following:    Height as of this encounter: 4' 8\" (1.422 m).    Weight as of this encounter: 102 lb (46.3 kg).     Wt Readings from Last 3 Encounters:   08/28/24 102 lb (46.3 kg)   02/28/24 103 lb (46.7 kg)   08/28/23 100 lb (45.4 kg)       Physical Exam  Vitals reviewed.   Constitutional:       General: She is not in acute distress.     Appearance: She is well-developed.   HENT:      Head: Normocephalic and atraumatic.   Eyes:      Conjunctiva/sclera: Conjunctivae normal.      Pupils: Pupils are equal, round, and reactive to light.   Neck:      Thyroid: No thyromegaly.   Cardiovascular:      Rate and Rhythm: Normal rate and regular rhythm.      Heart sounds: Normal heart sounds, S1 normal and S2 normal. No murmur heard.     No friction rub. No gallop.   Pulmonary:      Effort: Pulmonary effort is normal. No respiratory distress.      Breath sounds: Normal breath sounds. No wheezing or rales.   Chest:      Chest wall: No tenderness.   Abdominal:      General: Bowel sounds are normal. There is no distension.      Palpations: Abdomen is soft. There is no mass.      Tenderness: There is no abdominal tenderness. There is no guarding or rebound.   Musculoskeletal:         General: No tenderness. Normal range of motion.      Cervical back: Normal range of motion.   Lymphadenopathy:      Cervical: No cervical adenopathy.   Skin:     General: Skin is warm.      Findings: No erythema or rash.   Neurological:      Mental  Status: She is alert and oriented to person, place, and time.      Cranial Nerves: No cranial nerve deficit.      Deep Tendon Reflexes: Reflexes are normal and symmetric.   Psychiatric:         Behavior: Behavior normal.         Thought Content: Thought content normal.         Judgment: Judgment normal.         Bilateral barefoot skin diabetic exam is normal, visualized feet and the appearance is normal.  Bilateral monofilament/sensation of both feet is normal.  Pulsation pedal pulse exam of both lower legs/feet is normal as well.          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  She prefers to continue to control with diet.      (R21) Rash  Plan: Allergic dermatitis.      (I10) Essential hypertension  Plan: On enalapril.      (Z71.85) Vaccine Counseling  Declining shingles vaccine.        Health Maintenance:    Health Maintenance   Topic Date Due    Zoster Vaccines (1 of 2) Never done    COVID-19 Vaccine (5 - 2023-24 season) 09/01/2023    Diabetes Care Foot Exam  02/27/2024    Colorectal Cancer Screening  07/30/2024    Influenza Vaccine (1) 10/01/2024    Diabetes Care A1C  02/24/2025    Diabetes Care Dilated Eye Exam  05/30/2025    Diabetes Care: GFR  08/24/2025    Diabetes Care: Microalb/Creat Ratio  08/24/2025    DEXA Scan  Completed    MA Annual Health Assessment  Completed    Annual Depression Screening  Completed    Fall Risk Screening (Annual)  Completed    Pneumococcal Vaccine: 65+ Years  Completed         Meds & Refills for this Visit:  Requested Prescriptions      No prescriptions requested or ordered in this encounter       No orders of the defined types were placed in this encounter.      Imaging & Consults:  None          No  follow-ups on file.  Important follow up notes/labs for next visit      Patient indicates understanding of the above recommendations and agrees to the above plan.  Reasurrance and education provided. All questions answered.    Notified to call with any questions, complications, allergies, or worsening or changing symptoms as well as any side effects or complications from the treatments .  Red flags/ ER precautions discussed.    If diagnostic labs or imaging ordered advised patient to contact my office for results  24-48 hours after completion    This note was dictated using dragon speech recognition transcription software.  Typographical and transcription errors may be present.  Please call if any questions.             Tommy Adams MD  [facility]

## 2024-08-29 NOTE — PROCEDURES
[facility]  Procedure Note    Chari Farrell Patient Status:  Outpatient    1940 MRN [id number]   Location [address] [place] [address] Attending Brandin Mullen MD   Hosp Day # 0 PCP Lauryn Pineda MD     Procedure: stereotactic guided biopsy of the left breast    Pre-Procedure Diagnosis:  left breast UOQ calcifications    Post-Procedure Diagnosis: left breast UOQ calcifications    Anesthesia:  Local    Findings:  left breast UOQ calcifications    Specimens: [id number] [address]le Ho,   2023 On 8/29/2024  , Sharona SANDERS, scribed the services personally performed by Dr. Catina Orozco MD.    Chief Concern:    Chief Complaint   Patient presents with   • Office Visit   • Follow-up   • Wart     Patient presents today accompanied by mother, Aleksandra     History of Present Illness  Brianda Peoples is a very pleasant 17 year old female who presents to the Dermatology clinic, for evaluation of the above chief concern(s).     Patient is here for a follow up visit. Last office visit was 7/11/24.    Warts -- pt believes they are gone  Location:  hands  Symptoms:  asymptomatic   Duration/Evolution:  have been present for ~1 year. Has noticed a few more appear over the last couple months.   Treatment:  OTC treatments   - 2 in office LN2 treatment   - imiquimod (ALDARA) 5 % cream-- ran out of rx ~1 month ago. Reports she saw improvement initially, but when she d/c'd the cream the warts came back   Additional Information:   - has had plantar warts previously treated by Kulwinder Ibarra PA-C with cantharidin     Pt struggling with spironolactone.  Increased from 25mg to 50mg, this has thrown her periods off.  Feels unbalanced, cramps have gotten worse.  Has not yet started previously prescribed OCP due to concern for weight gain.     Sun Protection Habits: SPF 30+   History of Severe/Blistering Sun Burns: none   Tanning Bed Use:  No    Past Medical History  Reviewed in EPIC, with pertinent problems noted in HPI and below.     Melanoma: No   Non-melanoma skin cancer: Negative    ALLERGIES:  No Known Allergies    Social History  Reviewed.     Family History  Family history of skin cancer--No family history of skin cancer     Review of Systems  CONSTITUTIONAL: No recent fevers or recent illness.   HEME/LYMPH: No easy bleeding, swollen glands.   SKIN: No other skin changes, itching or bleeding skin lesions, except as noted above.   PREGNANCY STATUS: Not Pregnant    Physical Exam, Assessment and Plan  CONSTITUTIONAL: The patient is  well-groomed and well-developed.   NEURO: Patient is alert and oriented x3; Normal mood and affect.   EYES: Inspection of conjunctivae and lids unremarkable except as noted in skin exam.   SKIN: Examination of the skin included face and hands.   Entire exam performed in the presence of a medical assistant chaperone.   Skin exam unremarkable except as noted below.     Acne Vulgaris  Description: Few erythematous papules on forehead and cheeks and open and closed comedones on forehead, cheeks and chin  Plan:              - Continue tretinoin 0.025 % cream; Apply thin layer (pea sized) to entire face nightly. Can use every other night for 2 weeks then increase to nightly as tolerated.             - Continue spironolactone 50 MG daily.  Side effects of spironolactone including but not limited to low blood pressure, hyperkalemia, breast tenderness, teratogenicity, and irregular menses discussed.             - Discussed starting previously prescribed OCP   - If she decides against starting OCP, discussed decreasing dose of spironolactone and adding in another topical medication     Verruca Vulgaris (other viral warts)  Description: Verrucous papule(s) located on Right 5th finger lateral nail fold x1  Plan:    - Discussed viral etiology of warts including contagious nature, potential for spontaneous resolution, and the difficulty in eradicating these lesions.  Reassured patient warts are benign, treatment not required, but can be done.  The need for multiple treatments to achieve resolution regardless of the treatment modality was discussed.  Patient elects to proceed with cryotherapy for treatment. Discussed expected skin response, healing time, pain/discomfort, crusting, possible blister formation and risk of hypopigmentation and scar.  Patient indicated understanding and elected to proceed.    - Lesion(s) treated with cryotherapy, two 30-60 second freeze/thaw cycles to 1 total lesion(s).  Patient tolerated well.  Wound  care instructions given and discussed.  Today is treatment # 3     Return in about 1 year (around 8/29/2025) for acne f/u.    I, Dr. Catina Orozco, attest that I saw and examined the patient and agree with the above documentation as scribed.  The documentation recorded by the scribe accurately and completely reflects the service(s) I personally performed and the decisions made by me.

## 2024-09-04 RX ORDER — FEXOFENADINE HCL 180 MG/1
180 TABLET ORAL DAILY
Qty: 90 TABLET | Refills: 1 | Status: SHIPPED | OUTPATIENT
Start: 2024-09-04

## 2024-09-10 ENCOUNTER — PATIENT OUTREACH (OUTPATIENT)
Dept: CASE MANAGEMENT | Age: 84
End: 2024-09-10

## 2024-09-10 DIAGNOSIS — E78.00 HYPERCHOLESTEREMIA: ICD-10-CM

## 2024-09-10 DIAGNOSIS — Z85.3 PERSONAL HISTORY OF BREAST CANCER: ICD-10-CM

## 2024-09-10 DIAGNOSIS — I10 ESSENTIAL HYPERTENSION: ICD-10-CM

## 2024-09-10 DIAGNOSIS — Z85.828 HISTORY OF BASAL CELL CARCINOMA: ICD-10-CM

## 2024-09-10 DIAGNOSIS — E11.3293 CONTROLLED TYPE 2 DIABETES MELLITUS WITH BOTH EYES AFFECTED BY MILD NONPROLIFERATIVE RETINOPATHY WITHOUT MACULAR EDEMA, WITHOUT LONG-TERM CURRENT USE OF INSULIN (HCC): Primary | ICD-10-CM

## 2024-09-10 NOTE — PROGRESS NOTES
9/10/2024  Spoke to Gilma for [facility].      Updates to patient care team/comments:  Specialist removed, Dr Cox and added Dr Caldwell, dermatologists.  Patient reported changes in medications: the patient was prescribed cyclobenzaprine for muscle spasm, she also got a Rx for Allegra but hasn't received it yet from the mail order pharmacy.  Med Adherence  Comment: taking as prescribed     Health Maintenance: UTD/Reviewed with patient  Health Maintenance   Topic Date Due    Colorectal Cancer Screening  07/30/2024 had negative FIT test 7/29/24    COVID-19 Vaccine (6 - 2023-24 season) 09/01/2024 reminded to get from local pharmacy    Zoster Vaccines (1 of 2) 10/10/2024 patient refused    Influenza Vaccine (1) 10/01/2024    Diabetes Care A1C  02/24/2025    Diabetes Care Dilated Eye Exam  05/30/2025    Diabetes Care: GFR  08/24/2025    Diabetes Care: Microalb/Creat Ratio  08/24/2025    Diabetes Care Foot Exam  08/28/2025    DEXA Scan  Completed    MA Annual Health Assessment  Completed    Annual Depression Screening  Completed    Fall Risk Screening (Annual)  Completed    Pneumococcal Vaccine: 65+ Years  Completed       Patient updates/concerns:   The patient reported to have symptoms of a cold or possible flu, she started 09/6, she is currently using Nyquil at night to help her sleep better and she is feeling like her symptoms are improving. The patient didn't get tested for covid or influenza but her  was the first one to get sick.    The patient was seen by PCP for a six month follow up on 08/28/2024, during her visit labs were reviewed.    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  She prefers to continue to control with diet.    The patient was prescribed an allergy pill for a rash she was experiencing, MD sent in a Rx to mail order  pharmacy for Allegra but patient hasn't received it yet and asked CCM to contact pharmacy for an update.    The patient was seen by Dr Caldwell and a skin lesion that was removed from her back was sent out to pathology.    The report of the Biopsy done at last visit shows a Seborrheic Keratosis.  This is a benign (not cancerous) growth, and requires no further treatment.  Please follow up with our office 1 year or as needed.  If you have any questions please contact our office.        Yours Truly,     Ashley Caldwell MD    The patient stated she got the results from PCP during her last visit, even though a letter was sent to patient from dermatologist office with results she never saw it because her My Chart account is not active.    Goals/Action Plan:    Active goal from previous outreach: stay healthy, continue to control diabetes with diet only    Patient reported progress towards goals: ongoing and working towards goal               - What: continue to stay healthy.           - Where/When/How: the patient will continue with strict meal choices to prevent uncontrolled sugars.  Patient Reported Barriers and Concerns: None                   - Plan for overcoming barriers: N/A    Care Managers Interventions:   CCM called [facility] mail order pharmacy to find out the status of Gilma's allegra prescription, per pharmacy tech Dasia, they see that the Rx was received but is not covered since is an OTC medication, she also stated that a letter was mailed out to patient explaining her the reason as of why the Rx was not filled.  3 mins with pharmacy.    The patient was notified of the above and agreed to buy some Allegra or the generic OTC.    CCM encouraged patient to take the time to enjoy the things she likes to do.    CCM motivates patient to stay active and be as productive as possible to stay healthy and prevent further health complications.    Reconciled the patient's chart using care everywhere.    Updated health  maintenance: Recommended patient about getting shingrix and covid vaccine from local pharmacy but denies to get them.    Immunization Query completed: No updates available.    Encouraged patient to call as needed.     Future Appointments:   Future Appointments   Date Time Provider Department Center   3/6/2025 10:20 AM Tommy Adams MD [facility]         Next Care Manager Follow Up Date: in a month    Reason For Follow Up: review progress and or barriers towards patient's goals.     Time Spent This Encounter Total: 22 min medical record review, telephone communication, care plan updates where needed, education, goals, and action plan recreation/update. Provided acknowledgment and validation to patient's concerns.   Monthly Minute Total including today: 22  Physical assessment, complete health history, and need for CCM established by Tommy Adams MD.

## 2024-10-11 ENCOUNTER — APPOINTMENT (OUTPATIENT)
Dept: ULTRASOUND IMAGING | Age: 84
End: 2024-10-11
Attending: NURSE PRACTITIONER
Payer: MEDICARE

## 2024-10-11 ENCOUNTER — APPOINTMENT (OUTPATIENT)
Dept: GENERAL RADIOLOGY | Age: 84
End: 2024-10-11
Attending: NURSE PRACTITIONER
Payer: MEDICARE

## 2024-10-11 ENCOUNTER — HOSPITAL ENCOUNTER (OUTPATIENT)
Age: 84
Discharge: HOME | End: 2024-10-11
Payer: MEDICARE

## 2024-10-11 ENCOUNTER — NURSE TRIAGE (OUTPATIENT)
Dept: INTERNAL MEDICINE CLINIC | Facility: CLINIC | Age: 84
End: 2024-10-11

## 2024-10-11 VITALS
OXYGEN SATURATION: 99 % | HEART RATE: 83 BPM | TEMPERATURE: 99 F | SYSTOLIC BLOOD PRESSURE: 171 MMHG | DIASTOLIC BLOOD PRESSURE: 60 MMHG | RESPIRATION RATE: 20 BRPM

## 2024-10-11 DIAGNOSIS — M17.11 PRIMARY OSTEOARTHRITIS OF RIGHT KNEE: Primary | ICD-10-CM

## 2024-10-11 DIAGNOSIS — M71.21 BAKER CYST, RIGHT: ICD-10-CM

## 2024-10-11 PROCEDURE — 99204 OFFICE O/P NEW MOD 45 MIN: CPT | Performed by: NURSE PRACTITIONER

## 2024-10-11 PROCEDURE — 73560 X-RAY EXAM OF KNEE 1 OR 2: CPT | Performed by: NURSE PRACTITIONER

## 2024-10-11 PROCEDURE — 93971 EXTREMITY STUDY: CPT | Performed by: NURSE PRACTITIONER

## 2024-10-11 NOTE — TELEPHONE ENCOUNTER
Spoke with  Shaun  # [id number] to Gilma stated has right lower leg pain 8/10 for 2 days. Patient had been exercising and later in the day felt pain. Patient has been taking Tylenol but the pain has not resolved. Patient denied swelling or redness. Patient cannot identify the specific area of pain. RN inquired if this is calf pain via the  the patient stated \" I cannot tell\". Patient stated Tylenol is moderately effective.     Disposition:     RN informed Gilma if develop chest pain or shortness of breath to go to the emergency department. Patient voiced understanding.      Reason for Disposition   Thigh or calf pain in only one leg and present > 1 hour    Answer Assessment - Initial Assessment Questions  1. ONSET: \"When did the pain start?\"       2 days  2. LOCATION: \"Where is the pain located?\"       Right lower leg   3. PAIN: \"How bad is the pain?\"    (Scale 1-10; or mild, moderate, severe)    -  MILD (1-3): doesn't interfere with normal activities     -  MODERATE (4-7): interferes with normal activities (e.g., work or school) or awakens from sleep, limping     -  SEVERE (8-10): excruciating pain, unable to do any normal activities, unable to walk      8/10  4. WORK OR EXERCISE: \"Has there been any recent work or exercise that involved this part of the body?\"       Post exercise session but patient denied injury.   5. CAUSE: \"What do you think is causing the leg pain?\"      Unsure if exercise.   6. OTHER SYMPTOMS: \"Do you have any other symptoms?\" (e.g., chest pain, back pain, breathing difficulty, swelling, rash, fever, numbness, weakness)      Denied  7. PREGNANCY: \"Is there any chance you are pregnant?\" \"When was your last menstrual period?\"      N/A    Protocols used: Leg Pain-A-[place]

## 2024-10-11 NOTE — TELEPHONE ENCOUNTER
Pt is calling stating that has been having right leg pain, pt mention that has been taking tylenol for the pain but now is getting worse. Pt is asking if could be seen or could prescribe medication that could help.       Please call and advise

## 2024-10-11 NOTE — ED INITIAL ASSESSMENT (HPI)
Patient comes in states that her R leg has become painful from the knee down, started yessenia 3 days wednesday,  started tylenol didn't help much. Pain worse at night

## 2024-10-11 NOTE — ED PROVIDER NOTES
Patient Seen in: [facility]      History     Chief Complaint   Patient presents with    Leg or Foot Injury     Stated Complaint: Leg Pain    Subjective:   HPI    84 year old female presents ambulatory with right leg pain from the knee extending to the mid calf.  No redness or warmth.  No fall.  She does report doing a [employer] class on Wednesday but the pain began when she got home.  No SOB   was utilized  Objective:     Past Medical History:    Basal cell carcinoma (BCC) of right lower extremity    BCC (basal cell carcinoma of skin)    right tibia    Breast cancer (HCC)    s/p R breast mastectomy @ [facility] and on pharmacutical treatment x5 years subsequently (Dr. Harris)    Diabetes (HCC)    diet controlled    Hearing impairment    hard of hearing    High blood pressure    High cholesterol    Hyperlipidemia    Visual impairment    glasses              Past Surgical History:   Procedure Laterality Date    Biopsy of skin lesion      benign results    Cataract      bilateral     Eduardo biopsy stereo nodule 1 site left (cpt=19081) Left 07/05/2023    yasir stereo for calcs  tophat clip    Mastectomy right  2001 2/2 breast CA, with lymph node removal    Skin tissue rearrangement Right 11/22/2019    Wide excision BCC right leg,flap reconstruction                Social History     Socioeconomic History    Marital status:    Tobacco Use    Smoking status: Never     Passive exposure: Never    Smokeless tobacco: Never   Vaping Use    Vaping status: Never Used   Substance and Sexual Activity    Alcohol use: Yes     Comment: socially    Drug use: No   Other Topics Concern    Breast feeding No    Reaction to local anesthetic No    Pt has a pacemaker No    Pt has a defibrillator No     Social Drivers of Health     Financial Resource Strain: Low Risk  (5/24/2024)    Financial Resource Strain     Difficulty of Paying Living Expenses: Not hard at all     Med Affordability: No    Food Insecurity: No Food Insecurity (5/24/2024)    Food Insecurity     Food Insecurity: Never true   Transportation Needs: No Transportation Needs (5/24/2024)    Transportation Needs     Lack of Transportation: No   Physical Activity: Insufficiently Active (5/24/2024)    Exercise Vital Sign     Days of Exercise per Week: 4 days     Minutes of Exercise per Session: 30 min   Stress: No Stress Concern Present (5/24/2024)    Stress     Feeling of Stress : No   Social Connections: Socially Integrated (5/24/2024)    Social Connections     Frequency of Socialization with Friends and Family: 3   Housing Stability: Low Risk  (5/24/2024)    Housing Stability     Housing Instability: No              Review of Systems    Positive for stated complaint: Leg Pain  Other systems are as noted in HPI.  Constitutional and vital signs reviewed.      All other systems reviewed and negative except as noted above.    Physical Exam     ED Triage Vitals [10/11/24 1115]   BP (!) 171/60   Pulse 83   Resp 20   Temp 98.5 °F (36.9 °C)   Temp src Temporal   SpO2 99 %   O2 Device None (Room air)       Current Vitals:   Vital Signs  BP: (!) 171/60  Pulse: 83  Resp: 20  Temp: 98.5 °F (36.9 °C)  Temp src: Temporal    Oxygen Therapy  SpO2: 99 %  O2 Device: None (Room air)        Physical Exam  Vitals and nursing note reviewed.   Constitutional:       Appearance: Normal appearance.   Cardiovascular:      Rate and Rhythm: Normal rate and regular rhythm.      Pulses: Normal pulses.      Heart sounds: Normal heart sounds.   Pulmonary:      Breath sounds: Normal breath sounds.   Musculoskeletal:         General: Tenderness (Nonspecific tenderness to the anterior and posterior right knee mild tenderness extending to the mid calf with movement full range of motion normal straight leg raise no laxity of the knee) present. No swelling or deformity. Normal range of motion.   Skin:     General: Skin is warm and dry.      Capillary Refill: Capillary refill  takes less than 2 seconds.      Findings: No bruising, lesion or rash.   Neurological:      Mental Status: She is alert and oriented to person, place, and time.      Motor: No weakness.           ED Course   Labs Reviewed - No data to display  X-ray rule out bony process ultrasound rule out DVT              MDM        Sprain strain contusion meniscus tear internal derangement, Baker's cyst, considered fracture DVT   X-ray was personally reviewed there is no obvious fracture or arthritis noted  Negative DVT study  Support care ice alternate heat, Voltaren gel, Tylenol PMD Ortho follow-up        Medical Decision Making  Problems Addressed:  Baker cyst, right: acute illness or injury  Primary osteoarthritis of right knee: acute illness or injury    Amount and/or Complexity of Data Reviewed  Radiology: ordered and independent interpretation performed. Decision-making details documented in ED Course.    Risk  OTC drugs.  Prescription drug management.        Disposition and Plan     Clinical Impression:  1. Primary osteoarthritis of right knee    2. Elias cyst, right         Disposition:  Discharge  10/11/2024  1:05 pm    Follow-up:  Tommy Adams MD  [address] [address]  [phone]    Schedule an appointment as soon as possible for a visit in 1 week            Medications Prescribed:  Discharge Medication List as of 10/11/2024  1:12 PM        START taking these medications    Details   diclofenac (VOLTAREN ARTHRITIS PAIN) 1 % External Gel Apply 2 g topically 4 (four) times daily., Normal, Disp-50 g, R-0                 Supplementary Documentation:

## 2024-10-14 ENCOUNTER — PATIENT OUTREACH (OUTPATIENT)
Dept: CASE MANAGEMENT | Age: 84
End: 2024-10-14

## 2024-10-14 ENCOUNTER — TELEPHONE (OUTPATIENT)
Dept: INTERNAL MEDICINE CLINIC | Facility: CLINIC | Age: 84
End: 2024-10-14

## 2024-10-14 NOTE — TELEPHONE ENCOUNTER
Patient went to  for her leg pain. She did not get anything for her pain. She wants to be seen this week after Dr. Adams gets back. Please advise.

## 2024-10-14 NOTE — TELEPHONE ENCOUNTER
Spoke with  [place] # [id number] to Gilma who stated her right knee pain is not controlled with Tylenol and home care measures provided by .  Patient agreed to appointment 10/18/24 at 10:40 AM with Dr. Adams.

## 2024-10-14 NOTE — PROGRESS NOTES
The patient had call while [facility] was out of town but no v/m was left.    [facility] called patient back but didn't answer me, message left to call me back.    Total time - 3 min  Total Monthly time- 3 min        The patient called back and stated she was seen at the  for severe knee pain, per patient an appointment has been scheduled to see her pcp this week but her knee is still bothering, she was not aware that a Rx for Diclofenac gel was authorized to [facility] and that she usually receives text messages from pharmacy when Rx are ready.  [facility] offered to call her pharmacy and patient accepted.  12 mins with patient.    CCM made a call to [facility] pharmacy, per Mayte they needed to order it but it will be ready by tomorrow in the afertnoon, she will have a copay of 12.95.  2 mins w/[facility] notified the patient's  of the outcome and will be waiting on pharmacy's text to get the rx.  1 min w/patient    Total time - 15 min  Total Monthly time- 18 min

## 2024-10-18 ENCOUNTER — OFFICE VISIT (OUTPATIENT)
Dept: INTERNAL MEDICINE CLINIC | Facility: CLINIC | Age: 84
End: 2024-10-18
Payer: COMMERCIAL

## 2024-10-18 VITALS
HEIGHT: 56 IN | WEIGHT: 101 LBS | OXYGEN SATURATION: 100 % | SYSTOLIC BLOOD PRESSURE: 128 MMHG | HEART RATE: 90 BPM | DIASTOLIC BLOOD PRESSURE: 50 MMHG | BODY MASS INDEX: 22.72 KG/M2

## 2024-10-18 DIAGNOSIS — M17.11 PRIMARY OSTEOARTHRITIS OF RIGHT KNEE: Primary | ICD-10-CM

## 2024-10-18 PROCEDURE — 99214 OFFICE O/P EST MOD 30 MIN: CPT | Performed by: INTERNAL MEDICINE

## 2024-10-18 PROCEDURE — G2211 COMPLEX E/M VISIT ADD ON: HCPCS | Performed by: INTERNAL MEDICINE

## 2024-10-18 PROCEDURE — 1159F MED LIST DOCD IN RCRD: CPT | Performed by: INTERNAL MEDICINE

## 2024-10-18 PROCEDURE — 3074F SYST BP LT 130 MM HG: CPT | Performed by: INTERNAL MEDICINE

## 2024-10-18 PROCEDURE — 3078F DIAST BP <80 MM HG: CPT | Performed by: INTERNAL MEDICINE

## 2024-10-18 PROCEDURE — 3008F BODY MASS INDEX DOCD: CPT | Performed by: INTERNAL MEDICINE

## 2024-10-18 PROCEDURE — 1160F RVW MEDS BY RX/DR IN RCRD: CPT | Performed by: INTERNAL MEDICINE

## 2024-10-18 RX ORDER — CELECOXIB 200 MG/1
200 CAPSULE ORAL 2 TIMES DAILY PRN
Qty: 60 CAPSULE | Refills: 1 | Status: SHIPPED | OUTPATIENT
Start: 2024-10-18

## 2024-10-24 ENCOUNTER — PATIENT OUTREACH (OUTPATIENT)
Dept: CASE MANAGEMENT | Age: 84
End: 2024-10-24

## 2024-10-24 DIAGNOSIS — M85.89 OSTEOPENIA OF MULTIPLE SITES: ICD-10-CM

## 2024-10-24 DIAGNOSIS — M25.512 CHRONIC PAIN OF BOTH SHOULDERS: ICD-10-CM

## 2024-10-24 DIAGNOSIS — M25.511 CHRONIC PAIN OF BOTH SHOULDERS: ICD-10-CM

## 2024-10-24 DIAGNOSIS — I10 ESSENTIAL HYPERTENSION: ICD-10-CM

## 2024-10-24 DIAGNOSIS — Z90.11 STATUS POST RIGHT MASTECTOMY: ICD-10-CM

## 2024-10-24 DIAGNOSIS — H40.003 GLAUCOMA SUSPECT OF BOTH EYES: ICD-10-CM

## 2024-10-24 DIAGNOSIS — E11.3293 CONTROLLED TYPE 2 DIABETES MELLITUS WITH BOTH EYES AFFECTED BY MILD NONPROLIFERATIVE RETINOPATHY WITHOUT MACULAR EDEMA, WITHOUT LONG-TERM CURRENT USE OF INSULIN (HCC): Primary | ICD-10-CM

## 2024-10-24 DIAGNOSIS — G89.29 CHRONIC PAIN OF BOTH SHOULDERS: ICD-10-CM

## 2024-10-24 DIAGNOSIS — Z85.3 PERSONAL HISTORY OF BREAST CANCER: ICD-10-CM

## 2024-10-24 DIAGNOSIS — Z85.828 HISTORY OF BASAL CELL CARCINOMA: ICD-10-CM

## 2024-10-24 DIAGNOSIS — E78.00 HYPERCHOLESTEREMIA: ICD-10-CM

## 2024-10-24 NOTE — PROGRESS NOTES
[facility]  Return Patient Progress Note      HPI:     Chief Complaint   Patient presents with    Knee Pain     Right knee pain     Blood Pressure     Check /50       Gilma Ghotra is a 84 year old female presenting for:    Has a significant  has a past medical history of Basal cell carcinoma (BCC) of right lower extremity (11/4/2019), BCC (basal cell carcinoma of skin) (2019), Breast cancer (HCC) (2001), Diabetes (HCC), Hearing impairment, High blood pressure, High cholesterol, Hyperlipidemia, and Visual impairment.    Right knee pain.  Would like to discuss treatment options.        Labs:   CMP:  Lab Results   Component Value Date/Time     (L) 08/24/2024 08:54 AM    K 4.5 08/24/2024 08:54 AM     08/24/2024 08:54 AM    CO2 27.0 08/24/2024 08:54 AM    CREATSERUM 0.62 08/24/2024 08:54 AM    CA 10.1 08/24/2024 08:54 AM     (H) 08/24/2024 08:54 AM    TP 7.6 08/24/2024 08:54 AM    ALB 4.7 08/24/2024 08:54 AM    ALKPHO 111 08/24/2024 08:54 AM    AST 23 08/24/2024 08:54 AM    ALT 16 08/24/2024 08:54 AM    BILT 0.9 08/24/2024 08:54 AM        CBC:  Lab Results   Component Value Date    WBC 7.1 08/24/2024    HGB 13.7 08/24/2024    HCT 40.6 08/24/2024    .0 08/24/2024    NEPERCENT 52.2 08/24/2024    LYPERCENT 37.3 08/24/2024    MOPERCENT 8.2 08/24/2024    EOPERCENT 1.8 08/24/2024    BAPERCENT 0.4 08/24/2024    NE 3.68 08/24/2024    LYMABS 2.63 08/24/2024    MOABSO 0.58 08/24/2024    EOABSO 0.13 08/24/2024    BAABSO 0.03 08/24/2024          Hemoglobin A1C, Microalbumin  Lab Results   Component Value Date/Time    A1C 6.4 (H) 08/24/2024 08:54 AM        Lipid panel  Lab Results   Component Value Date/Time    CHOLEST 142 02/19/2024 08:55 AM    HDL 57 02/19/2024 08:55 AM    TRIG 74 02/19/2024 08:55 AM    LDL 70 02/19/2024 08:55 AM    NONHDLC 85 02/19/2024 08:55 AM        Medications:  Current Outpatient Medications   Medication Sig Dispense Refill    celecoxib  (CELEBREX) 200 MG Oral Cap Take 1 capsule (200 mg total) by mouth 2 (two) times daily as needed for Pain. 60 capsule 1    diclofenac (VOLTAREN ARTHRITIS PAIN) 1 % External Gel Apply 2 g topically 4 (four) times daily. 50 g 0    fexofenadine (ALLEGRA ALLERGY) 180 MG Oral Tab Take 1 tablet (180 mg total) by mouth daily. 90 tablet 1    triamcinolone 0.1 % External Cream Apply topically 2 (two) times daily as needed. 60 g 3    ketorolac 0.5 % Ophthalmic Solution       Multiple Vitamin (MULTI-VITAMIN) Oral Tab Take 1 tablet by mouth daily.      ATORVASTATIN 10 MG Oral Tab TOME MEDIA TABLETA POR LA BOCA  CADA NOCHE 50 tablet 2    enalapril 2.5 MG Oral Tab Take 1 tablet (2.5 mg total) by mouth daily. 100 tablet 2      PMH:  Past Medical History:    Basal cell carcinoma (BCC) of right lower extremity    BCC (basal cell carcinoma of skin)    right tibia    Breast cancer ([facility])    s/p R breast mastectomy @ [facility] and on pharmacutical treatment x5 years subsequently (Dr. Harris)    Diabetes ([facility])    diet controlled    Hearing impairment    hard of hearing    High blood pressure    High cholesterol    Hyperlipidemia    Visual impairment    glasses               REVIEW OF SYSTEMS:   Review of Systems   Constitutional:  Negative for chills, fatigue, fever and unexpected weight change.   HENT:  Negative for congestion, ear pain, hearing loss, rhinorrhea, sinus pain and sore throat.    Eyes:  Negative for pain, redness and visual disturbance.   Respiratory:  Negative for apnea, cough, chest tightness, shortness of breath and wheezing.    Cardiovascular:  Negative for chest pain, palpitations and leg swelling.   Gastrointestinal:  Negative for abdominal distention, abdominal pain, blood in stool, constipation and nausea.   Endocrine: Negative for cold intolerance, heat intolerance and polyuria.   Genitourinary:  Negative for dysuria, hematuria and urgency.   Musculoskeletal:  Negative for arthralgias, back pain,  gait problem, joint swelling, myalgias and neck pain.   Skin:  Negative for rash and wound.   Allergic/Immunologic: Negative for food allergies and immunocompromised state.   Neurological:  Negative for dizziness, seizures, facial asymmetry, speech difficulty, weakness, light-headedness, numbness and headaches.   Hematological:  Negative for adenopathy. Does not bruise/bleed easily.   Psychiatric/Behavioral:  Negative for behavioral problems, sleep disturbance and suicidal ideas. The patient is not nervous/anxious.             PHYSICAL EXAM:   /50   Pulse 90   Ht 4' 8\" (1.422 m)   Wt 101 lb (45.8 kg)   SpO2 100%   BMI 22.64 kg/m²  Estimated body mass index is 22.64 kg/m² as calculated from the following:    Height as of this encounter: 4' 8\" (1.422 m).    Weight as of this encounter: 101 lb (45.8 kg).     Wt Readings from Last 3 Encounters:   10/18/24 101 lb (45.8 kg)   08/28/24 102 lb (46.3 kg)   02/28/24 103 lb (46.7 kg)       Physical Exam  Vitals reviewed.   Constitutional:       General: She is not in acute distress.     Appearance: She is well-developed.   HENT:      Head: Normocephalic and atraumatic.   Eyes:      Conjunctiva/sclera: Conjunctivae normal.      Pupils: Pupils are equal, round, and reactive to light.   Neck:      Thyroid: No thyromegaly.   Cardiovascular:      Rate and Rhythm: Normal rate and regular rhythm.      Heart sounds: Normal heart sounds, S1 normal and S2 normal. No murmur heard.     No friction rub. No gallop.   Pulmonary:      Effort: Pulmonary effort is normal. No respiratory distress.      Breath sounds: Normal breath sounds. No wheezing or rales.   Chest:      Chest wall: No tenderness.   Abdominal:      General: Bowel sounds are normal. There is no distension.      Palpations: Abdomen is soft. There is no mass.      Tenderness: There is no abdominal tenderness. There is no guarding or rebound.   Musculoskeletal:         General: Tenderness present. Normal range of  motion.      Cervical back: Normal range of motion.   Lymphadenopathy:      Cervical: No cervical adenopathy.   Skin:     General: Skin is warm.      Findings: No erythema or rash.   Neurological:      Mental Status: She is alert and oriented to person, place, and time.      Cranial Nerves: No cranial nerve deficit.      Deep Tendon Reflexes: Reflexes are normal and symmetric.   Psychiatric:         Behavior: Behavior normal.         Thought Content: Thought content normal.         Judgment: Judgment normal.               ASSESSMENT AND PLAN:   Patient is a 84 year old female who presents primarily presents for:    (M17.11) Primary osteoarthritis of right knee  (primary encounter diagnosis)  Plan: Discussed several options.  Declining to see physiatry.  Will trial celebrex.              Health Maintenance:    Health Maintenance   Topic Date Due    Zoster Vaccines (1 of 2) Never done    COVID-19 Vaccine (6 - 2023-24 season) 09/01/2024    Influenza Vaccine (1) 10/01/2024    Colorectal Cancer Screening  07/29/2025 (Originally 1/7/1940)    Diabetes Care A1C  02/24/2025    Diabetes Care Dilated Eye Exam  05/30/2025    Diabetes Care: GFR  08/24/2025    Diabetes Care: Microalb/Creat Ratio  08/24/2025    Diabetes Care Foot Exam  08/28/2025    DEXA Scan  Completed    MA Annual Health Assessment  Completed    Annual Depression Screening  Completed    Fall Risk Screening (Annual)  Completed    Pneumococcal Vaccine: 65+ Years  Completed         Meds & Refills for this Visit:  Requested Prescriptions     Signed Prescriptions Disp Refills    celecoxib (CELEBREX) 200 MG Oral Cap 60 capsule 1     Sig: Take 1 capsule (200 mg total) by mouth 2 (two) times daily as needed for Pain.       No orders of the defined types were placed in this encounter.      Imaging & Consults:  None          Return in about 3 months (around 1/18/2025).  Important follow up notes/labs for next visit      Patient indicates understanding of the above  recommendations and agrees to the above plan.  Reasurrance and education provided. All questions answered.    Notified to call with any questions, complications, allergies, or worsening or changing symptoms as well as any side effects or complications from the treatments .  Red flags/ ER precautions discussed.    If diagnostic labs or imaging ordered advised patient to contact my office for results  24-48 hours after completion    This note was dictated using dragon speech recognition transcription software.  Typographical and transcription errors may be present.  Please call if any questions.             Tommy Adams MD  [facility]

## 2024-10-24 NOTE — PROGRESS NOTES
10/24/2024  Spoke to Gilma for CCM.      Updates to patient care team/comments:  UTD/Reviewed with patient  Patient reported changes in medications: None   Med Adherence  Comment: taking as prescribed     Health Maintenance: UTD/Reviewed with patient  Health Maintenance   Topic Date Due    Zoster Vaccines (1 of 2) Never done the patient refused     COVID-19 Vaccine (6 - 2023-24 season) 09/01/2024 reminded to get from local pharmacy    Influenza Vaccine (1) 10/01/2024 reminded to get from local pharmacy    Colorectal Cancer Screening  07/29/2025 (Originally 1/7/1940)    Diabetes Care A1C  02/24/2025    Diabetes Care Dilated Eye Exam  05/30/2025    Diabetes Care: GFR  08/24/2025    Diabetes Care: Microalb/Creat Ratio  08/24/2025    Diabetes Care Foot Exam  08/28/2025    DEXA Scan  Completed    MA Annual Health Assessment  Completed    Annual Depression Screening  Completed    Fall Risk Screening (Annual)  Completed    Pneumococcal Vaccine: 65+ Years  Completed       Patient updates/concerns:     The patient is experiencing knee pain, she was seen in the  facility on 10/11, xray of the knee and ultrasound were done they both came back with normal results.    The patient was seen by PCP as a follow up to her  visit, she was seen on 10/18/24, she was recommended physical therapy but declined at the time of her visit, now she wants to try it and wants [facility] to request such order wither PCP's office.  ASSESSMENT AND PLAN:   Patient is a 84 year old female who presents primarily presents for:     (M17.11) Primary osteoarthritis of right knee  (primary encounter diagnosis)  Plan: Discussed several options.  Declining to see physiatry.  Will trial celebrex.    The patient is refusing to start Celebrex because she was told by her PCP that it might harm her kidney so she decided not to use it, she is currently using Diclofenac Gel and it seems to help her but still has some pain, she would like for [facility] to make a request to  her PCP for a refill as well.  Goals/Action Plan:    Active goal from previous outreach: the patient would like to be able to put weight on her knee without pain.    Patient reported progress towards goals: ongoing and working towards goal               - What: feel less pain, be able to bear more weight on her knee           - Where/When/How: the patient would like to start PT and also continue using diclofenac gel.  Patient Reported Barriers and Concerns: None                   - Plan for overcoming barriers: N/A    Care Managers Interventions:   CCM requested PCP to authorize patient an order to start physical therapy.    CCM requested PCP to authorize a refill on patient's Voltaren gel.    CCM to help patient set up appointment with PT once order is entered by PCP.    CCM ecouraged the patient to take the time to do the things she likes and enjoys, continue a balanced diet and good nutrition to stay healthy an prevent further health complications.    CCM motivated the patient to stay active and maintain a balance physical activity    Future Appointments:   Future Appointments   Date Time Provider Department Center   3/6/2025 10:20 AM Tommy Adams MD [id number]         Next Care Manager Follow Up Date: in a month or sooner if needed.    Reason For Follow Up: review progress and or barriers towards patient's goals.     Time Spent This Encounter Total: 13 min medical record review, telephone communication, care plan updates where needed, education, goals, and action plan recreation/update. Provided acknowledgment and validation to patient's concerns.   Monthly Minute Total including today: 31  Physical assessment, complete health history, and need for CCM established by Tommy Adams MD.

## 2024-10-25 DIAGNOSIS — G89.29 CHRONIC PAIN OF BOTH SHOULDERS: ICD-10-CM

## 2024-10-25 DIAGNOSIS — M25.512 CHRONIC PAIN OF BOTH SHOULDERS: ICD-10-CM

## 2024-10-25 DIAGNOSIS — M25.511 CHRONIC PAIN OF BOTH SHOULDERS: ICD-10-CM

## 2024-10-25 DIAGNOSIS — M17.11 PRIMARY OSTEOARTHRITIS OF RIGHT KNEE: Primary | ICD-10-CM

## 2024-10-25 NOTE — PROGRESS NOTES
Referral order was signed by PCP, refill was approved to [facility].    The patient was notified of the above  5 mins with patient.  Total time - 5 min  Total Monthly time- 36 min
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
## 2024-12-02 RX ORDER — CELECOXIB 200 MG/1
CAPSULE ORAL
Qty: 120 CAPSULE | Refills: 5 | Status: SHIPPED | OUTPATIENT
Start: 2024-12-02

## 2024-12-04 ENCOUNTER — TELEPHONE (OUTPATIENT)
Dept: INTERNAL MEDICINE CLINIC | Facility: CLINIC | Age: 84
End: 2024-12-04

## 2024-12-04 ENCOUNTER — PATIENT OUTREACH (OUTPATIENT)
Dept: CASE MANAGEMENT | Age: 84
End: 2024-12-04

## 2024-12-04 DIAGNOSIS — Z85.3 PERSONAL HISTORY OF BREAST CANCER: ICD-10-CM

## 2024-12-04 DIAGNOSIS — G89.29 CHRONIC PAIN OF BOTH SHOULDERS: Primary | ICD-10-CM

## 2024-12-04 DIAGNOSIS — I10 ESSENTIAL HYPERTENSION: ICD-10-CM

## 2024-12-04 DIAGNOSIS — M25.511 CHRONIC PAIN OF BOTH SHOULDERS: ICD-10-CM

## 2024-12-04 DIAGNOSIS — M25.511 CHRONIC PAIN OF BOTH SHOULDERS: Primary | ICD-10-CM

## 2024-12-04 DIAGNOSIS — M85.89 OSTEOPENIA OF MULTIPLE SITES: ICD-10-CM

## 2024-12-04 DIAGNOSIS — E78.00 HYPERCHOLESTEREMIA: ICD-10-CM

## 2024-12-04 DIAGNOSIS — M25.512 CHRONIC PAIN OF BOTH SHOULDERS: Primary | ICD-10-CM

## 2024-12-04 DIAGNOSIS — E11.3293 CONTROLLED TYPE 2 DIABETES MELLITUS WITH BOTH EYES AFFECTED BY MILD NONPROLIFERATIVE RETINOPATHY WITHOUT MACULAR EDEMA, WITHOUT LONG-TERM CURRENT USE OF INSULIN (HCC): Primary | ICD-10-CM

## 2024-12-04 DIAGNOSIS — M17.11 PRIMARY OSTEOARTHRITIS OF RIGHT KNEE: ICD-10-CM

## 2024-12-04 DIAGNOSIS — H40.003 GLAUCOMA SUSPECT OF BOTH EYES: ICD-10-CM

## 2024-12-04 DIAGNOSIS — G89.29 CHRONIC PAIN OF BOTH SHOULDERS: ICD-10-CM

## 2024-12-04 DIAGNOSIS — Z85.828 HISTORY OF BASAL CELL CARCINOMA: ICD-10-CM

## 2024-12-04 DIAGNOSIS — M25.512 CHRONIC PAIN OF BOTH SHOULDERS: ICD-10-CM

## 2024-12-04 NOTE — PROGRESS NOTES
12/4/2024  Spoke to Gilma for CCM.      Updates to patient care team/comments:  UTD/Reviewed with patient  Patient reported changes in medications: None   Med Adherence  Comment: taking as prescribed      Health Maintenance: UTD/Reviewed with patient  Health Maintenance   Topic Date Due    Zoster Vaccines (1 of 2) Never done refuses to get    COVID-19 Vaccine (6 - 2024-25 season) 09/01/2024 refuses to get    Influenza Vaccine (1) 10/01/2024 refuses to get    Colorectal Cancer Screening  07/29/2025     Diabetes Care A1C  02/24/2025    Diabetes Care Dilated Eye Exam  05/30/2025    Diabetes Care: GFR  08/24/2025    Diabetes Care: Microalb/Creat Ratio  08/24/2025    Diabetes Care Foot Exam  08/28/2025    DEXA Scan  Completed    MA Annual Health Assessment  Completed    Annual Depression Screening  Completed    Fall Risk Screening (Annual)  Completed    Pneumococcal Vaccine: 65+ Years  Completed       Patient updates/concerns:     The patient reports to be doing well overall, she is happy her knee and shoulder pain are finally getting better.    The patient is receiving physical therapy on her shoulder and her leg/knee, the patient is reporting much improvement on her pains. The patient is about to finish eight sessions that were authorized by PCP, she was told by therapist that is possible she might need another two sessions, the patient is asking CCM to make the request to her PCP for a referral for additional visits.    The patient is due to get the influenza vaccine but she declined getting it.    The patient is no longer using Celebrex and she also stopped the allegra, it was causing her a lot of drowsiness.     Goals/Action Plan:    Active goal from previous outreach:  the patient is happy physical therapy is helping with her pain    Patient reported progress towards goals: ongoing and working towards goal               - What: finish therapy and feel no pain           - Where/When/How: continue with scheduled  physical therapy   Patient Reported Barriers and Concerns: None                   - Plan for overcoming barriers: N/A    Care Managers Interventions:     CCM was able to obtain the patient a referral to continue physical therapy, four additional visits were approved by PCP.    CCM reminded the patient to get vaccinated against the influenza but she refuses and admits that she wont get it.    Advised patient to focus on eating healthier meals, keep doctor's appointment, and increase exercise.     Continued to provide education on how to better manage and cope with chronic conditions. Informed of the importance on reporting any new symptoms to prevent any health complications    CCM motivates patient to stay active and be as productive as possible to stay healthy and prevent further health complications.    Reconciled the patient's chart using care everywhere.    Updated health maintenance: Recommended patient about getting shingrix, influenza and covid vaccine from local pharmacy but she declined getting them.    Immunization Query completed: No updates available.    Encouraged patient to call as needed.     Future Appointments:   Future Appointments   Date Time Provider Department Center   3/6/2025 10:20 AM Tommy Adams MD [id number]       Next Care Manager Follow Up Date: in a month or sooner     Reason For Follow Up: review progress and or barriers towards patient's goals.     Time Spent This Encounter Total: 26 min medical record review, telephone communication, care plan updates where needed, education, goals, and action plan recreation/update. Provided acknowledgment and validation to patient's concerns.   Monthly Minute Total including today: 26  Physical assessment, complete health history, and need for CCM established by Tommy Adams MD.

## 2024-12-04 NOTE — TELEPHONE ENCOUNTER
The patient was contacted for CCM monthly call.  The patient would like for CCM to make a request to PCP regarding a referral for extra visit on her physical therapy.     5

## 2024-12-06 NOTE — TELEPHONE ENCOUNTER
The patient was notified about referral being approved, she is already scheduled for additional therapy starting on Monday 12/9.

## 2025-01-06 ENCOUNTER — PATIENT OUTREACH (OUTPATIENT)
Dept: CASE MANAGEMENT | Age: 85
End: 2025-01-06

## 2025-01-06 DIAGNOSIS — M85.89 OSTEOPENIA OF MULTIPLE SITES: ICD-10-CM

## 2025-01-06 DIAGNOSIS — Z85.3 PERSONAL HISTORY OF BREAST CANCER: ICD-10-CM

## 2025-01-06 DIAGNOSIS — E11.3293 CONTROLLED TYPE 2 DIABETES MELLITUS WITH BOTH EYES AFFECTED BY MILD NONPROLIFERATIVE RETINOPATHY WITHOUT MACULAR EDEMA, WITHOUT LONG-TERM CURRENT USE OF INSULIN (HCC): Primary | ICD-10-CM

## 2025-01-06 DIAGNOSIS — E78.00 HYPERCHOLESTEREMIA: ICD-10-CM

## 2025-01-06 DIAGNOSIS — I10 ESSENTIAL HYPERTENSION: ICD-10-CM

## 2025-01-06 DIAGNOSIS — Z85.828 HISTORY OF BASAL CELL CARCINOMA: ICD-10-CM

## 2025-01-06 NOTE — PROGRESS NOTES
1/6/2025 and 01/09  Spoke to Gilma for CCM.      Updates to patient care team/comments:  UTD/Reviewed with patient  Patient reported changes in medications: None   Med Adherence  Comment: taking as prescribed     Health Maintenance: UTD/Reviewed with patient  Health Maintenance   Topic Date Due    COVID-19 Vaccine (6 - 2024-25 season) 09/01/2024 declined     Annual Well Visit  01/01/2025    Annual Depression Screening  01/01/2025    Fall Risk Screening (Annual)  01/01/2025    Diabetes Care: Foot Exam (Annual)  01/01/2025    Diabetes Care: Microalb/Creat Ratio (Annual)  01/01/2025    Influenza Vaccine (1) 06/30/2025 (Originally 10/1/2024)    Colorectal Cancer Screening  07/29/2025 (Originally 1/7/1940)    Zoster Vaccines (1 of 2) 01/04/2026 (Originally 1/7/1991)    Diabetes Care A1C  02/24/2025    Diabetes Care Dilated Eye Exam  05/30/2025    Diabetes Care: GFR  08/24/2025    DEXA Scan  Completed    Pneumococcal Vaccine: 65+ Years  Completed       Patient updates/concerns:     The patient recently fell at home, she fell on carpet. The patient is continuing physical therapy because of recent fall. The patient was already receiving physical therapy for something else but now that she injured her self with recent incident she is to continue with it. Gilma was evaluated by physical therapy and was determined that she has no fractures but she is complaining of pain and some soreness, she is able to walk but pain bother her at times, she is only using Tylenol but is sporadically. She is getting physical therapy twice a week.    The patient was referred by her physical therapist to an orthopaedic doctor and will be seeing him 02/03/2025.     The patient otherwise reports to be doing fine and feeling good, she is taking all of her medications as prescribed, she denies any feet edema, she is sleeping good and has no concerns with it.    Goals/Action Plan:    Active goal from previous outreach:  be able to walk with no  pain    Patient reported progress towards goals: ongoing and working towards goal               - What: be able to walk with no pain           - Where/When/How: continue with physical therapy sessions.  Patient Reported Barriers and Concerns: None                   - Plan for overcoming barriers: N/A    Care Managers Interventions:     CCM encourage the patient to keep her self moving around the house as tolerated and do home exercises that are recommended by physical therapy to have a better and faster recovery.    CCM performed depression screen and fall assessment as well as the SDOH.    Continued to provide education on how to better manage and cope with chronic conditions. Informed of the importance on reporting any new symptoms to prevent any health complications    CCM encouraged patient to take the time to enjoy the things she likes to do, to not feel guilty because of her incident when she fell at home.    CCM motivates patient to stay active and be as productive as possible to stay healthy and prevent further health complications.    Reconciled the patient's chart using care everywhere.    Updated health maintenance: Recommended patient about getting shingrix and covid vaccine from local pharmacy but she refuses to get.    Immunization Query completed: No updates available.    Encouraged patient to call as needed.     Future Appointments:   Future Appointments   Date Time Provider Department Center   3/6/2025 10:20 AM Tommy Adams MD [id number]       Next Care Manager Follow Up Date: in a month or sooner if needed.    Reason For Follow Up: review progress and or barriers towards patient's goals.     Time Spent This Encounter Total: 28 min medical record review, telephone communication, care plan updates where needed, education, goals, and action plan recreation/update. Provided acknowledgment and validation to patient's concerns.   Monthly Minute Total including today: 28  Physical assessment,  complete health history, and need for CCM established by Tommy Adams MD.

## 2025-01-13 NOTE — PROGRESS NOTES
The patient reached out to [facility] about her foot pain, she prefers to see her PCP to evaluate her and possibly give her orders for a specialist or imaging.    [facility] was able to schedule her an appointment to see Dr Adams 01/15/2025 at 840, she accepted it.  9 mins with Gilma.    The patient is unsure if she will keep a current appointment she has already scheduled with an ortho for 02/3/25 and will inform [facility] to cancel if needed, depending on what her PCP tell her on Wednesday.

## 2025-01-15 ENCOUNTER — OFFICE VISIT (OUTPATIENT)
Dept: INTERNAL MEDICINE CLINIC | Facility: CLINIC | Age: 85
End: 2025-01-15
Payer: COMMERCIAL

## 2025-01-15 VITALS
HEIGHT: 56 IN | WEIGHT: 102 LBS | OXYGEN SATURATION: 99 % | HEART RATE: 91 BPM | BODY MASS INDEX: 22.95 KG/M2 | SYSTOLIC BLOOD PRESSURE: 138 MMHG | DIASTOLIC BLOOD PRESSURE: 66 MMHG

## 2025-01-15 DIAGNOSIS — S89.91XA INJURY OF RIGHT KNEE, INITIAL ENCOUNTER: Primary | ICD-10-CM

## 2025-01-15 PROCEDURE — 1160F RVW MEDS BY RX/DR IN RCRD: CPT | Performed by: INTERNAL MEDICINE

## 2025-01-15 PROCEDURE — 99213 OFFICE O/P EST LOW 20 MIN: CPT | Performed by: INTERNAL MEDICINE

## 2025-01-15 PROCEDURE — 3075F SYST BP GE 130 - 139MM HG: CPT | Performed by: INTERNAL MEDICINE

## 2025-01-15 PROCEDURE — 1159F MED LIST DOCD IN RCRD: CPT | Performed by: INTERNAL MEDICINE

## 2025-01-15 PROCEDURE — 1125F AMNT PAIN NOTED PAIN PRSNT: CPT | Performed by: INTERNAL MEDICINE

## 2025-01-15 PROCEDURE — 3008F BODY MASS INDEX DOCD: CPT | Performed by: INTERNAL MEDICINE

## 2025-01-15 PROCEDURE — 3078F DIAST BP <80 MM HG: CPT | Performed by: INTERNAL MEDICINE

## 2025-01-27 NOTE — PROGRESS NOTES
[facility] part of [facility]  Return Patient Progress Note      HPI:     Chief Complaint   Patient presents with    Fall       Gilma Gohtra is a 85 year old female presenting for:    Has a significant  has a past medical history of Basal cell carcinoma (BCC) of right lower extremity (11/4/2019), BCC (basal cell carcinoma of skin) (2019), Breast cancer (HCC) (2001), Diabetes (HCC), Hearing impairment, High blood pressure, High cholesterol, Hyperlipidemia, and Visual impairment.    Here for follow up.  Had a fall in October     Labs:   CMP:  Lab Results   Component Value Date/Time     (L) 08/24/2024 08:54 AM    K 4.5 08/24/2024 08:54 AM     08/24/2024 08:54 AM    CO2 27.0 08/24/2024 08:54 AM    CREATSERUM 0.62 08/24/2024 08:54 AM    CA 10.1 08/24/2024 08:54 AM     (H) 08/24/2024 08:54 AM    TP 7.6 08/24/2024 08:54 AM    ALB 4.7 08/24/2024 08:54 AM    ALKPHO 111 08/24/2024 08:54 AM    AST 23 08/24/2024 08:54 AM    ALT 16 08/24/2024 08:54 AM    BILT 0.9 08/24/2024 08:54 AM        CBC:  Lab Results   Component Value Date    WBC 7.1 08/24/2024    HGB 13.7 08/24/2024    HCT 40.6 08/24/2024    .0 08/24/2024    NEPERCENT 52.2 08/24/2024    LYPERCENT 37.3 08/24/2024    MOPERCENT 8.2 08/24/2024    EOPERCENT 1.8 08/24/2024    BAPERCENT 0.4 08/24/2024    NE 3.68 08/24/2024    LYMABS 2.63 08/24/2024    MOABSO 0.58 08/24/2024    EOABSO 0.13 08/24/2024    BAABSO 0.03 08/24/2024          Hemoglobin A1C, Microalbumin  Lab Results   Component Value Date/Time    A1C 6.4 (H) 08/24/2024 08:54 AM        Lipid panel  Lab Results   Component Value Date/Time    CHOLEST 142 02/19/2024 08:55 AM    HDL 57 02/19/2024 08:55 AM    TRIG 74 02/19/2024 08:55 AM    LDL 70 02/19/2024 08:55 AM    NONHDLC 85 02/19/2024 08:55 AM        Medications:  Current Outpatient Medications   Medication Sig Dispense Refill    diclofenac (VOLTAREN ARTHRITIS PAIN) 1 % External Gel Apply 2 g topically 4 (four) times  daily. 100 g 1    triamcinolone 0.1 % External Cream Apply topically 2 (two) times daily as needed. 60 g 3    ketorolac 0.5 % Ophthalmic Solution       Multiple Vitamin (MULTI-VITAMIN) Oral Tab Take 1 tablet by mouth daily.      ATORVASTATIN 10 MG Oral Tab TOME MEDIA TABLETA POR LA BOCA  CADA NOCHE 50 tablet 2    enalapril 2.5 MG Oral Tab Take 1 tablet (2.5 mg total) by mouth daily. 100 tablet 2      PMH:  Past Medical History:    Basal cell carcinoma (BCC) of right lower extremity    BCC (basal cell carcinoma of skin)    right tibia    Breast cancer ([facility])    s/p R breast mastectomy @ [facility] and on pharmacutical treatment x5 years subsequently (Dr. Harris)    Diabetes ([facility])    diet controlled    Hearing impairment    hard of hearing    High blood pressure    High cholesterol    Hyperlipidemia    Visual impairment    glasses               REVIEW OF SYSTEMS:   Review of Systems   Constitutional:  Negative for chills, fatigue, fever and unexpected weight change.   HENT:  Negative for congestion, ear pain, hearing loss, rhinorrhea, sinus pain and sore throat.    Eyes:  Negative for pain, redness and visual disturbance.   Respiratory:  Negative for apnea, cough, chest tightness, shortness of breath and wheezing.    Cardiovascular:  Negative for chest pain, palpitations and leg swelling.   Gastrointestinal:  Negative for abdominal distention, abdominal pain, blood in stool, constipation and nausea.   Endocrine: Negative for cold intolerance, heat intolerance and polyuria.   Genitourinary:  Negative for dysuria, hematuria and urgency.   Musculoskeletal:  Negative for arthralgias, back pain, gait problem, joint swelling, myalgias and neck pain.   Skin:  Negative for rash and wound.   Allergic/Immunologic: Negative for food allergies and immunocompromised state.   Neurological:  Negative for dizziness, seizures, facial asymmetry, speech difficulty, weakness, light-headedness, numbness and headaches.    Hematological:  Negative for adenopathy. Does not bruise/bleed easily.   Psychiatric/Behavioral:  Negative for behavioral problems, sleep disturbance and suicidal ideas. The patient is not nervous/anxious.             PHYSICAL EXAM:   /66   Pulse 91   Ht 4' 8\" (1.422 m)   Wt 102 lb (46.3 kg)   SpO2 99%   BMI 22.87 kg/m²  Estimated body mass index is 22.87 kg/m² as calculated from the following:    Height as of this encounter: 4' 8\" (1.422 m).    Weight as of this encounter: 102 lb (46.3 kg).     Wt Readings from Last 3 Encounters:   01/15/25 102 lb (46.3 kg)   10/18/24 101 lb (45.8 kg)   08/28/24 102 lb (46.3 kg)       Physical Exam  Vitals reviewed.   Constitutional:       General: She is not in acute distress.     Appearance: She is well-developed.   HENT:      Head: Normocephalic and atraumatic.   Eyes:      Conjunctiva/sclera: Conjunctivae normal.      Pupils: Pupils are equal, round, and reactive to light.   Neck:      Thyroid: No thyromegaly.   Cardiovascular:      Rate and Rhythm: Normal rate and regular rhythm.      Heart sounds: Normal heart sounds, S1 normal and S2 normal. No murmur heard.     No friction rub. No gallop.   Pulmonary:      Effort: Pulmonary effort is normal. No respiratory distress.      Breath sounds: Normal breath sounds. No wheezing or rales.   Chest:      Chest wall: No tenderness.   Abdominal:      General: Bowel sounds are normal. There is no distension.      Palpations: Abdomen is soft. There is no mass.      Tenderness: There is no abdominal tenderness. There is no guarding or rebound.   Musculoskeletal:         General: No tenderness. Normal range of motion.      Cervical back: Normal range of motion.   Lymphadenopathy:      Cervical: No cervical adenopathy.   Skin:     General: Skin is warm.      Findings: No erythema or rash.   Neurological:      Mental Status: She is alert and oriented to person, place, and time.      Cranial Nerves: No cranial nerve deficit.       Deep Tendon Reflexes: Reflexes are normal and symmetric.   Psychiatric:         Behavior: Behavior normal.         Thought Content: Thought content normal.         Judgment: Judgment normal.               ASSESSMENT AND PLAN:   Patient is a 85 year old female who presents primarily presents for:      ICD-10-CM    1. Injury of right knee, initial encounter  S89.91XA         Knee pain is improving.  Discussed supportive treatment with ice, elevated and continued movement.   Xray with no evidence of fracture.          Health Maintenance:    Health Maintenance   Topic Date Due    COVID-19 Vaccine (6 - 2024-25 season) 09/01/2024    Annual Well Visit  01/01/2025    Diabetes Care: Foot Exam (Annual)  01/01/2025    Diabetes Care: Microalb/Creat Ratio (Annual)  01/01/2025    Diabetes Care A1C  02/24/2025    Influenza Vaccine (1) 06/30/2025 (Originally 10/1/2024)    Colorectal Cancer Screening  07/29/2025 (Originally 1/7/1940)    Zoster Vaccines (1 of 2) 01/04/2026 (Originally 1/7/1991)    Diabetes Care Dilated Eye Exam  05/30/2025    Diabetes Care: GFR  08/24/2025    DEXA Scan  Completed    Annual Depression Screening  Completed    Fall Risk Screening (Annual)  Completed    Pneumococcal Vaccine: 50+ Years  Completed    Meningococcal B Vaccine  Aged Out         Meds & Refills for this Visit:  Requested Prescriptions      No prescriptions requested or ordered in this encounter       No orders of the defined types were placed in this encounter.      Imaging & Consults:  None          No follow-ups on file.  Important follow up notes/labs for next visit      Patient indicates understanding of the above recommendations and agrees to the above plan.  Reasurrance and education provided. All questions answered.    Notified to call with any questions, complications, allergies, or worsening or changing symptoms as well as any side effects or complications from the treatments .  Red flags/ ER precautions discussed.    If diagnostic  labs or imaging ordered advised patient to contact my office for results  24-48 hours after completion    This note was dictated using dragon speech recognition transcription software.  Typographical and transcription errors may be present.  Please call if any questions.       As part of our commitment to providing you with comprehensive, transparent, and timely access to your health information, we adhere to the guidelines set forth by the 21st Century Cures Act. This Act enhances your rights to access your electronic health information and ensures that you can easily obtain your medical records.  Please note that the verbage used in this note is intended for medical documentation and communication and may be interpreted as forthright.  Please do not hesitate to contact my office if you have any questions.            Tommy Adams MD  [facility]

## 2025-01-28 ENCOUNTER — TELEPHONE (OUTPATIENT)
Dept: INTERNAL MEDICINE CLINIC | Facility: CLINIC | Age: 85
End: 2025-01-28

## 2025-01-28 DIAGNOSIS — M25.562 PAIN IN BOTH KNEES, UNSPECIFIED CHRONICITY: Primary | ICD-10-CM

## 2025-01-28 DIAGNOSIS — M79.606 PAIN OF LOWER EXTREMITY, UNSPECIFIED LATERALITY: ICD-10-CM

## 2025-01-28 DIAGNOSIS — M25.561 PAIN IN BOTH KNEES, UNSPECIFIED CHRONICITY: Primary | ICD-10-CM

## 2025-01-28 NOTE — TELEPHONE ENCOUNTER
PT Referral entered for [facility]. Message routed to manage care to expedite referral

## 2025-01-28 NOTE — TELEPHONE ENCOUNTER
Pt is calling stating that last visit saw pcp in regards to injury, pt states that she has therapy for knee but would like to also get physical therapy for leg. Pt states therapist are not able to do it until referral is put in.    [facility]     Please call and advise

## 2025-02-07 ENCOUNTER — PATIENT OUTREACH (OUTPATIENT)
Dept: CASE MANAGEMENT | Age: 85
End: 2025-02-07

## 2025-02-07 DIAGNOSIS — M25.512 CHRONIC PAIN OF BOTH SHOULDERS: ICD-10-CM

## 2025-02-07 DIAGNOSIS — E78.00 HYPERCHOLESTEREMIA: ICD-10-CM

## 2025-02-07 DIAGNOSIS — Z90.11 STATUS POST RIGHT MASTECTOMY: ICD-10-CM

## 2025-02-07 DIAGNOSIS — Z85.3 PERSONAL HISTORY OF BREAST CANCER: ICD-10-CM

## 2025-02-07 DIAGNOSIS — Z85.828 HISTORY OF BASAL CELL CARCINOMA: ICD-10-CM

## 2025-02-07 DIAGNOSIS — H40.003 GLAUCOMA SUSPECT OF BOTH EYES: ICD-10-CM

## 2025-02-07 DIAGNOSIS — I10 ESSENTIAL HYPERTENSION: ICD-10-CM

## 2025-02-07 DIAGNOSIS — G89.29 CHRONIC PAIN OF BOTH SHOULDERS: ICD-10-CM

## 2025-02-07 DIAGNOSIS — M25.511 CHRONIC PAIN OF BOTH SHOULDERS: ICD-10-CM

## 2025-02-07 DIAGNOSIS — E11.3293 CONTROLLED TYPE 2 DIABETES MELLITUS WITH BOTH EYES AFFECTED BY MILD NONPROLIFERATIVE RETINOPATHY WITHOUT MACULAR EDEMA, WITHOUT LONG-TERM CURRENT USE OF INSULIN (HCC): Primary | ICD-10-CM

## 2025-02-07 NOTE — PROGRESS NOTES
Attempt to reach Gilma Ghotra to do CCM Monthly call for February. Left message for patient to call CCM to do monthly.    Total time -  4 min.  Total Monthly time-  4 min.   Next Care Manager Follow Up Date: 2-4 Weeks.

## 2025-02-10 NOTE — PROGRESS NOTES
2/10/2025  Spoke to Gilma for [facility].      Updates to patient care team/comments:  UTD/Reviewed with patient.  Patient reported changes in medications: None   Med Adherence  Comment: taking as directed.     Health Maintenance: UTD/Reviewed with patient  Health Maintenance   Topic Date Due    COVID-19 Vaccine (6 - 2024-25 season) 09/01/2024 refuses to get    Annual Well Visit  01/01/2025 scheduled for 03/06    Diabetes Care: Foot Exam (Annual)  01/01/2025    Diabetes Care: Microalb/Creat Ratio (Annual)  01/01/2025    Diabetes Care A1C  02/24/2025    Influenza Vaccine (1) 06/30/2025 (Originally 10/1/2024)    Colorectal Cancer Screening  07/29/2025 (Originally 1/7/1940)    Zoster Vaccines (1 of 2) 01/04/2026 (Originally 1/7/1991)    Diabetes Care Dilated Eye Exam  05/30/2025    Diabetes Care: GFR  08/24/2025    DEXA Scan  Completed    Annual Depression Screening  Completed    Fall Risk Screening (Annual)  Completed    Pneumococcal Vaccine: 50+ Years  Completed    Meningococcal B Vaccine  Aged Out       Patient updates/concerns:     The patient concluded physical therapy and is doing better but still has some discomfort, her last therapy was on 02/06/2025.    The patient is taking medications as prescribed and has no concerns or side effects to them, no need for refills at the moment.    The patient was ordered to continue with home therapy exercises and is trying to do them as ordered, she reports no complications performing them.    The patient is scheduled to see her PCP next month and will be getting her wellness exam during that visit, she was made aware of his new office location. She wants [facility] to make a request to her PCP to have him place lab orders if needed.    The patient has not gotten any vaccines recently, she is due for covid and influenza but is refusing to get them.  Goals/Action Plan:    Active goal from previous outreach: wants to feel better on her knee    Patient reported progress towards goals:  ongoing and working towards goal               - What: wants to be able to feel better on her knee           - Where/When/How:   Patient Reported Barriers and Concerns: None                   - Plan for overcoming barriers: N/A    Care Managers Interventions:     CCM to send TE to PCP for lab orders.    CCM reminded the patient of new office location for PCP, she was aware of it already.    Spoke at length, what foods to eat in moderation lifestyle changes, such as increasing exercises    Provided Gilma education on importance of adherence for optimal benefit.     Listened to patient's concerns, provided support, and encouraged Gilma to meet goals. Informed of the importance on reporting any new symptoms to prevent any health complications.    Discussed dietary modification such as portion control, meal choices, and timing of meals. Provided diabetic friendly food choices to consider when grocery shopping, recommended more whole grains, proteins, and vegetables.     Advised patient to focus on eating healthier meals, keep doctor's appointment, and perform exercise as tolerated.    Continued to provide education on how to better manage and cope with chronic conditions. Informed of the importance on reporting any new symptoms to prevent any health complications.       Future Appointments:   Future Appointments   Date Time Provider Department Center   3/6/2025 10:20 AM Tommy Adams MD [id number]     Next Care Manager Follow Up Date: in a month or sooner if needed.    Reason For Follow Up: review progress and or barriers towards patient's goals.     Time Spent This Encounter Total:  21 min medical record review, telephone communication, care plan updates where needed, education, goals, and action plan recreation/update. Provided acknowledgment and validation to patient's concerns.   Monthly Minute Total including today: 25  Physical assessment, complete health history, and need for CCM established by Tommy CAPELLAN  MD [place].

## 2025-02-14 ENCOUNTER — TELEPHONE (OUTPATIENT)
Dept: INTERNAL MEDICINE CLINIC | Facility: CLINIC | Age: 85
End: 2025-02-14

## 2025-02-14 DIAGNOSIS — E78.00 HYPERCHOLESTEREMIA: ICD-10-CM

## 2025-02-14 DIAGNOSIS — E11.3293 CONTROLLED TYPE 2 DIABETES MELLITUS WITH BOTH EYES AFFECTED BY MILD NONPROLIFERATIVE RETINOPATHY WITHOUT MACULAR EDEMA, WITHOUT LONG-TERM CURRENT USE OF INSULIN (HCC): Primary | ICD-10-CM

## 2025-02-14 DIAGNOSIS — I10 ESSENTIAL HYPERTENSION: ICD-10-CM

## 2025-02-17 NOTE — TELEPHONE ENCOUNTER
Orders approved, the patient was notified of the approval and was made aware she needs to be fasting for them.

## 2025-03-03 ENCOUNTER — LAB ENCOUNTER (OUTPATIENT)
Dept: LAB | Facility: HOSPITAL | Age: 85
End: 2025-03-03
Attending: INTERNAL MEDICINE
Payer: MEDICARE

## 2025-03-03 DIAGNOSIS — E78.00 HYPERCHOLESTEREMIA: ICD-10-CM

## 2025-03-03 DIAGNOSIS — E11.3293 CONTROLLED TYPE 2 DIABETES MELLITUS WITH BOTH EYES AFFECTED BY MILD NONPROLIFERATIVE RETINOPATHY WITHOUT MACULAR EDEMA, WITHOUT LONG-TERM CURRENT USE OF INSULIN (HCC): ICD-10-CM

## 2025-03-03 DIAGNOSIS — I10 ESSENTIAL HYPERTENSION: ICD-10-CM

## 2025-03-03 LAB
ALBUMIN SERPL-MCNC: 4.7 G/DL (ref 3.2–4.8)
ALBUMIN/GLOB SERPL: 1.7 {RATIO} (ref 1–2)
ALP LIVER SERPL-CCNC: 112 U/L
ALT SERPL-CCNC: 18 U/L
ANION GAP SERPL CALC-SCNC: 5 MMOL/L (ref 0–18)
AST SERPL-CCNC: 23 U/L (ref ?–34)
BILIRUB SERPL-MCNC: 0.8 MG/DL (ref 0.2–1.1)
BUN BLD-MCNC: 12 MG/DL (ref 9–23)
BUN/CREAT SERPL: 19.7 (ref 10–20)
CALCIUM BLD-MCNC: 9.6 MG/DL (ref 8.7–10.4)
CHLORIDE SERPL-SCNC: 94 MMOL/L (ref 98–112)
CHOLEST SERPL-MCNC: 137 MG/DL (ref ?–200)
CO2 SERPL-SCNC: 29 MMOL/L (ref 21–32)
CREAT BLD-MCNC: 0.61 MG/DL
CREAT UR-SCNC: 21.9 MG/DL
EGFRCR SERPLBLD CKD-EPI 2021: 88 ML/MIN/1.73M2 (ref 60–?)
EST. AVERAGE GLUCOSE BLD GHB EST-MCNC: 148 MG/DL (ref 68–126)
FASTING PATIENT LIPID ANSWER: YES
FASTING STATUS PATIENT QL REPORTED: YES
GLOBULIN PLAS-MCNC: 2.7 G/DL (ref 2–3.5)
GLUCOSE BLD-MCNC: 111 MG/DL (ref 70–99)
HBA1C MFR BLD: 6.8 % (ref ?–5.7)
HDLC SERPL-MCNC: 44 MG/DL (ref 40–59)
LDLC SERPL CALC-MCNC: 74 MG/DL (ref ?–100)
MICROALBUMIN UR-MCNC: <0.3 MG/DL
NONHDLC SERPL-MCNC: 93 MG/DL (ref ?–130)
OSMOLALITY SERPL CALC.SUM OF ELEC: 266 MOSM/KG (ref 275–295)
POTASSIUM SERPL-SCNC: 4.3 MMOL/L (ref 3.5–5.1)
PROT SERPL-MCNC: 7.4 G/DL (ref 5.7–8.2)
SODIUM SERPL-SCNC: 128 MMOL/L (ref 136–145)
TRIGL SERPL-MCNC: 104 MG/DL (ref 30–149)
VLDLC SERPL CALC-MCNC: 16 MG/DL (ref 0–30)

## 2025-03-03 PROCEDURE — 80053 COMPREHEN METABOLIC PANEL: CPT

## 2025-03-03 PROCEDURE — 80061 LIPID PANEL: CPT

## 2025-03-03 PROCEDURE — 83036 HEMOGLOBIN GLYCOSYLATED A1C: CPT

## 2025-03-03 PROCEDURE — 82043 UR ALBUMIN QUANTITATIVE: CPT

## 2025-03-03 PROCEDURE — 82570 ASSAY OF URINE CREATININE: CPT

## 2025-03-03 PROCEDURE — 36415 COLL VENOUS BLD VENIPUNCTURE: CPT

## 2025-03-06 ENCOUNTER — OFFICE VISIT (OUTPATIENT)
Age: 85
End: 2025-03-06
Payer: COMMERCIAL

## 2025-03-06 VITALS
SYSTOLIC BLOOD PRESSURE: 130 MMHG | HEART RATE: 88 BPM | HEIGHT: 56 IN | TEMPERATURE: 97 F | BODY MASS INDEX: 22.99 KG/M2 | OXYGEN SATURATION: 9 % | WEIGHT: 102.19 LBS | DIASTOLIC BLOOD PRESSURE: 60 MMHG

## 2025-03-06 DIAGNOSIS — Z85.3 PERSONAL HISTORY OF BREAST CANCER: ICD-10-CM

## 2025-03-06 DIAGNOSIS — I10 ESSENTIAL HYPERTENSION: ICD-10-CM

## 2025-03-06 DIAGNOSIS — E87.1 HYPONATREMIA: ICD-10-CM

## 2025-03-06 DIAGNOSIS — H40.003 GLAUCOMA SUSPECT OF BOTH EYES: ICD-10-CM

## 2025-03-06 DIAGNOSIS — K57.30 DIVERTICULOSIS OF COLON: ICD-10-CM

## 2025-03-06 DIAGNOSIS — M25.511 CHRONIC PAIN OF BOTH SHOULDERS: ICD-10-CM

## 2025-03-06 DIAGNOSIS — M85.89 OSTEOPENIA OF MULTIPLE SITES: ICD-10-CM

## 2025-03-06 DIAGNOSIS — Z00.00 ENCOUNTER FOR ANNUAL HEALTH EXAMINATION: ICD-10-CM

## 2025-03-06 DIAGNOSIS — G89.29 CHRONIC PAIN OF RIGHT KNEE: ICD-10-CM

## 2025-03-06 DIAGNOSIS — M25.561 CHRONIC PAIN OF RIGHT KNEE: ICD-10-CM

## 2025-03-06 DIAGNOSIS — Z85.828 HISTORY OF BASAL CELL CARCINOMA: ICD-10-CM

## 2025-03-06 DIAGNOSIS — G89.29 CHRONIC PAIN OF BOTH SHOULDERS: ICD-10-CM

## 2025-03-06 DIAGNOSIS — E11.9 CONTROLLED TYPE 2 DIABETES MELLITUS WITHOUT COMPLICATION, WITHOUT LONG-TERM CURRENT USE OF INSULIN (HCC): ICD-10-CM

## 2025-03-06 DIAGNOSIS — E78.00 HYPERCHOLESTEREMIA: ICD-10-CM

## 2025-03-06 DIAGNOSIS — G89.29 CHRONIC PAIN OF RIGHT ANKLE: ICD-10-CM

## 2025-03-06 DIAGNOSIS — M25.512 CHRONIC PAIN OF BOTH SHOULDERS: ICD-10-CM

## 2025-03-06 DIAGNOSIS — Z90.11 STATUS POST RIGHT MASTECTOMY: ICD-10-CM

## 2025-03-06 DIAGNOSIS — M25.571 CHRONIC PAIN OF RIGHT ANKLE: ICD-10-CM

## 2025-03-06 DIAGNOSIS — Z00.00 MEDICARE ANNUAL WELLNESS VISIT, SUBSEQUENT: Primary | ICD-10-CM

## 2025-03-06 PROBLEM — Z96.1 PSEUDOPHAKIA OF BOTH EYES: Status: RESOLVED | Noted: 2019-01-25 | Resolved: 2025-03-06

## 2025-03-06 PROBLEM — H35.372 EPIRETINAL MEMBRANE (ERM) OF LEFT EYE: Status: RESOLVED | Noted: 2019-01-25 | Resolved: 2025-03-06

## 2025-03-07 ENCOUNTER — PATIENT OUTREACH (OUTPATIENT)
Dept: CASE MANAGEMENT | Age: 85
End: 2025-03-07

## 2025-03-07 DIAGNOSIS — E78.00 HYPERCHOLESTEREMIA: ICD-10-CM

## 2025-03-07 DIAGNOSIS — H40.003 GLAUCOMA SUSPECT OF BOTH EYES: ICD-10-CM

## 2025-03-07 DIAGNOSIS — M85.89 OSTEOPENIA OF MULTIPLE SITES: ICD-10-CM

## 2025-03-07 DIAGNOSIS — E11.3293 CONTROLLED TYPE 2 DIABETES MELLITUS WITH BOTH EYES AFFECTED BY MILD NONPROLIFERATIVE RETINOPATHY WITHOUT MACULAR EDEMA, WITHOUT LONG-TERM CURRENT USE OF INSULIN (HCC): Primary | ICD-10-CM

## 2025-03-07 DIAGNOSIS — I10 ESSENTIAL HYPERTENSION: ICD-10-CM

## 2025-03-07 DIAGNOSIS — Z85.3 PERSONAL HISTORY OF BREAST CANCER: ICD-10-CM

## 2025-03-07 NOTE — PROGRESS NOTES
3/7/2025  Spoke to Gilma for CCM.      Updates to patient care team/comments:  UTD/Reviewed with patient  Patient reported changes in medications: No longer using Ketoralac eye drops   Med Adherence  Comment: taking as prescribed     Health Maintenance: UTD/Reviewed with patient  Health Maintenance   Topic Date Due    COVID-19 Vaccine (6 - 2024-25 season) 09/01/2024 patient is refusing    Annual Well Visit  01/01/2025    Diabetes Care: Foot Exam (Annual)  01/01/2025    Diabetes Care Dilated Eye Exam  05/30/2025    Influenza Vaccine (1) 06/30/2025 (Originally 10/1/2024)    Colorectal Cancer Screening  07/29/2025 (Originally 1/7/1940)    Zoster Vaccines (1 of 2) 01/04/2026 (Originally 1/7/1991)    Diabetes Care A1C  09/03/2025    Diabetes Care: GFR  03/03/2026    DEXA Scan  Completed    Annual Depression Screening  Completed    Fall Risk Screening (Annual)  Completed    Diabetes Care: Microalb/Creat Ratio (Annual)  Completed    Pneumococcal Vaccine: 50+ Years  Completed    Meningococcal B Vaccine  Aged Out       Patient updates/concerns:     The patient reached out to [facility] about a visit she just had with PCP yesterday, she forgot to mention to him that her physical therapist is asking for her to get an MRI done, per Gilma she continues with knee pain and would like to continue with therapy but the therapist prefers to have imaging done before they continue with more sessions. The patient already attended thirty sessions between her knee and her ankle.    The patient is asking CCM to reach out to PCP for possible order.    The patient was given lab results during her visit yesterday and was told her sodium was low, she is worried that the low levels might be the reason why she fell last time, injuring her ankle. [facility] advised that it was better to just follow MD's recommendations about increasing salt intake on her diet, she will also buy electrolytes drinks and was asked to monitor her blood pressure just to make sure it  doesn't go up.    The patient is scheduled to see the retina specialist this month and in May will bee seen her regular ophthalmologist.    Goals/Action Plan:    Active goal from previous outreach:  be able to walk with no pain     Patient reported progress towards goals: ongoing and working towards her goal               - What: be able to walk with no pain            - Where/When/How: continue with physical therapy and get MRI done   Patient Reported Barriers and Concerns: None                   - Plan for overcoming barriers: N/A    Care Managers Interventions:     [facility] sent a message to PCP asking for a possible MRI order.    PCP approved such request and CCM communicated it to the patient.    [facility] called outpatient scheduling department and per Debbie an appointment has been scheduled for her for 04/04 at 215pm, she needs to be there 30 mins prior the appt, she needs to register at the main diagnostic desk @ [facility].  10 mins with Debbie    CCM made sure patient is aware she needs to have BMP done again to follow up on her sodium levels, she agreed to get it done in three months as instructed by MD.  Future Appointments:   Future Appointments   Date Time Provider Department Center   9/8/2025 11:40 AM Tommy Adams MD [id number]     Next Care Manager Follow Up Date: in a month or sooner if needed    Reason For Follow Up: review progress and or barriers towards patient's goals.     Time Spent This Encounter Total: 29  min medical record review, telephone communication, care plan updates where needed, education, goals, and action plan recreation/update. Provided acknowledgment and validation to patient's concerns.   Monthly Minute Total including today: 29  Physical assessment, complete health history, and need for CCM established by Tommy Adams MD.

## 2025-03-13 PROBLEM — G89.29 CHRONIC PAIN OF RIGHT ANKLE: Status: ACTIVE | Noted: 2025-03-13

## 2025-03-13 PROBLEM — M25.571 CHRONIC PAIN OF RIGHT ANKLE: Status: ACTIVE | Noted: 2025-03-13

## 2025-03-13 PROBLEM — M25.561 CHRONIC PAIN OF RIGHT KNEE: Status: ACTIVE | Noted: 2025-03-13

## 2025-03-13 PROBLEM — G89.29 CHRONIC PAIN OF RIGHT KNEE: Status: ACTIVE | Noted: 2025-03-13

## 2025-03-13 NOTE — PROGRESS NOTES
Subjective:   Gilma Ghotra is a 85 year old female who presents for a MA AHA (Medicare Advantage Annual Health Assessment) and scheduled follow up of multiple significant but stable problems.       History/Other:   Fall Risk Assessment:   She has been screened for Falls and is High Risk. Fall Prevention information provided to patient in After Visit Summary.    Do you feel unsteady when standing or walking?: Yes  Do you worry about falling?: Yes  Have you fallen in the past year?: Yes  How many times have you fallen?: 1  Were you injured?: No     Cognitive Assessment:   She had a completely normal cognitive assessment - see flowsheet entries     Functional Ability/Status:   Gilma Ghotra has some abnormal functions as listed below:  She has Driving difficulties based on screening of functional status. She has difficulties Shopping for Groceries based on screening of functional status. She has Vision problems based on screening of functional status. She has Walking problems based on screening of functional status.       Depression Screening (PHQ):  PHQ-2 SCORE: 0  , done 3/6/2025             Advanced Directives:   She does NOT have a Living Will. [Do you have a living will?: No]  She does NOT have a Power of  for Health Care. 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    Current Medications:  Outpatient Medications Marked as Taking for the 3/6/25 encounter (Office Visit) with Tommy Adams MD   Medication Sig    [DISCONTINUED] ketorolac 0.5 % Ophthalmic Solution  (Patient not taking: Reported on 3/7/2025)    Multiple Vitamin (MULTI-VITAMIN) Oral Tab Take 1 tablet by mouth daily.    ATORVASTATIN 10 MG Oral Tab TOME MEDIA TABLETA POR LA BOCA  CADA NOCHE    enalapril 2.5 MG Oral Tab Take 1 tablet (2.5 mg total) by mouth daily.       Medical History:  She  has a past medical history of Basal cell carcinoma (BCC) of right lower extremity (11/4/2019), BCC (basal cell carcinoma of skin) (2019), Breast cancer (HCC) (2001), Diabetes (HCC), Hearing impairment, High blood pressure, High cholesterol, Hyperlipidemia, and Visual impairment.  Surgical History:  She  has a past surgical history that includes biopsy of skin lesion; cataract; skin tissue rearrangement (Right, 11/22/2019); mastectomy right (2001); and baylee biopsy stereo nodule 1 site left (cpt=19081) (Left, 07/05/2023).   Family History:  Her family history includes Alcohol and Other Disorders Associated in her son; Breast Cancer (age of onset: 61) in her self; Heart Attack in her father; No Known Problems in her daughter; Stroke in her mother.  Social History:  She  reports that she has never smoked. She has never been exposed to tobacco smoke. She has never used smokeless tobacco. She reports current alcohol use. She reports that she does not use drugs.    Tobacco:  She has never smoked tobacco.    CAGE Alcohol Screen:   CAGE screening score of 0 on 3/6/2025, showing low risk of alcohol abuse.      Patient Care Team:  Tommy Adams MD as PCP - General (Internal Medicine)  Lauryn Webb PT as Physical Therapist (Physical Therapy)  Penny Hicks RMA as CCM Care Manager  Charli Crawley MD as Referring Physician (OPHTHALMOLOGY)  Antoine Rogers MD as Referring Physician (OPHTHALMOLOGY)  Ashley Caldwell,  MD (DERMATOLOGY)    Review of Systems  GENERAL: feels well otherwise  SKIN: denies any unusual skin lesions  EYES: denies blurred vision or double vision  HEENT: denies nasal congestion, sinus pain or ST  LUNGS: denies shortness of breath with exertion  CARDIOVASCULAR: denies chest pain on exertion  GI: denies abdominal pain, denies heartburn  : denies dysuria, vaginal discharge or itching, no complaint of urinary incontinence   MUSCULOSKELETAL: denies back pain.  Right knee and ankle pain.    NEURO: denies headaches  PSYCHE: denies depression or anxiety  HEMATOLOGIC: denies hx of anemia  ENDOCRINE: denies thyroid history  ALL/ASTHMA: denies hx of allergy or asthma    Objective:   Physical Exam  General Appearance:  Alert, cooperative, no distress, appears stated age   Head:  Normocephalic, without obvious abnormality, atraumatic   Eyes:  PERRL, conjunctiva/corneas clear, EOM's intact both eyes   Ears:  Normal TM's and external ear canals, both ears   Nose: Nares normal, septum midline,mucosa normal, no drainage or sinus tenderness   Throat: Lips, mucosa, and tongue normal; teeth and gums normal   Neck: Supple, symmetrical, trachea midline, no adenopathy;  thyroid: not enlarged, symmetric, no tenderness/mass/nodules; no carotid bruit or JVD   Back:   Symmetric, no curvature, ROM normal, no CVA tenderness   Lungs:   Clear to auscultation bilaterally, respirations unlabored   Heart:  Regular rate and rhythm, S1 and S2 normal, no murmur, rub, or gallop   Abdomen:   Soft, non-tender, bowel sounds active all four quadrants,  no masses, no organomegaly   Pelvic: Deferred   Extremities: Extremities normal, atraumatic, no cyanosis or edema   Pulses: 2+ and symmetric   Skin: Skin color, texture, turgor normal, no rashes or lesions   Lymph nodes: Cervical, supraclavicular, and axillary nodes normal   Neurologic: Normal       /60   Pulse 88   Temp 97.4 °F (36.3 °C)   Ht 4' 8\" (1.422 m)   Wt 102 lb 3.2 oz (46.4 kg)    SpO2 (!) 9%   BMI 22.91 kg/m²  Estimated body mass index is 22.91 kg/m² as calculated from the following:    Height as of this encounter: 4' 8\" (1.422 m).    Weight as of this encounter: 102 lb 3.2 oz (46.4 kg).    Medicare Hearing Assessment:   Hearing Screening    Screening Method: Whisper Test  Whisper Test Result: Pass         Visual Acuity:   Right Eye Visual Acuity: Corrected Right Eye Chart Acuity: 20/40   Left Eye Visual Acuity: Corrected Left Eye Chart Acuity: 20/20   Both Eyes Visual Acuity: Corrected Both Eyes Chart Acuity: 20/20   Able To Tolerate Visual Acuity: Yes        Assessment & Plan:   Gilma Ghotra is a 85 year old female who presents for a Medicare Assessment.     1. Medicare annual wellness visit, subsequent (Primary)  -  During the annual physical exam I spent extensive time discussing medical history including existing conditions, past surgeries, allergies and medications.  Lifestyle factors discussed including diet, exercise and substance abuse including alcohol and tobacco if warranted.  Counseled on screening tests based on age and underlying risk factors which may include but not limited to blood pressure measurement, cholesterol screening, diabetes screening and prostate cancer screening.  Preventative screenings discussed.  Reviewed immunizations.  Sexual health discussed if appropriate.  Family history reviewed.  I addressed any specific concerns or symptoms that the patient had.      2. Controlled type 2 diabetes mellitus without complication.        -  Diet controlled.  A1C well controlled.      3. Essential hypertension      -   Well controlled.  On enalapril 2.5 mg.      4. Hypercholesteremia       -  On atorvastatin 10 daily.      5. Diverticulosis of colon       -  Stable.      6. Chronic pain of both shoulders      -  discussed supportive treatment      7. Osteopenia of multiple sites      -    Discussed exercise.  Take OsCal 500/200 twice daily.  Increase multicomponent  exercise with strength and balance training    A balanced diet consisting of Vitamin D, Calcium, Protein, Vegetable and fruits is recommended.      8. History of basal cell carcinoma     - stable.     9. Personal history of breast cancer      - stable.     10. Status post right mastectomy      -  stable.     11. Glaucoma suspect of both eyes  Overview:  with normal visual fields    12. Hyponatremia  -     Basic Metabolic Panel (8); Future; Expected date: 03/06/2025    13. Chronic pain of right ankle  -     Physical Therapy Referral - [facility]  -     Physical Therapy Referral - External    14. Chronic pain of right knee  -     MRI KNEE, RIGHT (ATX=[id number]); Future; Expected date: 03/07/2025. She has completed many sessions of PT   -     Physical Therapy Referral - External    15. Encounter for annual health examination    The patient indicates understanding of these issues and agrees to the plan.  Reinforced healthy diet, lifestyle, and exercise.      Return in about 3 months (around 6/6/2025).     Tommy Adams MD, 3/13/2025     Supplementary Documentation:   General Health:  In the past six months, have you lost more than 10 pounds without trying?: 2 - No  Has your appetite been poor?: No  Type of Diet: Balanced  How does the patient maintain a good energy level?: Daily Walks, Stretching  How would you describe your daily physical activity?: Moderate  How would you describe your current health state?: Good  How do you maintain positive mental well-being?: Visiting Friends, Puzzles, Games, Visiting Family  On a scale of 0 to 10, with 0 being no pain and 10 being severe pain, what is your pain level?: 6 - (Moderate) (knee)  In the past six months, have you experienced urine leakage?: 0-No  At any time do you feel concerned for the safety/well-being of yourself and/or your children, in your home or elsewhere?: No  Have you had any immunizations at another office such as Influenza, Hepatitis B, Tetanus, or  Pneumococcal?: No    Health Maintenance   Topic Date Due    Annual Well Visit  01/01/2025    Diabetes Care: Foot Exam (Annual)  01/01/2025    Diabetes Care Dilated Eye Exam  05/30/2025    Influenza Vaccine (1) 06/30/2025 (Originally 10/1/2024)    Colorectal Cancer Screening  07/29/2025 (Originally 1/7/1940)    Zoster Vaccines (1 of 2) 01/04/2026 (Originally 1/[birth year]/1991)    COVID-19 Vaccine (6 - 2024-25 season) 04/07/2026 (Originally 9/1/2024)    Diabetes Care A1C  09/03/2025    Diabetes Care: GFR  03/03/2026    DEXA Scan  Completed    Annual Depression Screening  Completed    Fall Risk Screening (Annual)  Completed    Diabetes Care: Microalb/Creat Ratio (Annual)  Completed    Pneumococcal Vaccine: 50+ Years  Completed    Meningococcal B Vaccine  Aged Out

## 2025-03-15 PROBLEM — E11.9 CONTROLLED TYPE 2 DIABETES MELLITUS, WITHOUT LONG-TERM CURRENT USE OF INSULIN (HCC): Status: ACTIVE | Noted: 2019-01-09

## 2025-03-16 DIAGNOSIS — E78.00 HYPERCHOLESTEREMIA: ICD-10-CM

## 2025-03-16 DIAGNOSIS — I10 ESSENTIAL HYPERTENSION: ICD-10-CM

## 2025-03-17 RX ORDER — ENALAPRIL MALEATE 2.5 MG/1
2.5 TABLET ORAL DAILY
Qty: 100 TABLET | Refills: 2 | Status: SHIPPED | OUTPATIENT
Start: 2025-03-17

## 2025-03-17 RX ORDER — ATORVASTATIN CALCIUM 10 MG/1
TABLET, FILM COATED ORAL
Qty: 50 TABLET | Refills: 2 | Status: SHIPPED | OUTPATIENT
Start: 2025-03-17

## 2025-04-01 ENCOUNTER — HOSPITAL ENCOUNTER (OUTPATIENT)
Dept: MRI IMAGING | Facility: HOSPITAL | Age: 85
Discharge: HOME | End: 2025-04-01
Attending: INTERNAL MEDICINE
Payer: MEDICARE

## 2025-04-01 DIAGNOSIS — G89.29 CHRONIC PAIN OF RIGHT KNEE: ICD-10-CM

## 2025-04-01 DIAGNOSIS — M25.561 CHRONIC PAIN OF RIGHT KNEE: ICD-10-CM

## 2025-04-01 PROCEDURE — 73721 MRI JNT OF LWR EXTRE W/O DYE: CPT | Performed by: INTERNAL MEDICINE

## 2025-04-07 ENCOUNTER — PATIENT OUTREACH (OUTPATIENT)
Dept: CASE MANAGEMENT | Age: 85
End: 2025-04-07

## 2025-04-07 ENCOUNTER — TELEPHONE (OUTPATIENT)
Age: 85
End: 2025-04-07

## 2025-04-07 DIAGNOSIS — S83.271D COMPLEX TEAR OF LATERAL MENISCUS OF RIGHT KNEE AS CURRENT INJURY, SUBSEQUENT ENCOUNTER: ICD-10-CM

## 2025-04-07 DIAGNOSIS — G89.29 CHRONIC PAIN OF RIGHT KNEE: Primary | ICD-10-CM

## 2025-04-07 DIAGNOSIS — M17.11 PRIMARY OSTEOARTHRITIS OF RIGHT KNEE: Primary | ICD-10-CM

## 2025-04-07 DIAGNOSIS — M25.561 CHRONIC PAIN OF RIGHT KNEE: Primary | ICD-10-CM

## 2025-04-07 DIAGNOSIS — M85.89 OSTEOPENIA OF MULTIPLE SITES: ICD-10-CM

## 2025-04-07 DIAGNOSIS — E11.9 CONTROLLED TYPE 2 DIABETES MELLITUS WITHOUT COMPLICATION, WITHOUT LONG-TERM CURRENT USE OF INSULIN (HCC): ICD-10-CM

## 2025-04-07 DIAGNOSIS — I10 ESSENTIAL HYPERTENSION: ICD-10-CM

## 2025-04-07 NOTE — TELEPHONE ENCOUNTER
Spoke with patient and . Made them aware of the results. They requesting that MRI results be faxed to [facility] at [phone]. Results printed and faxed.

## 2025-04-07 NOTE — TELEPHONE ENCOUNTER
I attempted to contact patient to inform of her MRI results.  No answer.  Left voicemail to call our office back.    Patient has severe right knee tricompartmental arthritis with extensive complex tearing through her lateral meniscus.  She also has multiple ganglion cyst within the posterior knee capsule.  I recommend that she follow-up with orthopedic surgery to discuss treatment options.  I have placed a referral to see Dr. Sandra saunders.       Staff please inform patient.      Dr. Adams

## 2025-04-07 NOTE — PROGRESS NOTES
4/7/2025  Spoke to Gilma for [facility].      Updates to patient care team/comments:  UTD/Reviewed with patient  Patient reported changes in medications: None   Med Adherence  Comment: taking as prescribed     Health Maintenance: UTD/Reviewed with patient    Health Maintenance   Topic Date Due    Diabetes Care: Foot Exam (Annual)  01/01/2025    Diabetes Care Dilated Eye Exam  05/30/2025 scheduled for May    Colorectal Cancer Screening  07/29/2025 (Originally 1/7/1940)    Zoster Vaccines (1 of 2) 01/04/2026 (Originally 1/7/1990)    COVID-19 Vaccine (6 - 2024-25 season) 04/07/2026 (Originally 9/1/2024)    Diabetes Care A1C  09/03/2025    Influenza Vaccine (Season Ended) 10/01/2025    Diabetes Care: GFR  03/03/2026    DEXA Scan  Completed    Annual Well Visit  Completed    Annual Depression Screening  Completed    Fall Risk Screening (Annual)  Completed    Diabetes Care: Microalb/Creat Ratio (Annual)  Completed    Pneumococcal Vaccine: 50+ Years  Completed    Meningococcal B Vaccine  Aged Out       Patient updates/concerns:     The patient is reporting to [facility] to continue with knee pain, she went and had an MRI done on 04/01/2025, the results of the imaging were already discussed by PCP's office with her, she is aware she needs to see an orthopedic surgeon.    Note     I attempted to contact patient to inform of her MRI results.  No answer.  Left voicemail to call our office back.     Patient has severe right knee tricompartmental arthritis with extensive complex tearing through her lateral meniscus.  She also has multiple ganglion cyst within the posterior knee capsule.  I recommend that she follow-up with orthopedic surgery to discuss treatment options.  I have placed a referral to see Dr. Sandra saunders.        Staff please inform patient.       Dr. Adams           The patient was receiving physical therapy on her knee at [facility] @ [place] but such appointments have to be cancel for now until she sees the specialist, she  was referred to Dr Gillespie. Gilma is asking CCM to assist in canceling her PT appointment, she is also requesting to assist in scheduling with the specialist.    The patient is due to have her diabetic eye exam and is already scheduled to see him this month.  Goals/Action Plan:    Active goal from previous outreach: she wants to be able to walk pain free.    Patient reported progress towards goals: ongoing and working towards her goal               - What: she wants to be able to walk pain free.           - Where/When/How: the patient to be seen   Patient Reported Barriers and Concerns: None                   - Plan for overcoming barriers: N/A    Care Managers Interventions:     CCM called [facility] at [place]  and per Christina all future appointments got cancelled for now.  2 mins with Christina.    [facility] called the office of Dr Gillespie, and was able to schedule her for Wednesday 04/09 at 930am at the [facility] location, [address]  10 mins with Tamanna     CCM informed patient of the appointment above and accepted it.    Continued to provide education on how to better manage and cope with chronic conditions. Informed of the importance on reporting any new symptoms to prevent any health complications    Reconciled the patient's chart using care everywhere.    Updated health maintenance: Recommended patient to keep ophthalmologist appointment to have diabetic eye exam done.    Immunization Query completed: No updates available.    Encouraged patient to call as needed.     Future Appointments:   Future Appointments   Date Time Provider Department Center   9/8/2025 11:40 AM Tommy Adams MD [id number]     Next Care Manager Follow Up Date: in a month or sooner if needed.    Reason For Follow Up: review progress and or barriers towards patient's goals.     Time Spent This Encounter Total: 32 min medical record review, telephone communication, care plan updates where needed, education, goals, and action  plan recreation/update. Provided acknowledgment and validation to patient's concerns.   Monthly Minute Total including today: 32  Physical assessment, complete health history, and need for CCM established by Tommy Adams MD.

## 2025-04-09 ENCOUNTER — OFFICE VISIT (OUTPATIENT)
Age: 85
End: 2025-04-09
Payer: COMMERCIAL

## 2025-04-09 ENCOUNTER — HOSPITAL ENCOUNTER (OUTPATIENT)
Dept: GENERAL RADIOLOGY | Facility: HOSPITAL | Age: 85
Discharge: HOME | End: 2025-04-09
Attending: ORTHOPAEDIC SURGERY
Payer: MEDICARE

## 2025-04-09 DIAGNOSIS — M23.8X1: ICD-10-CM

## 2025-04-09 DIAGNOSIS — R52 PAIN: ICD-10-CM

## 2025-04-09 DIAGNOSIS — M17.11 PRIMARY OSTEOARTHRITIS OF RIGHT KNEE: Primary | ICD-10-CM

## 2025-04-09 PROCEDURE — 1160F RVW MEDS BY RX/DR IN RCRD: CPT | Performed by: ORTHOPAEDIC SURGERY

## 2025-04-09 PROCEDURE — 3075F SYST BP GE 130 - 139MM HG: CPT | Performed by: ORTHOPAEDIC SURGERY

## 2025-04-09 PROCEDURE — 73564 X-RAY EXAM KNEE 4 OR MORE: CPT | Performed by: ORTHOPAEDIC SURGERY

## 2025-04-09 PROCEDURE — 3008F BODY MASS INDEX DOCD: CPT | Performed by: ORTHOPAEDIC SURGERY

## 2025-04-09 PROCEDURE — 3078F DIAST BP <80 MM HG: CPT | Performed by: ORTHOPAEDIC SURGERY

## 2025-04-09 PROCEDURE — 20610 DRAIN/INJ JOINT/BURSA W/O US: CPT | Performed by: ORTHOPAEDIC SURGERY

## 2025-04-09 PROCEDURE — 99204 OFFICE O/P NEW MOD 45 MIN: CPT | Performed by: ORTHOPAEDIC SURGERY

## 2025-04-09 PROCEDURE — 1159F MED LIST DOCD IN RCRD: CPT | Performed by: ORTHOPAEDIC SURGERY

## 2025-04-09 NOTE — ASSESSMENT & PLAN NOTE
Orders:    Large joint - right aspiration/injection    triamcinolone acetonide (Kenalog-40) 40 MG/ML injection 40 mg

## 2025-04-09 NOTE — PROGRESS NOTES
Chief Complaint: Knee Pain (Right knee -  Pt had injury in 2024. Pain when going to sit down and at night. Stiffness  when laying down for a while. MRI was done .)      HPI  Ms. Ghotra is referred by No ref. provider found. Gilma Ghotra is a 85 year old female being seen in the office today for Knee Pain (Right knee -  Pt had injury in 2024. Pain when going to sit down and at night. Stiffness  when laying down for a while. MRI was done .)    The patient is a 85-year-old female presents with complaints of right knee pain.  She states that this has been going on for about 4 months.  The pain localizes to the medial aspect of the lateral aspect of the knee.  She notes pain mostly when she is laying and trying to straighten the knee.  She denies any significant pain when she is walking.  She denies any numbness or tingling.  She does have some pain in the back of the calf as well.  She also notes some swelling.  She notes loss of motion.  She also notes the knee locks up on her on occasion which causes severe pain until it pops back into place.  She denies any significant instability.  She does take occasional Tylenol and Voltaren gel.  She has not been in physical therapy in 2 separate occasions.  In the past this has helped.  However, this MRI is not as helpful.  She did see her primary.  She had an MRI.  She was diagnosed with severe osteoarthritis and recommended orthopedic evaluation.  She does think that this started to affect her quality of life.  She feels that when the knee locks up she essentially cannot move.  She is here with her  who she lives with.  She remains quite active.  She does do Dima, and does walk regularly on the treadmill.      History:  Past Medical History[1]  Past Surgical History[2]  Family History[3]  Family Status   Relation Status    Fa     Mo     Kathryn Alive    Son     Self (Not Specified)     Social History      Occupational History    Not on file   Tobacco Use    Smoking status: Never     Passive exposure: Never    Smokeless tobacco: Never   Vaping Use    Vaping status: Never Used   Substance and Sexual Activity    Alcohol use: Yes     Comment: socially    Drug use: No    Sexual activity: Not on file       Medications:  Current Medications[4]    Allergies:  Allergies[5]    Review of Systems  A comprehensive review of systems was completed and is negative unless noted above or in the HPI.    Physical Exam  Ht 4' 11\" (1.499 m)   Wt 102 lb (46.3 kg)   BMI 20.60 kg/m²   Body mass index is 20.6 kg/m².    Constitutional: The patient appears well-developed and well-nourished, in no apparent distress.   Psychiatric: The patient demonstrates good comprehension, judgment and decision making. Normal mood and affect.  Eyes: PER and EOM are normal.  ENT: Hearing appropriate for normal conversation.  Cardiovascular: The patient has symmetric pulses, 2+.  Distal extremity is warm and well perfused with good capillary refill.  Respiratory:  The patient is breathing comfortably without increased respiratory effort or use of accessory muscles.  Hematologic/Lymphatic: No lymphangitis. There is no appreciable enlargement of lymph nodes. No calf swelling, calf non-tender, negative Grant's sign. No edema.  Skin: No wounds or ulcers. No hypertrophic scarring.  Neck: FROM without pain.  MSK:  Gait: Antalgic to the right    right Knee        Alignment: Valgus, minimal correction       Skin: Intact at the knee       Swelling: Minimal   Effusion: Positive   Tenderness: Medial joint line and Lateral joint line       Range of Motion:      Extension: 10     Flexion: 110     Flexion Contracture: 10     Extensor La           McMurrays: Negative   Lachmann: Negative   Anterior Drawer: Negative   Posterior Drawer: Negative   Varus Stress Test: stable   Valgus Stress Test: stable       Patellar Tracking: Laterally   Patellar Crepitus: No    Extensor Mechanism: Intact   Snapping knee: ITB snapping over the lateral femoral condyle and tibial plateau is noted causing severe pain   Hip ROM:   Intact       Sensation: intact   Motor: intact   Vascular: intact     Imaging:  I independently reviewed the patient's relevant imaging studies today.    4 weightbearing views of the affected knee were obtained today.  They demonstrate no acute fracture or dislocation.  They show severe lateral compartment joint space narrowing, osteophyte formation, and subchondral sclerosis, consistent with Kellgren-Michael grade 4 osteoarthritis    Labs:  Lab Results   Component Value Date    WBC 7.1 08/24/2024    HGB 13.7 08/24/2024    HCT 40.6 08/24/2024    .0 08/24/2024     Lab Results   Component Value Date    ALB 4.7 03/03/2025    A1C 6.8 (H) 03/03/2025     Lab Results   Component Value Date     (H) 03/03/2025     (H) 08/24/2024     (H) 02/19/2024       Assessment and Plan  Assessment & Plan  Primary osteoarthritis of right knee    Orders:    Large joint - right aspiration/injection    triamcinolone acetonide (Kenalog-40) 40 MG/ML injection 40 mg    Pain    Orders:    XR KNEE, COMPLETE (4 OR MORE VIEWS), RIGHT (CPT=73564); Future    Snapping knee of right lower extremity         I had a lengthy discussion with the patient today about the patient's knee OA.  Nonoperative and operative options for knee OA were discussed with the patient at length. Nonoperative measures include activity modification, anti-inflammatories, weight loss if applicable, physical therapy or a home exercise program, bracing, cortisone injections and viscosupplementation injections.  Risks and benefits to injections were reviewed including but not limited to infection, skin discoloration/changes, temporary elevations in blood sugars, joint inflammatory reaction, persistent pain or increased pain at the injection site, and the chance that the injection may not help. If they  fail these measures, and continue to suffer from functionally limiting pain that is negatively impacting their quality of life, a knee replacement can be considered.  Understanding all options, the patient mated for a cortisone injection.  I discussed that a cortisone injection would not solve her snapping problem.  It would alleviate some of her pain.  Specifically, I recommend the following:    They were given cortisone injection to the right knee today.  They were recommended low impact aerobic exercises such as biking, elliptical, and swimming/water therapy. They were recommended against high impact activities such as running on hard surfaces and deep squatting activities.  There are no structural abnormalities to their knee, so the patient can be WBAT and activities as tolerated.  Follow up: As needed  The patient understands and agrees with this plan. All of the patient's questions were answered today.    We discussed the diagnosis and treatment options and the patient felt they would benefit from the injection. I described the injection in detail including the risks, which include but are not limited to risk of infection, failure of treatment, local skin discoloration, temporary elevations in blood sugars, inflammatory reaction, persistent pain or increased pain at the injection site. Specifically we cautioned the patient regarding signs of infection and the need for immediate evaluation in this case. The patient verbalized an understanding of these risks and verbally consented to the injection. Allergies were reviewed.     Description of Procedure: An appropriate procedural time-out was performed confirming the patient’s name, date of birth, and the procedure to be performed. Following sterile prep, a mixture of 1mL 40mg/mL Kenalog, 2mL 1% lidocaine and 2mL 0.5% marcaine was injection into the right knee using the inferolateral approach. There were no complications or difficulties. Hemostasis was achieved. A  small sterile band aid dressing was applied. The patient tolerated the procedure well.     Post Injection Instructions: The patient was encouraged to ice the area immediately following the injection and to avoid strenuous activity with the involved extremity for remainder of the day. The patient verbalized an understanding and all of their questions and concerns were addressed.      BMI is is above average; BMI management plan is completed    Sandra Gillespie MD         [1]   Past Medical History:   Basal cell carcinoma (BCC) of right lower extremity    BCC (basal cell carcinoma of skin)    right tibia    Breast cancer (HCC)    s/p R breast mastectomy @ [facility] and on pharmacutical treatment x5 years subsequently (Dr. Harris)    Diabetes (HCC)    diet controlled    Hearing impairment    hard of hearing    High blood pressure    High cholesterol    Hyperlipidemia    Visual impairment    glasses   [2]   Past Surgical History:  Procedure Laterality Date    Biopsy of skin lesion      benign results    Cataract      bilateral     Eduardo biopsy stereo nodule 1 site left (cpt=19081) Left 07/05/2023    yasir stereo for calcs  tophat clip    Mastectomy right  2001 2/2 breast CA, with lymph node removal    Skin tissue rearrangement Right 11/22/2019    Wide excision BCC right leg,flap reconstruction   [3]   Family History  Problem Relation Age of Onset    Heart Attack Father     Stroke Mother     No Known Problems Daughter     Alcohol and Other Disorders Associated Son     Breast Cancer Self 62   [4]   Current Outpatient Medications   Medication Sig Dispense Refill    enalapril 2.5 MG Oral Tab TOME 1 TABLETA POR LA BOCA CADA  DEBI 100 tablet 2    atorvastatin 10 MG Oral Tab TOME MEDIA TABLETA POR LA BOCA  CADA NOCHE 50 tablet 2    diclofenac (VOLTAREN ARTHRITIS PAIN) 1 % External Gel Apply 2 g topically 4 (four) times daily. 100 g 1    triamcinolone 0.1 % External Cream Apply topically 2 (two) times daily as  needed. 60 g 3    Multiple Vitamin (MULTI-VITAMIN) Oral Tab Take 1 tablet by mouth daily.     [5] No Known Allergies

## 2025-04-10 VITALS
WEIGHT: 102 LBS | HEART RATE: 81 BPM | BODY MASS INDEX: 20.56 KG/M2 | DIASTOLIC BLOOD PRESSURE: 71 MMHG | SYSTOLIC BLOOD PRESSURE: 138 MMHG | HEIGHT: 59 IN

## 2025-04-10 RX ORDER — TRIAMCINOLONE ACETONIDE 40 MG/ML
40 INJECTION, SUSPENSION INTRA-ARTICULAR; INTRAMUSCULAR ONCE
Status: COMPLETED | OUTPATIENT
Start: 2025-04-10 | End: 2025-04-10

## 2025-04-10 RX ADMIN — TRIAMCINOLONE ACETONIDE 40 MG: 40 INJECTION, SUSPENSION INTRA-ARTICULAR; INTRAMUSCULAR at 13:36:00

## 2025-04-10 NOTE — PROGRESS NOTES
Per Dr. Gillespie draw up 2ml of Lidocaine 2%, 2ml Marcaine, and 1ml of Kenolag 40 for right knee.

## 2025-05-05 ENCOUNTER — PATIENT OUTREACH (OUTPATIENT)
Dept: CASE MANAGEMENT | Age: 85
End: 2025-05-05

## 2025-05-05 ENCOUNTER — TELEPHONE (OUTPATIENT)
Age: 85
End: 2025-05-05

## 2025-05-05 DIAGNOSIS — E11.9 CONTROLLED TYPE 2 DIABETES MELLITUS WITHOUT COMPLICATION, WITHOUT LONG-TERM CURRENT USE OF INSULIN (HCC): Primary | ICD-10-CM

## 2025-05-05 DIAGNOSIS — H40.003 GLAUCOMA SUSPECT OF BOTH EYES: ICD-10-CM

## 2025-05-05 DIAGNOSIS — M17.11 PRIMARY OSTEOARTHRITIS OF RIGHT KNEE: ICD-10-CM

## 2025-05-05 DIAGNOSIS — E78.00 HYPERCHOLESTEREMIA: ICD-10-CM

## 2025-05-05 DIAGNOSIS — G89.29 CHRONIC PAIN OF RIGHT ANKLE: ICD-10-CM

## 2025-05-05 DIAGNOSIS — I10 ESSENTIAL HYPERTENSION: ICD-10-CM

## 2025-05-05 DIAGNOSIS — M25.571 CHRONIC PAIN OF RIGHT ANKLE: ICD-10-CM

## 2025-05-05 DIAGNOSIS — Z85.3 PERSONAL HISTORY OF BREAST CANCER: ICD-10-CM

## 2025-05-05 NOTE — TELEPHONE ENCOUNTER
Tommy Adams MD to Me        5/5/25  2:01 PM  Over the counter recommended.  Continue flonase or nasal saline spray       [facility] gave recommendations to Gilma as above and she agreed to do as instructed, she will call in case symptoms persist and/or get worse. CCM advised Gilma to check temperature, she agreed to obtain dyquil to use during the day and continue with nyquil for the night time.

## 2025-05-05 NOTE — PROGRESS NOTES
Gilma wanted to have [facility] send a message to PCP's office. She is having cold/flu symptoms and is already using Nyquil but makes her very drowsy even throughout the day. Gilma wants to ask PCP if he can recommend or Rx her something for her symptoms.    TE sent to PCP's office for assiatance.      TE was sent back to CCM and per PCP ok to have her use OTC medications. [facility] informed Gilma of the outcome and will do as instructed by MD.      5/5/25  2:16 PM  Note     Tommy Adams MD to Me        5/5/25  2:01 PM  Over the counter recommended.  Continue flonase or nasal saline spray         CCM gave recommendations to Gilma as above and she agreed to do as instructed, she will call in case symptoms persist and/or get worse. [facility] advised Gilma to check temperature, she agreed to obtain dyquil to use during the day and continue with nyquil for the night time.          CCM to call Gilma next week to perform Monthly assessment.    Total time - 8 min  Total Monthly time- 8 min     [No] : No [No falls in past year] : Patient reported no falls in the past year [Not at All (0)] : 9.) Thoughts that you would be off dead or of hurting yourself in some way? Not at all [PHQ-9 Negative - No further assessment needed] : PHQ-9 Negative - No further assessment needed [Hepatitis C test offered] : Hepatitis C test offered [None] : None [With Family] : lives with family [Employed] : employed [Sexually Active] : sexually active [Feels Safe at Home] : Feels safe at home [Fully functional (bathing, dressing, toileting, transferring, walking, feeding)] : Fully functional (bathing, dressing, toileting, transferring, walking, feeding) [Fully functional (using the telephone, shopping, preparing meals, housekeeping, doing laundry, using] : Fully functional and needs no help or supervision to perform IADLs (using the telephone, shopping, preparing meals, housekeeping, doing laundry, using transportation, managing medications and managing finances) [Reports normal functional visual acuity (ie: able to read med bottle)] : Reports normal functional visual acuity [Smoke Detector] : smoke detector [Carbon Monoxide Detector] : carbon monoxide detector [Safety elements used in home] : safety elements used in home [Seat Belt] :  uses seat belt [Sunscreen] : uses sunscreen [Never] : Never [HIV test declined] : HIV test declined [] :  [# Of Children ___] : has [unfilled] children [[id number]] : 0 [Change in mental status noted] : No change in mental status noted [Language] : denies difficulty with language [Behavior] : denies difficulty with behavior [Learning/Retaining New Information] : denies difficulty learning/retaining new information [Handling Complex Tasks] : denies difficulty handling complex tasks [Reasoning] : denies difficulty with reasoning [Spatial Ability and Orientation] : denies difficulty with spatial ability and orientation [High Risk Behavior] : no high risk behavior [Reports changes in hearing] : Reports no changes in hearing [Reports changes in vision] : Reports no changes in vision [Reports changes in dental health] : Reports no changes in dental health [Guns at Home] : no guns at home [Travel to Developing Areas] : does not  travel to developing areas [TB Exposure] : is not being exposed to tuberculosis [Caregiver Concerns] : does not have caregiver concerns

## 2025-05-05 NOTE — TELEPHONE ENCOUNTER
Gilma is calling [facility] asking for assistance, she wants to notify her PCP that she is dealing with a cold/flu symptoms, she has chills, dry cough, unknown fever because she has not checked it. The patient is using Nyquil and is looking to see if MD can Rx her a cough syrup to the pharmacy.    Please triage and advise, thank you.

## 2025-05-09 NOTE — PROGRESS NOTES
5/9/2025  Spoke to Gilma for CCM.      Updates to patient care team/comments:  Added Dr Gillespie, orthopaedic.  Patient reported changes in medications: None   Med Adherence  Comment: taking as prescribed     Health Maintenance: UTD/Reviewed with patient  Health Maintenance   Topic Date Due    Diabetes Care Dilated Eye Exam  05/30/2025 is already scheduled for next month     Colorectal Cancer Screening  07/29/2025 (Originally 1/7/1940)    Zoster Vaccines (1 of 2) 01/04/2026 (Originally 1/7/1990)    COVID-19 Vaccine (6 - 2024-25 season) 04/07/2026 (Originally 9/1/2024)    Diabetes Care Foot Exam  08/28/2025    Diabetes Care A1C  09/03/2025    Influenza Vaccine (Season Ended) 10/01/2025    Diabetes Care: GFR  03/03/2026    DEXA Scan  Completed    Annual Well Visit  Completed    Annual Depression Screening  Completed    Fall Risk Screening (Annual)  Completed    Diabetes Care: Microalb/Creat Ratio (Annual)  Completed    Pneumococcal Vaccine: 50+ Years  Completed    Meningococcal B Vaccine  Aged Out       Patient updates/concerns:     The patient is reporting to [facility] to be doing well, she is feeling much better after recovering from a cold/flu symptoms she was experiencing last week. Gilma is taking medications as prescribed and is trying to control her sugars by eating healthier.    The patient  was seen by Dr Crawley last month, per Gilma she was found to be stable on her glaucoma suspects, she will be seeing Dr Rogers next month and in that visit she will be getting a diabetic eye exam, she knows she needs to have one by June, Gilma agreed to ask ophthalmologist to perform such exam.    The patient was reminded by [facility] the need to repeat a BMP in June she was found to have low sodium in March and her PCP order a BMP to be repeated in June to make sure her levels go up, she was at 128. The patient agreed to go on/or around June 3rd, she is aware there is no need to be fasting to get test done.     The patient is now  feeling better from pain she was dealing with in her right knee, she was seen by the orthopaedic doctor on 04/07 and an injection of cortisone was given in the right knee, she can now feel the difference and has very minimal to no pain at all. The patient does home exercises as well. The patient had an MRI done on 04/01 and was referred to Dr Gillespie, who reccommended the injection for the knee and patient accepted the treatment.      Goals/Action Plan:    Active goal from previous outreach: recover completely from her knee pain    Patient reported progress towards goals: ongoing and working towards her goal               - What: recover completely from her knee pain           - Where/When/How: continue with aerobics at the gym and with home exercises   Patient Reported Barriers and Concerns: None                   - Plan for overcoming barriers: N/A    Care Managers Interventions:     CCM ecouraged the patient to take the time to do the things she enjoys, continue a balanced diet and good nutrition to stay healthy and lower her sugar levels to  prevent further complications.    CCM motivated the patient to stay active and maintain a balanced physical activity.    CCM reminded Gilma to get BMP done in June.    CCM reviewed patient's medication list and updated care team providers.    Future Appointments:   Future Appointments   Date Time Provider Department Center   9/8/2025 11:40 AM Tommy Adams MD [id number]     Next Care Manager Follow Up Date: in a month or sooner if needed.    Reason For Follow Up: review progress and or barriers towards patient's goals.     Time Spent This Encounter Total: 13 min medical record review, telephone communication, care plan updates where needed, education, goals, and action plan recreation/update. Provided acknowledgment and validation to patient's concerns.   Monthly Minute Total including today: 21  Physical assessment, complete health history, and need for CCM  established by Tommy Adams MD.

## 2025-06-06 ENCOUNTER — LAB ENCOUNTER (OUTPATIENT)
Dept: LAB | Facility: HOSPITAL | Age: 85
End: 2025-06-06
Attending: INTERNAL MEDICINE
Payer: MEDICARE

## 2025-06-06 DIAGNOSIS — E87.1 HYPONATREMIA: ICD-10-CM

## 2025-06-06 LAB
ANION GAP SERPL CALC-SCNC: 6 MMOL/L (ref 0–18)
BUN BLD-MCNC: 10 MG/DL (ref 9–23)
BUN/CREAT SERPL: 17.5 (ref 10–20)
CALCIUM BLD-MCNC: 10 MG/DL (ref 8.7–10.4)
CHLORIDE SERPL-SCNC: 96 MMOL/L (ref 98–112)
CO2 SERPL-SCNC: 27 MMOL/L (ref 21–32)
CREAT BLD-MCNC: 0.57 MG/DL (ref 0.55–1.02)
EGFRCR SERPLBLD CKD-EPI 2021: 90 ML/MIN/1.73M2 (ref 60–?)
FASTING STATUS PATIENT QL REPORTED: YES
GLUCOSE BLD-MCNC: 108 MG/DL (ref 70–99)
OSMOLALITY SERPL CALC.SUM OF ELEC: 268 MOSM/KG (ref 275–295)
POTASSIUM SERPL-SCNC: 4.3 MMOL/L (ref 3.5–5.1)
SODIUM SERPL-SCNC: 129 MMOL/L (ref 136–145)

## 2025-06-06 PROCEDURE — 80048 BASIC METABOLIC PNL TOTAL CA: CPT

## 2025-06-06 PROCEDURE — 36415 COLL VENOUS BLD VENIPUNCTURE: CPT

## 2025-06-09 ENCOUNTER — TELEPHONE (OUTPATIENT)
Age: 85
End: 2025-06-09

## 2025-06-09 DIAGNOSIS — M17.11 PRIMARY OSTEOARTHRITIS OF RIGHT KNEE: ICD-10-CM

## 2025-06-09 NOTE — TELEPHONE ENCOUNTER
Discussed her results with her.  Her sodium improved slightly.  She is increasing hydration.  Will recheck at next visit.       Dr. Adams

## 2025-06-10 ENCOUNTER — PATIENT OUTREACH (OUTPATIENT)
Dept: CASE MANAGEMENT | Age: 85
End: 2025-06-10

## 2025-06-10 DIAGNOSIS — M85.89 OSTEOPENIA OF MULTIPLE SITES: ICD-10-CM

## 2025-06-10 DIAGNOSIS — E87.1 HYPONATREMIA: ICD-10-CM

## 2025-06-10 DIAGNOSIS — E11.9 CONTROLLED TYPE 2 DIABETES MELLITUS WITHOUT COMPLICATION, WITHOUT LONG-TERM CURRENT USE OF INSULIN (HCC): Primary | ICD-10-CM

## 2025-06-10 DIAGNOSIS — I10 ESSENTIAL HYPERTENSION: ICD-10-CM

## 2025-06-10 NOTE — PROGRESS NOTES
6/10/2025  Spoke to Gilma for CCM.      Updates to patient care team/comments:  UTD/Reviewed with patient  Patient reported changes in medications: None   Med Adherence  Comment: taking as prescribed     Health Maintenance: UTD/Reviewed with patient  Health Maintenance   Topic Date Due    Diabetes Care Dilated Eye Exam  05/30/2025 had eye exam done in May    Colorectal Cancer Screening  07/29/2025 (Originally 1/7/1940)    Zoster Vaccines (1 of 2) 01/04/2026 (Originally 1/7/1990)    COVID-19 Vaccine (6 - 2024-25 season) 04/07/2026 (Originally 9/1/2024)    Diabetes Care Foot Exam  08/28/2025    Diabetes Care A1C  09/03/2025    Influenza Vaccine (Season Ended) 10/01/2025    Diabetes Care: GFR  06/06/2026    DEXA Scan  Completed    Annual Well Visit  Completed    Annual Depression Screening  Completed    Fall Risk Screening (Annual)  Completed    Diabetes Care: Microalb/Creat Ratio (Annual)  Completed    Pneumococcal Vaccine: 50+ Years  Completed    Meningococcal B Vaccine  Aged Out       Patient updates/concerns:     The patient is reporting to [facility] to be doing well, she is taking medications as prescribed with no side effects and has no concerns on her medications.     The patient is reporting to [facility] to be dealing with some insomnia issues but she is not sure if is also related to her leg cramps that she usually deals with more when she is resting. The patient is asking [facility] for advise in what to use that is not addictive.    The patient just had labs done and the results were discussed briefly by the PCP during her 's office visit with him as well, results discussed on 06/09/25, she will have a repeat lab in three months, she should have the labs done prior to the visit she has already scheduled for 09/08/25.    The patient is not reporting any weight changes.    Goals/Action Plan:    Active goal outreach:  have her sodium levels go up    Patient reported progress towards goals: ongoing and working towards her  goal               - What: have her sodium levels go up           - Where/When/How: hydrate better, increase salt intake   Patient Reported Barriers and Concerns: none                   - Plan for overcoming barriers: N/A    Care Managers Interventions:     CCM is recommending the patient to try Melatonin 3 mg, once per night to see if this will benefit with her insomnia, she agreed to the plan and will be contacting PCP in case OTC meds fails to help her.    Listened to patient's concerns, provided support, and encouraged Gilma to meet goals. Informed of the importance on reporting any new symptoms to prevent any health complications.    Discussed dietary modification such as portion control, meal choices, and timing of meals. Provided diabetic friendly food choices to consider when grocery shopping, recommended more whole grains, proteins, and vegetables.     Advised patient to focus on eating healthier meals, keep doctor's appointment, and increase exercise.     Continued to provide education on how to better manage and cope with chronic conditions. Informed of the importance on reporting any new symptoms to prevent any health complications.     Advised patient to drink plenty of water in order stay hydrated during hot weather due to high temperatures could cause blood sugar levels to fluctuate especially during physical activity.     Future Appointments:   Future Appointments   Date Time Provider Department Center   9/8/2025 11:40 AM [facility] MD [id number]     Next Care Manager Follow Up Date: in a month or sooner if needed.    Reason For Follow Up: review progress and or barriers towards patient's goals.     Time Spent This Encounter Total: 21 min medical record review, telephone communication, care plan updates where needed, education, goals, and action plan recreation/update. Provided acknowledgment and validation to patient's concerns.   Monthly Minute Total including today: 21  Physical  assessment, complete health history, and need for CCM established by Tommy Adams MD.

## 2025-07-07 ENCOUNTER — NURSE TRIAGE (OUTPATIENT)
Age: 85
End: 2025-07-07

## 2025-07-07 ENCOUNTER — PATIENT OUTREACH (OUTPATIENT)
Dept: CASE MANAGEMENT | Age: 85
End: 2025-07-07

## 2025-07-07 ENCOUNTER — HOSPITAL ENCOUNTER (EMERGENCY)
Facility: HOSPITAL | Age: 85
Discharge: HOME | End: 2025-07-07
Attending: EMERGENCY MEDICINE
Payer: MEDICARE

## 2025-07-07 ENCOUNTER — APPOINTMENT (OUTPATIENT)
Dept: CT IMAGING | Facility: HOSPITAL | Age: 85
End: 2025-07-07
Attending: EMERGENCY MEDICINE
Payer: MEDICARE

## 2025-07-07 ENCOUNTER — APPOINTMENT (OUTPATIENT)
Dept: GENERAL RADIOLOGY | Facility: HOSPITAL | Age: 85
End: 2025-07-07
Attending: EMERGENCY MEDICINE
Payer: MEDICARE

## 2025-07-07 VITALS
HEART RATE: 67 BPM | RESPIRATION RATE: 15 BRPM | TEMPERATURE: 98 F | OXYGEN SATURATION: 99 % | DIASTOLIC BLOOD PRESSURE: 72 MMHG | SYSTOLIC BLOOD PRESSURE: 169 MMHG

## 2025-07-07 DIAGNOSIS — S09.90XA INJURY OF HEAD, INITIAL ENCOUNTER: ICD-10-CM

## 2025-07-07 DIAGNOSIS — M85.89 OSTEOPENIA OF MULTIPLE SITES: ICD-10-CM

## 2025-07-07 DIAGNOSIS — I10 ESSENTIAL HYPERTENSION: ICD-10-CM

## 2025-07-07 DIAGNOSIS — M17.11 PRIMARY OSTEOARTHRITIS OF RIGHT KNEE: Primary | ICD-10-CM

## 2025-07-07 DIAGNOSIS — S40.012A CONTUSION OF LEFT SHOULDER, INITIAL ENCOUNTER: ICD-10-CM

## 2025-07-07 DIAGNOSIS — E11.9 CONTROLLED TYPE 2 DIABETES MELLITUS WITHOUT COMPLICATION, WITHOUT LONG-TERM CURRENT USE OF INSULIN (HCC): ICD-10-CM

## 2025-07-07 DIAGNOSIS — G89.29 CHRONIC PAIN OF RIGHT KNEE: ICD-10-CM

## 2025-07-07 DIAGNOSIS — S80.02XA CONTUSION OF LEFT KNEE, INITIAL ENCOUNTER: Primary | ICD-10-CM

## 2025-07-07 DIAGNOSIS — M25.561 CHRONIC PAIN OF RIGHT KNEE: ICD-10-CM

## 2025-07-07 PROCEDURE — 70450 CT HEAD/BRAIN W/O DYE: CPT | Performed by: RADIOLOGY

## 2025-07-07 PROCEDURE — 73030 X-RAY EXAM OF SHOULDER: CPT | Performed by: RADIOLOGY

## 2025-07-07 PROCEDURE — 99284 EMERGENCY DEPT VISIT MOD MDM: CPT

## 2025-07-07 PROCEDURE — 99285 EMERGENCY DEPT VISIT HI MDM: CPT

## 2025-07-07 PROCEDURE — 73562 X-RAY EXAM OF KNEE 3: CPT | Performed by: RADIOLOGY

## 2025-07-07 RX ORDER — HYDROCODONE BITARTRATE AND ACETAMINOPHEN 5; 325 MG/1; MG/1
1 TABLET ORAL ONCE
Refills: 0 | Status: COMPLETED | OUTPATIENT
Start: 2025-07-07 | End: 2025-07-07

## 2025-07-07 NOTE — TELEPHONE ENCOUNTER
Patient will go to [place] ED.  Reason for Disposition   Can't stand (bear weight) or walk and new-onset after fall    Answer Assessment - Initial Assessment Questions  1. MECHANISM: \"How did the fall happen?\"      Fell today at the gym tripped and stumbled   2. DOMESTIC VIOLENCE AND ELDER ABUSE SCREENING: \"Did you fall because someone pushed you or tried to hurt you?\" If Yes, ask: \"Are you safe now?\"      NO  3. ONSET: \"When did the fall happen?\" (e.g., minutes, hours, or days ago)      7/7/27  4. LOCATION: \"What part of the body hit the ground?\" (e.g., back, buttocks, head, hips, knees, hands, head, stomach)      Left head/ left shoulder and left knee   5. INJURY: \"Did you hurt (injure) yourself when you fell?\" If Yes, ask: \"What did you injure? Tell me more about this?\" (e.g., body area; type of injury; pain severity)\"      Head/ knee shoulder   6. PAIN: \"Is there any pain?\" If Yes, ask: \"How bad is the pain?\" (e.g., Scale 1-10; or mild,   moderate, severe)    - NONE (0): No pain    - MILD (1-3): Doesn't interfere with normal activities     - MODERATE (4-7): Interferes with normal activities or awakens from sleep     - SEVERE (8-10): Excruciating pain, unable to do any normal activities       8/10  7. SIZE: For cuts, bruises, or swelling, ask: \"How large is it?\" (e.g., inches or centimeters) 2 domingo size bruised.         8. PREGNANCY: \"Is there any chance you are pregnant?\" \"When was your last menstrual period?\"      NO  9. OTHER SYMPTOMS: \"Do you have any other symptoms?\" (e.g., dizziness, fever, weakness; new onset or worsening).       Left knee with swelling   10. CAUSE: \"What do you think caused the fall (or falling)?\" (e.g., tripped, dizzy spell)        Tripped    Protocols used: Falls and Eydnzpy-H-DV

## 2025-07-07 NOTE — TELEPHONE ENCOUNTER
Gilma was at the gym when she fell trying to get on a bike, she hit her knee first and shoulder after, she thinks she hit her head against a wall but not sure, she has pain in her shoulder and knees, she stated she was going home and was going to use Celebrex and voltaren gel for the pain. Accident happened today.    Please triage and advise.

## 2025-07-07 NOTE — ED INITIAL ASSESSMENT (HPI)
Pt to ed w/c of fall today's morning. Per pt, she was walking at the gym when she fell. Pt states she was able to get up by holding to objects and states performing about 45 mins of walking and riding stationary bicycle. Pt reports increased pain. Pt denies loc. Reports pain on her R knee, L shoulder and L side of forehead. Pt reports being unable to bear weight on L leg due to pain. Denies thinner use

## 2025-07-07 NOTE — PROGRESS NOTES
Gilma contacted CCM about a fall she just suffered today and is asking CCM for advise.    Gilma was at the gym when she fell trying to get on a bike, she hit her knee first and shoulder after, she thinks she hit her head against a wall but not sure, she has pain in her shoulder and knees, she stated she was going home and was going to use Celebrex and voltaren gel for the pain.    CCM to send message to PCP's office for further assistance.    Total time - 8 min  Total Monthly time- 8 min

## 2025-07-08 NOTE — ED PROVIDER NOTES
Patient Seen in: [facility] Emergency Department        History  Chief Complaint   Patient presents with    Fall     Stated Complaint: Fall, Left knee and left shoulder pain, Hit head, -thinners    Subjective:   HPI            Patient presents the emergency department complaining of pain after a fall.  She states that she fell at about 11:00 this morning injuring her left shoulder, left knee and she struck her head.  She denies neck pain, loss of consciousness.  She states that she was able to get up and get on an exercise bike for a period of time afterwards to try to stretch out the joints that were hurting her but after showering felt that she was having specific and significant pain in her left knee.  She denies numbness or tingling.  There is no other aggravating or alleviating factors.  There is no chest pain or abdominal pain.  There is no syncope or presyncope prior to the fall.      Objective:     Past Medical History:    Basal cell carcinoma (BCC) of right lower extremity    BCC (basal cell carcinoma of skin)    right tibia    Breast cancer (HCC)    s/p R breast mastectomy @ [facility] and on pharmacutical treatment x5 years subsequently (Dr. Harris)    Diabetes (HCC)    diet controlled    Hearing impairment    hard of hearing    High blood pressure    High cholesterol    Hyperlipidemia    Visual impairment    glasses              Past Surgical History:   Procedure Laterality Date    Biopsy of skin lesion      benign results    Cataract      bilateral     Eduardo biopsy stereo nodule 1 site left (cpt=19081) Left 07/05/2023    yasir stereo for calcs  tophat clip    Mastectomy right  2001 2/2 breast CA, with lymph node removal    Skin tissue rearrangement Right 11/22/2019    Wide excision BCC right leg,flap reconstruction                Social History     Socioeconomic History    Marital status:    Tobacco Use    Smoking status: Never     Passive exposure: Never    Smokeless  tobacco: Never   Vaping Use    Vaping status: Never Used   Substance and Sexual Activity    Alcohol use: Yes     Comment: socially    Drug use: No   Other Topics Concern    Breast feeding No    Reaction to local anesthetic No    Pt has a pacemaker No    Pt has a defibrillator No     Social Drivers of Health     Food Insecurity: No Food Insecurity (1/9/2025)    Food Insecurity     Food Insecurity: Never true   Transportation Needs: No Transportation Needs (1/9/2025)    Transportation Needs     Lack of Transportation: No   Housing Stability: Low Risk  (1/9/2025)    Housing Stability     Housing Instability: No                                Physical Exam    ED Triage Vitals [07/07/25 1751]   BP (!) 179/78   Pulse 77   Resp 19   Temp 98 °F (36.7 °C)   Temp src    SpO2 97 %   O2 Device None (Room air)       Current Vitals:   Vital Signs  BP: (!) 169/72  Pulse: 67  Resp: 15  Temp: 98 °F (36.7 °C)  MAP (mmHg): (!) 102    Oxygen Therapy  SpO2: 99 %  O2 Device: None (Room air)            Physical Exam  Vitals and nursing note reviewed.   Constitutional:       General: She is not in acute distress.     Appearance: She is well-developed.   HENT:      Head: Normocephalic.      Nose: Nose normal.      Mouth/Throat:      Mouth: Mucous membranes are moist.   Eyes:      Conjunctiva/sclera: Conjunctivae normal.   Cardiovascular:      Rate and Rhythm: Normal rate and regular rhythm.      Heart sounds: No murmur heard.  Pulmonary:      Effort: Pulmonary effort is normal. No respiratory distress.      Breath sounds: Normal breath sounds.   Abdominal:      General: There is no distension.      Palpations: Abdomen is soft.      Tenderness: There is no abdominal tenderness.   Musculoskeletal:      Cervical back: Normal range of motion and neck supple. No rigidity or tenderness.      Comments: The left lower leg was examined.  There are strong pulses in the foot and ankle with normal sensation to light touch and capillary fill brisk and  likely due to seconds.  The left knee is swollen and tender.  She is unable to perform a straight leg raise.  There is a questionable high riding patella.  The hip is nontender.  The left upper extremity was examined.  There are strong pulses in the wrist.  Intrinsic muscles in the hand intact.  There is diffuse tenderness about the left shoulder.  Range of motion is preserved.   Lymphadenopathy:      Cervical: No cervical adenopathy.   Skin:     General: Skin is warm and dry.      Capillary Refill: Capillary refill takes less than 2 seconds.      Findings: No rash.   Neurological:      General: No focal deficit present.      Mental Status: She is alert and oriented to person, place, and time.      Cranial Nerves: No cranial nerve deficit.      Sensory: No sensory deficit.      Motor: No weakness.      Coordination: Coordination normal.                 ED Course  Labs Reviewed - No data to display                         MDM             Medical Decision Making  Differential diagnosis considered for intracranial hemorrhage, skull fracture, fracture, dislocation, contusion.    Problems Addressed:  Contusion of left knee, initial encounter: acute illness or injury  Contusion of left shoulder, initial encounter: acute illness or injury  Injury of head, initial encounter: acute illness or injury    Amount and/or Complexity of Data Reviewed  Radiology: ordered and independent interpretation performed. Decision-making details documented in ED Course.     Details: Left knee x-ray shows swelling but no fracture or dislocation.  Left shoulder x-ray shows no fracture or dislocation.  CT scan of the brain shows no intracranial hemorrhage or skull fracture.  Discussion of management or test interpretation with external provider(s): Ace wrap applied to the left knee.  Patient ambulated without difficulty with a walker.  Recommend Tylenol and ice and follow-up with orthopedics.    Risk  OTC drugs.        Disposition and Plan      Clinical Impression:  1. Contusion of left knee, initial encounter    2. Contusion of left shoulder, initial encounter    3. Injury of head, initial encounter         Disposition:  Discharge  7/7/2025 10:12 pm    Follow-up:  Tommy Adams MD  [address]  [address]  [facility] [address]  [phone]    Schedule an appointment as soon as possible for a visit      Gildardo Salgado MD  [address]  [address]  [place] [id number]  [phone]    Schedule an appointment as soon as possible for a visit      We recommend that you schedule follow up care with a primary care provider within the next three months to obtain basic health screening including reassessment of your blood pressure.      Medications Prescribed:  Discharge Medication List as of 7/7/2025 10:14 PM                Supplementary Documentation:

## 2025-07-09 NOTE — PROGRESS NOTES
YAIR received a call from patient she is in need of appointment with orthopaedic doctor, she needs to see Dr Gillespie but was referred to Dr Salgado, she want to see the same doctor she saw last time for her knee.  7 mins with Gilma MAZARIEGOS called the ortho dept and was able to schedule her for 07/10 at 1015 am, [address] in the [facility] location.   Glenda was able to schedule her the appointment.  6 mins with ortho.      YAIR communicated to Gilma of the appointment and accepted it.  2 mins    Total time - 17 min  Total Monthly time- 25 min

## 2025-07-10 ENCOUNTER — OFFICE VISIT (OUTPATIENT)
Age: 85
End: 2025-07-10
Payer: COMMERCIAL

## 2025-07-10 DIAGNOSIS — M17.12 PRIMARY OSTEOARTHRITIS OF LEFT KNEE: Primary | ICD-10-CM

## 2025-07-10 PROCEDURE — 99214 OFFICE O/P EST MOD 30 MIN: CPT | Performed by: ORTHOPAEDIC SURGERY

## 2025-07-10 PROCEDURE — 1159F MED LIST DOCD IN RCRD: CPT | Performed by: ORTHOPAEDIC SURGERY

## 2025-07-10 PROCEDURE — 1160F RVW MEDS BY RX/DR IN RCRD: CPT | Performed by: ORTHOPAEDIC SURGERY

## 2025-07-10 NOTE — PROGRESS NOTES
Chief Complaint: Left knee Pain    History of Present Illness:  Gilma Ghotra is a 85 year old female with a 3-day history of left knee pain.   He sustained a fall while walking in the gym.  She noted pain to the left knee.  She went to the emergency room.  She had x-rays.  These were negative for fracture.  She was recommended orthopedic follow-up.  She presents today for orthopedic follow-up.  She localizes swelling to the knee.  Denies any significant pain.  Denies any instability or mechanical symptoms.  Prior this fall she had no issues with this knee.  She is here with her .  She is not using any ambulatory aids.  She is using Tylenol and Voltaren gel.    Allergies[1]    Past Surgical History[2]    Past Medical History[3]    Current Medications[4]    Social History     Occupational History    Not on file   Tobacco Use    Smoking status: Never     Passive exposure: Never    Smokeless tobacco: Never   Vaping Use    Vaping status: Never Used   Substance and Sexual Activity    Alcohol use: Yes     Comment: socially    Drug use: No    Sexual activity: Not on file       Family History[5]    Family Status   Relation Status    Fa     Mo     Kathryn Alive    Son     Self (Not Specified)       REVIEW OF SYSTEMS  A 12 point review of systems was conducted, pertinent only to the HPI and history noted above.    Constitutional: Negative, except as mentioned in above history  Eyes, Ears, Nose, Throat: Negative, except as mentioned in above history  Cardiovascular: Negative, except as mentioned in above history  Respiratory: Negative, except as mentioned in above history  GI: Negative, except as mentioned in above history  : Negative, except as mentioned in above history  Skin: Negative, except as mentioned in above history  Neuro: Negative, except as mentioned in above history  Endocrine/Heme/Lymph: Negative, except as mentioned in above history  Immunologic: Negative, except as mentioned in  above history  Psychiatric: Negative, except as mentioned in above history  Musculoskeletal: See HPI    PHYSICAL EXAM  There were no vitals filed for this visit.    There is no height or weight on file to calculate BMI.    Constitutional: Well appearing female in no apparent distress.    Psych: Mood and affect are appropriate for the situation. Alert and Oriented x 3.  Eyes: Sclera anicteric  HENT: Hearing appropriate for normal conversation  Neck: appears symmetric with no gross thyromegaly  Pulm: No labored breathing, no wheezing  CVS: Regular rate and rhythm  Skin: No rashes, erythema, or induration  Gait: Antalgic  Left knee        Alignment: None       Skin: Intact       Swelling: None   Effusion: Positive   Tenderness: None       Range of Motion:      Extension: 0     Flexion: 110     Flexion Contracture: 0     Extensor La       McMurrays: Negative   Lachmann: Negative   Anterior Drawer: Negative   Posterior Drawer: Negative   Varus Stress Test: stable   Valgus Stress Test: stable       Patellar Tracking: Centrally   Patellar Apprehension: No   Extensor Mechanism: Intact       Hip ROM:   Intact       Sensation: Intact distally   Motor: Intact ankle and toe extension and flexion   Vascular: Palpable pulse     Imaging and Special Studies:  I independently reviewed the patient's relevant imaging studies today.  2 views of the left knee from the emergency room visit on 2025 demonstrate no acute fracture or dislocation.  There is a large effusion.  There are degenerative changes noted.    Impression:  Assessment & Plan  Primary osteoarthritis of left knee           Plan:  I had a lengthy discussion today about the patient's injury.  I suspect that she has a flareup of her left knee osteoarthritis.  Treatment options were discussed with the patient at length.  I offered her an aspiration and a cortisone injection, but patient declined.  I recommend a conservative treatment approach instead.  Specifically, I  recommend the following:    Rest, ice, compression, elevation, anti-inflammatories/Tylenol for symptoms.  Activities: She can be weightbearing as tolerated.  Follow up: If no improvement in 2 to 3 weeks, she will like in for cortisone injection.  The patient understands and agrees with this plan. All of the patient's questions were answered today.    BMI is in the acceptable range    Sandra Gillespie MD         [1] No Known Allergies  [2]   Past Surgical History:  Procedure Laterality Date    Biopsy of skin lesion      benign results    Cataract      bilateral     Eduardo biopsy stereo nodule 1 site left (cpt=19081) Left 07/05/2023    yasir stereo for calcs  tophat clip    Mastectomy right  2001 2/2 breast CA, with lymph node removal    Skin tissue rearrangement Right 11/22/2019    Wide excision BCC right leg,flap reconstruction   [3]   Past Medical History:   Basal cell carcinoma (BCC) of right lower extremity    BCC (basal cell carcinoma of skin)    right tibia    Breast cancer (HCC)    s/p R breast mastectomy @ [facility] and on pharmacutical treatment x5 years subsequently (Dr. Harris)    Diabetes (HCC)    diet controlled    Hearing impairment    hard of hearing    High blood pressure    High cholesterol    Hyperlipidemia    Visual impairment    glasses   [4]   Current Outpatient Medications   Medication Sig Dispense Refill    diclofenac (VOLTAREN ARTHRITIS PAIN) 1 % External Gel Apply 2 g topically 4 (four) times daily. 100 g 1    enalapril 2.5 MG Oral Tab TOME 1 TABLETA POR LA BOCA CADA  DEBI 100 tablet 2    atorvastatin 10 MG Oral Tab TOME MEDIA TABLETA POR LA BOCA  CADA NOCHE 50 tablet 2    triamcinolone 0.1 % External Cream Apply topically 2 (two) times daily as needed. 60 g 3    Multiple Vitamin (MULTI-VITAMIN) Oral Tab Take 1 tablet by mouth in the morning.     [5]   Family History  Problem Relation Age of Onset    Heart Attack Father     Stroke Mother     No Known Problems Daughter      Alcohol and Other Disorders Associated Son     Breast Cancer Self 61      yes

## 2025-07-16 ENCOUNTER — PATIENT OUTREACH (OUTPATIENT)
Dept: CASE MANAGEMENT | Age: 85
End: 2025-07-16

## 2025-07-16 NOTE — PROGRESS NOTES
The patient is maintaining good health and is stable on her chronic conditions.       Patient is being graduated from [employer] and can contact their PCP office if they are in need of more guidance in the future, she agreed to the plan.

## 2025-07-24 DIAGNOSIS — M17.11 PRIMARY OSTEOARTHRITIS OF RIGHT KNEE: ICD-10-CM

## (undated) DIAGNOSIS — E11.9 DIABETIC EYE EXAM (HCC): ICD-10-CM

## (undated) DIAGNOSIS — Z01.00 DIABETIC EYE EXAM (HCC): ICD-10-CM

## (undated) DIAGNOSIS — H40.003 GLAUCOMA SUSPECT OF BOTH EYES: ICD-10-CM

## (undated) DIAGNOSIS — I10 ESSENTIAL HYPERTENSION: ICD-10-CM

## (undated) DIAGNOSIS — R92.8 ABNORMAL MAMMOGRAM: ICD-10-CM

## (undated) DIAGNOSIS — E11.3293 CONTROLLED TYPE 2 DIABETES MELLITUS WITH BOTH EYES AFFECTED BY MILD NONPROLIFERATIVE RETINOPATHY WITHOUT MACULAR EDEMA, WITHOUT LONG-TERM CURRENT USE OF INSULIN (HCC): Primary | ICD-10-CM

## (undated) DIAGNOSIS — Z12.31 ENCOUNTER FOR SCREENING MAMMOGRAM FOR BREAST CANCER: Primary | ICD-10-CM

## (undated) DEVICE — PROXIMATE RH ROTATING HEAD SKIN STAPLERS (35 WIDE) CONTAINS 35 STAINLESS STEEL STAPLES: Brand: PROXIMATE

## (undated) DEVICE — GOWN SURG AERO BLUE PERF LG

## (undated) DEVICE — SUTURE SILK 4-0 P-3

## (undated) DEVICE — GAMMEX® PI HYBRID SIZE 7, STERILE POWDER-FREE SURGICAL GLOVE, POLYISOPRENE AND NEOPRENE BLEND: Brand: GAMMEX

## (undated) DEVICE — SOL  .9 1000ML BTL

## (undated) DEVICE — LOWER EXTREMITY: Brand: MEDLINE INDUSTRIES, INC.

## (undated) DEVICE — OCCLUSIVE GAUZE STRIP,3% BISMUTH TRIBROMOPHENATE IN PETROLATUM BLEND: Brand: XEROFORM

## (undated) DEVICE — SUTURE ETHILON 4-0 699G

## (undated) DEVICE — DRAPE SHEET LAPAROTOMY

## (undated) DEVICE — 3M™ STERI-STRIP™ REINFORCED ADHESIVE SKIN CLOSURES, R1548, 1 IN X 5 IN (25 MM X 125 MM), 4 STRIPS/ENVELOPE: Brand: 3M™ STERI-STRIP™

## (undated) NOTE — LETTER
[address]  [facility] [id number]  Authorization for Imaging Procedure  Date of Procedure:     I hereby authorize Dr. Demian Benjamin , my physician and his/her assistants (if applicable), which may include medical students, residents, and/or fellows, to perform the following procedure and administer such anesthesia as may be determined necessary by my physician: [place] OF THE LEFT BREAST WITH CLIP PLACEMENT  on [address]. 2.  I recognize that during the procedure, unforeseen conditions may necessitate additional or different procedures than those listed above. I, therefore, further authorize and request that the above-named physician, assistants, or designees perform such procedures as are, in their judgment, necessary and desirable. 3.  My physician has discussed prior to my procedure the potential benefits, risks and side effects of this procedure; the likelihood of achieving goals; and potential problems that might occur during recuperation. They also discussed reasonable alternatives to the procedure, including risks, benefits, and side effects related to the alternatives and risks related to not receiving this procedure. I have had all my questions answered and I acknowledge that no guarantee has been made as to the result that may be obtained. 4.  Should the need arise during my procedure, which includes change of level of care prior to discharge, I also consent to the administration of blood and/or blood products. Further, I understand that despite careful testing and screening of blood or blood products by collecting agencies, I may still be subject to ill effects as a result of receiving a blood transfusion and/or blood products.  The following are some, but not all, of the potential risks that can occur: fever and allergic reactions, hemolytic reactions, transmission of diseases such as Hepatitis, AIDS and Cytomegalovirus (CMV) and fluid overload. In the event that I wish to have an autologous transfusion of my own blood, or a directed donor transfusion, I will discuss this with my physician. Check only if Refusing Blood or Blood Products  I understand refusal of blood or blood products as deemed necessary by my physician may have serious consequences to my condition to include possible death. I hereby assume responsibility for my refusal and release the hospital, its personnel, and my physicians from any responsibility for the consequences of my refusal.   [  ] Patient Refuses Blood      5. I authorize the use of any specimen, organs, tissues, body parts or foreign objects that may be removed from my body during the procedure for diagnosis, research or teaching purposes and their subsequent disposal by hospital authorities. I also authorize the release of specimen test results and/or written reports to my treating physician on the hospital medical staff or other referring or consulting physicians involved in my care, at the discretion of the Pathologist or my treating physician. 6.  I consent to the photographing or videotaping of the procedures to be performed, including appropriate portions of my body for medical, scientific, or educational purposes, provided my identity is not revealed by the pictures or by descriptive texts accompanying them. If the procedure has been photographed/videotaped, the physician will obtain the original picture, image, videotape or CD. The hospital will not be responsible for storage, release or maintenance of the picture, image, tape or CD.   7.  I consent to the presence of a  or observers in the operating room as deemed necessary by my physician or their designees. 8.  I recognize that in the event my procedure results in extended X-Ray/fluoroscopy time, I may develop a skin reaction. 9.   If I have a Do Not Attempt Resuscitation (DNAR) order in place, that status will be suspended while in the operating room, procedural suite, and during the recovery period unless otherwise explicitly stated by me (or a person authorized to consent on my behalf). The performing physician or my attending physician will determine when the applicable recovery period ends for purposes of reinstating the DNAR order. 10.  I acknowledge that my physician has explained sedation/analgesia administration to me including the risk and benefits I consent to the administration of sedation/analgesia as may be necessary or desirable in the judgment of my physician. I CERTIFY THAT I HAVE READ AND FULLY UNDERSTAND THE ABOVE CONSENT FOR THE PROCEDURE. Signature of Patient: _____________________________________________________________  Responsible person in case of minor, unconscious: ____________________________________  Relationship to patient:  __________________________________________________________  Signature of Witness: _______________________________Date: _________Time: __________    Statement of Physician: My signature below affirms that prior to the time of the procedure, I have explained to the patient and/or her guardian, the risks and benefits involved in the proposed treatment and any reasonable alternative to the proposed treatment. I have also explained the risks and benefits involved in the refusal of the proposed treatment and have answered the patient's questions. If I have a significant financial interest in a co-management agreement or a significant financial interest in any product or implant, or other significant relationship used in the procedure/surgery, I have disclosed this and had a discussion with my patient.   Signature of Physician:   _________________________________Date:_____________Time:________    Patient Name: Sofia Gio : 1940  Printed: 2023   Medical Record #: [id number]

## (undated) NOTE — LETTER
Dr. Lucie Pro MD     [address] [place] [address] [address]  [phone]     October 28, 2020      [address].      Dear Ms. Barrett

## (undated) NOTE — LETTER
AUTHORIZATION FOR SURGICAL OPERATION OR OTHER PROCEDURE    1. I hereby authorize Dr. Gillespie, and [facility] staff assigned to my case to perform the following operation and/or procedure at the [facility] site:    Right knee cortisone injection   _______________________________________________________________________________________________      _______________________________________________________________________________________________    2.  My physician has explained the nature and purpose of the operation or other procedure, possible alternative methods of treatment, the risks involved, and the possibility of complication to me.  I acknowledge that no guarantee has been made as to the result that may be obtained.  3.  I recognize that, during the course of this operation, or other procedure, unforseen conditions may necessitate additional or different procedure than those listed above.  I, therefore, further authorize and request that the above named physician, his/her physician assistants or designees perform such procedures as are, in his/her professional opinion, necessary and desirable.  4.  Any tissue or organs removed in the operation or other procedure may be disposed of by and at the discretion of the [facility] and [facility].  5.  I understand that in the event of a medical emergency, I will be transported by local paramedics to [facility] or other hospital emergency department.  6.  I certify that I have read and fully understand the above consent to operation and/or other procedure.    7.  I acknowledge that my physician has explained sedation/analgesia administration to me including the risks and benefits.  I consent to the administration of sedation/analgesia as may be necessary or desirable in the judgement of my physician.    Witness signature: ___________________________________________________ Date:  ______/______/_____                     Time:  ________ A.M.  P.M.       Patient Name:  ______________________________________________________  (please print)      Patient signature:  ___________________________________________________             Relationship to Patient:           []  Parent    Responsible person                          []  Spouse  In case of minor or                    [] Other  _____________   Incompetent name:  __________________________________________________                               (please print)      _____________      Responsible person  In case of minor or  Incompetent signature:  _______________________________________________    Statement of Physician  My signature below affirms that prior to the time of the procedure, I have explained to the patient and/or his/her guardian, the risks and benefits involved in the proposed treatment and any reasonable alternative to the proposed treatment.  I have also explained the risks and benefits involved in the refusal of the proposed treatment and have answered the patient's questions.                        Date:  ______/______/_______  Provider                      Signature:  __________________________________________________________       Time:  ___________ A.M    P.M.

## (undated) NOTE — LETTER
19      Patient: Ingrid Maldonado  : 1940 Visit date: 2019    Dear Sammi Fischer,      I examined your patient in consultation today. She has a basal cell of the right leg.   We will plan on wide excision under frozen section c

## (undated) NOTE — LETTER
May 20, 2024    Gilma COLLINS Ghotra  [address]  [facility] [address]      Querida Gilma:  Fue un placer hablar con usted por teléfono recientemente. Para el seguimiento, quería enviarle mi información de contacto para utilizar cuando usted tiene césar pregunta y / o necesita alguna ayuda. Estamos muy contentos de que haya decidido darle un intento a nuestro programa de Gestión de Cuidado Crónico. Estoy disponible para proporcionarle el apoyo y la educación necesarios para mantenerlo mary.  Juntos trabajaremos en:  Coordinación de la atención: ayuda a reducir pedidos / pruebas duplicados para ahorrar tiempo y dinero  Medicamentos: revisar, educar y discutir cualquier preocupación o problema que pueda tener  Educación personalizada y apoyo con respecto a zarate mark.  Estos servicios, entre otros, le ayudarán a emily el control de zarate mark ya proporcionarle césar atención de calidad óptima. He adjuntado césar revisión más en profundidad del programa para ayudar a esbozar la información que habíamos hablado por teléfono. Si tiene alguna pregunta no dude en ponerse en contacto conmigo mismo y zarate compañía de seguros para obtener más detalles.  Espero con interés trabajar con usted,    Erin Hicks, SH[facility]  Erin Hicks  Health  [facility] [address] [place] [id number] [phone] [email or web address]

## (undated) NOTE — LETTER
[facility]  [address] [facility] [address]    Authorization for Imaging Procedure      I hereby authorize Dr. LESLIE, my physician and his/her assistants (if applicable), which may include medical students, residents, and/or fellows, to perform the following procedure and administer such anesthesia as may be determined necessary by my physician: STEREOTACTIC GUIDED BIOPSY WITH TOMOSYTHESIS OF THE LEFT BREAST WITH CLIP PLACEMENT on Gilma Ghotra.   2.  I recognize that during the procedure, unforeseen conditions may necessitate additional or different procedures than those listed above. I, therefore, further authorize and request that the above-named physician, assistants, or designees perform such procedures as are, in their judgment, necessary and desirable.    3.  My physician has discussed prior to my procedure the potential benefits, risks and side effects of this procedure; the likelihood of achieving goals; and potential problems that might occur during recuperation. They also discussed reasonable alternatives to the procedure, including risks, benefits, and side effects related to the alternatives and risks related to not receiving this procedure. I have had all my questions answered and I acknowledge that no guarantee has been made as to the result that may be obtained.    4.  Should the need arise during my procedure, which includes change of level of care prior to discharge, I also consent to the administration of blood and/or blood products. Further, I understand that despite careful testing and screening of blood or blood products by collecting agencies, I may still be subject to ill effects as a result of receiving a blood transfusion and/or blood products. The following are some, but not all, of the potential risks that can occur: fever and allergic reactions, hemolytic reactions, transmission of diseases such as Hepatitis, AIDS and  Cytomegalovirus (CMV) and fluid overload. In the event that I wish to have an autologous transfusion of my own blood, or a directed donor transfusion, I will discuss this with my physician.  Check only if Refusing Blood or Blood Products  I understand refusal of blood or blood products as deemed necessary by my physician may have serious consequences to my condition to include possible death. I hereby assume responsibility for my refusal and release the hospital, its personnel, and my physicians from any responsibility for the consequences of my refusal.   [  ] Patient Refuses Blood      5.  I authorize the use of any specimen, organs, tissues, body parts or foreign objects that may be removed from my body during the procedure for diagnosis, research or teaching purposes and their subsequent disposal by hospital authorities. I also authorize the release of specimen test results and/or written reports to my treating physician on the hospital medical staff or other referring or consulting physicians involved in my care, at the discretion of the Pathologist or my treating physician.    6.  I consent to the photographing or videotaping of the procedures to be performed, including appropriate portions of my body for medical, scientific, or educational purposes, provided my identity is not revealed by the pictures or by descriptive texts accompanying them. If the procedure has been photographed/videotaped, the physician will obtain the original picture, image, videotape or CD. The hospital will not be responsible for storage, release or maintenance of the picture, image, tape or CD.   7.  I consent to the presence of a  or observers in the operating room as deemed necessary by my physician or their designees.    8.  I recognize that in the event my procedure results in extended X-Ray/fluoroscopy time, I may develop a skin reaction.    9.  If I have a Do Not Attempt Resuscitation (DNAR) order in place,  that status will be suspended while in the operating room, procedural suite, and during the recovery period unless otherwise explicitly stated by me (or a person authorized to consent on my behalf). The performing physician or my attending physician will determine when the applicable recovery period ends for purposes of reinstating the DNAR order.  10.  I acknowledge that my physician has explained sedation/analgesia administration to me including the risk and benefits I consent to the administration of sedation/analgesia as may be necessary or desirable in the judgment of my physician.      I CERTIFY THAT I HAVE READ AND FULLY UNDERSTAND THE ABOVE CONSENT FOR THE PROCEDURE.   Signature of Patient: _____________________________________________________________  Responsible person in case of minor, unconscious: ____________________________________  Relationship to patient:  __________________________________________________________  Signature of Witness: _______________________________Date: _________Time: __________    Statement of Physician: My signature below affirms that prior to the time of the procedure, I have explained to the patient and/or her guardian, the risks and benefits involved in the proposed treatment and any reasonable alternative to the proposed treatment. I have also explained the risks and benefits involved in the refusal of the proposed treatment and have answered the patient's questions. If I have a significant financial interest in a co-management agreement or a significant financial interest in any product or implant, or other significant relationship used in the procedure/surgery, I have disclosed this and had a discussion with my patient.  Signature of Physician:   _________________________________Date:_____________Time:________    Patient Name: Gilma Ghotra : 1940  Printed: 2024   Medical Record #: [id number]

## (undated) NOTE — LETTER
02/24/21        [address] [place] [address] [place],    Nuestros registros indican que tiene análisis clínicos pendientes y/o pruebas que se ordenaron en zarate nombre que no se ramon completado.     Para brindarle